# Patient Record
Sex: FEMALE | Race: WHITE | NOT HISPANIC OR LATINO | Employment: FULL TIME | URBAN - METROPOLITAN AREA
[De-identification: names, ages, dates, MRNs, and addresses within clinical notes are randomized per-mention and may not be internally consistent; named-entity substitution may affect disease eponyms.]

---

## 2018-08-14 ENCOUNTER — OFFICE VISIT CONVERTED (OUTPATIENT)
Dept: GASTROENTEROLOGY | Facility: CLINIC | Age: 23
End: 2018-08-14
Attending: NURSE PRACTITIONER

## 2019-01-18 ENCOUNTER — HOSPITAL ENCOUNTER (OUTPATIENT)
Dept: GENERAL RADIOLOGY | Facility: HOSPITAL | Age: 24
Discharge: HOME | End: 2019-01-18
Attending: NURSE PRACTITIONER

## 2019-03-31 ENCOUNTER — HOSPITAL ENCOUNTER (OUTPATIENT)
Dept: URGENT CARE | Facility: CLINIC | Age: 24
Discharge: HOME | End: 2019-03-31
Attending: NURSE PRACTITIONER

## 2019-04-01 ENCOUNTER — OFFICE VISIT CONVERTED (OUTPATIENT)
Dept: GASTROENTEROLOGY | Facility: CLINIC | Age: 24
End: 2019-04-01
Attending: NURSE PRACTITIONER

## 2019-04-21 ENCOUNTER — HOSPITAL ENCOUNTER (EMERGENCY)
Facility: HOSPITAL | Age: 24
Discharge: HOME | End: 2019-04-21
Attending: EMERGENCY MEDICINE | Admitting: FAMILY MEDICINE

## 2019-04-21 ENCOUNTER — APPOINTMENT (OUTPATIENT)
Dept: GENERAL RADIOLOGY | Facility: HOSPITAL | Age: 24
End: 2019-04-21

## 2019-04-21 VITALS
DIASTOLIC BLOOD PRESSURE: 81 MMHG | HEART RATE: 84 BPM | OXYGEN SATURATION: 98 % | RESPIRATION RATE: 20 BRPM | WEIGHT: 199 LBS | TEMPERATURE: 97.6 F | SYSTOLIC BLOOD PRESSURE: 159 MMHG | HEIGHT: 64 IN | BODY MASS INDEX: 33.97 KG/M2

## 2019-04-21 DIAGNOSIS — R07.9 CHEST PAIN, UNSPECIFIED TYPE: ICD-10-CM

## 2019-04-21 DIAGNOSIS — R31.9 URINARY TRACT INFECTION WITH HEMATURIA, SITE UNSPECIFIED: Primary | ICD-10-CM

## 2019-04-21 DIAGNOSIS — N39.0 URINARY TRACT INFECTION WITH HEMATURIA, SITE UNSPECIFIED: Primary | ICD-10-CM

## 2019-04-21 LAB
ALBUMIN SERPL-MCNC: 4.3 G/DL (ref 3.5–5.2)
ALBUMIN/GLOB SERPL: 1.5 G/DL
ALP SERPL-CCNC: 83 U/L (ref 39–117)
ALT SERPL W P-5'-P-CCNC: 22 U/L (ref 1–33)
AMPHET+METHAMPHET UR QL: NEGATIVE
ANION GAP SERPL CALCULATED.3IONS-SCNC: 14 MMOL/L
AST SERPL-CCNC: 16 U/L (ref 1–32)
B-HCG UR QL: NEGATIVE
BACTERIA UR QL AUTO: ABNORMAL /HPF
BARBITURATES UR QL SCN: NEGATIVE
BASOPHILS # BLD AUTO: 0.14 10*3/MM3 (ref 0–0.2)
BASOPHILS NFR BLD AUTO: 1.5 % (ref 0–1.5)
BENZODIAZ UR QL SCN: NEGATIVE
BILIRUB SERPL-MCNC: 0.3 MG/DL (ref 0.2–1.2)
BILIRUB UR QL STRIP: NEGATIVE
BUN BLD-MCNC: 7 MG/DL (ref 6–20)
BUN/CREAT SERPL: 9.3 (ref 7–25)
CALCIUM SPEC-SCNC: 9.7 MG/DL (ref 8.6–10.5)
CANNABINOIDS SERPL QL: NEGATIVE
CHLORIDE SERPL-SCNC: 105 MMOL/L (ref 98–107)
CLARITY UR: ABNORMAL
CO2 SERPL-SCNC: 26 MMOL/L (ref 22–29)
COCAINE UR QL: NEGATIVE
COLOR UR: YELLOW
CREAT BLD-MCNC: 0.75 MG/DL (ref 0.57–1)
DEPRECATED RDW RBC AUTO: 40.5 FL (ref 37–54)
EOSINOPHIL # BLD AUTO: 0.23 10*3/MM3 (ref 0–0.4)
EOSINOPHIL NFR BLD AUTO: 2.5 % (ref 0.3–6.2)
ERYTHROCYTE [DISTWIDTH] IN BLOOD BY AUTOMATED COUNT: 12.4 % (ref 12.3–15.4)
GFR SERPL CREATININE-BSD FRML MDRD: 97 ML/MIN/1.73
GLOBULIN UR ELPH-MCNC: 2.9 GM/DL
GLUCOSE BLD-MCNC: 104 MG/DL (ref 65–99)
GLUCOSE UR STRIP-MCNC: NEGATIVE MG/DL
HCT VFR BLD AUTO: 45.2 % (ref 34–46.6)
HGB BLD-MCNC: 15.2 G/DL (ref 12–15.9)
HGB UR QL STRIP.AUTO: ABNORMAL
HOLD SPECIMEN: NORMAL
HYALINE CASTS UR QL AUTO: ABNORMAL /LPF
IMM GRANULOCYTES # BLD AUTO: 0.02 10*3/MM3 (ref 0–0.05)
IMM GRANULOCYTES NFR BLD AUTO: 0.2 % (ref 0–0.5)
KETONES UR QL STRIP: NEGATIVE
LEUKOCYTE ESTERASE UR QL STRIP.AUTO: ABNORMAL
LYMPHOCYTES # BLD AUTO: 2.45 10*3/MM3 (ref 0.7–3.1)
LYMPHOCYTES NFR BLD AUTO: 26.1 % (ref 19.6–45.3)
MCH RBC QN AUTO: 29.7 PG (ref 26.6–33)
MCHC RBC AUTO-ENTMCNC: 33.6 G/DL (ref 31.5–35.7)
MCV RBC AUTO: 88.3 FL (ref 79–97)
METHADONE UR QL SCN: NEGATIVE
MONOCYTES # BLD AUTO: 0.5 10*3/MM3 (ref 0.1–0.9)
MONOCYTES NFR BLD AUTO: 5.3 % (ref 5–12)
NEUTROPHILS # BLD AUTO: 6.03 10*3/MM3 (ref 1.7–7)
NEUTROPHILS NFR BLD AUTO: 64.4 % (ref 42.7–76)
NITRITE UR QL STRIP: NEGATIVE
NRBC BLD AUTO-RTO: 0 /100 WBC (ref 0–0.2)
OPIATES UR QL: NEGATIVE
OXYCODONE UR QL SCN: NEGATIVE
PH UR STRIP.AUTO: 8 [PH] (ref 5–9)
PLATELET # BLD AUTO: 226 10*3/MM3 (ref 140–450)
PMV BLD AUTO: 10.4 FL (ref 6–12)
POTASSIUM BLD-SCNC: 3.6 MMOL/L (ref 3.5–5.2)
PROT SERPL-MCNC: 7.2 G/DL (ref 6–8.5)
PROT UR QL STRIP: NEGATIVE
RBC # BLD AUTO: 5.12 10*6/MM3 (ref 3.77–5.28)
RBC # UR: ABNORMAL /HPF
REF LAB TEST METHOD: ABNORMAL
SODIUM BLD-SCNC: 145 MMOL/L (ref 136–145)
SP GR UR STRIP: 1.01 (ref 1–1.03)
SQUAMOUS #/AREA URNS HPF: ABNORMAL /HPF
UROBILINOGEN UR QL STRIP: ABNORMAL
WBC NRBC COR # BLD: 9.37 10*3/MM3 (ref 3.4–10.8)
WBC UR QL AUTO: ABNORMAL /HPF
WHOLE BLOOD HOLD SPECIMEN: NORMAL
YEAST URNS QL MICRO: ABNORMAL /HPF

## 2019-04-21 PROCEDURE — 80307 DRUG TEST PRSMV CHEM ANLYZR: CPT | Performed by: PHYSICIAN ASSISTANT

## 2019-04-21 PROCEDURE — 87661 TRICHOMONAS VAGINALIS AMPLIF: CPT | Performed by: PHYSICIAN ASSISTANT

## 2019-04-21 PROCEDURE — 81025 URINE PREGNANCY TEST: CPT | Performed by: EMERGENCY MEDICINE

## 2019-04-21 PROCEDURE — 93010 ELECTROCARDIOGRAM REPORT: CPT | Performed by: INTERNAL MEDICINE

## 2019-04-21 PROCEDURE — 81001 URINALYSIS AUTO W/SCOPE: CPT | Performed by: EMERGENCY MEDICINE

## 2019-04-21 PROCEDURE — 87086 URINE CULTURE/COLONY COUNT: CPT | Performed by: PHYSICIAN ASSISTANT

## 2019-04-21 PROCEDURE — 99284 EMERGENCY DEPT VISIT MOD MDM: CPT

## 2019-04-21 PROCEDURE — 87591 N.GONORRHOEAE DNA AMP PROB: CPT | Performed by: PHYSICIAN ASSISTANT

## 2019-04-21 PROCEDURE — 80053 COMPREHEN METABOLIC PANEL: CPT | Performed by: PHYSICIAN ASSISTANT

## 2019-04-21 PROCEDURE — 93005 ELECTROCARDIOGRAM TRACING: CPT | Performed by: EMERGENCY MEDICINE

## 2019-04-21 PROCEDURE — 85025 COMPLETE CBC W/AUTO DIFF WBC: CPT | Performed by: PHYSICIAN ASSISTANT

## 2019-04-21 PROCEDURE — 87491 CHLMYD TRACH DNA AMP PROBE: CPT | Performed by: PHYSICIAN ASSISTANT

## 2019-04-21 RX ORDER — ESCITALOPRAM OXALATE 10 MG/1
10 TABLET ORAL DAILY
COMMUNITY
End: 2021-07-25

## 2019-04-21 RX ORDER — LEVOFLOXACIN 250 MG/1
250 TABLET ORAL DAILY
Qty: 5 TABLET | Refills: 0 | Status: SHIPPED | OUTPATIENT
Start: 2019-04-21 | End: 2019-04-26

## 2019-04-22 LAB
BACTERIA SPEC AEROBE CULT: NORMAL
C TRACH RRNA CVX QL NAA+PROBE: NEGATIVE
N GONORRHOEA RRNA SPEC QL NAA+PROBE: NEGATIVE
TRICHOMONAS VAGINALIS PCR: NEGATIVE

## 2019-05-09 ENCOUNTER — CONVERSION ENCOUNTER (OUTPATIENT)
Dept: CARDIOLOGY | Facility: CLINIC | Age: 24
End: 2019-05-09

## 2019-05-09 ENCOUNTER — OFFICE VISIT CONVERTED (OUTPATIENT)
Dept: CARDIOLOGY | Facility: CLINIC | Age: 24
End: 2019-05-09
Attending: INTERNAL MEDICINE

## 2019-05-13 ENCOUNTER — CONVERSION ENCOUNTER (OUTPATIENT)
Dept: SURGERY | Facility: CLINIC | Age: 24
End: 2019-05-13

## 2019-05-13 ENCOUNTER — OFFICE VISIT CONVERTED (OUTPATIENT)
Dept: SURGERY | Facility: CLINIC | Age: 24
End: 2019-05-13
Attending: PHYSICIAN ASSISTANT

## 2019-05-31 ENCOUNTER — HOSPITAL ENCOUNTER (OUTPATIENT)
Dept: GASTROENTEROLOGY | Facility: HOSPITAL | Age: 24
Setting detail: HOSPITAL OUTPATIENT SURGERY
Discharge: HOME | End: 2019-05-31
Attending: INTERNAL MEDICINE

## 2019-05-31 LAB — HCG UR QL: NEGATIVE

## 2019-06-12 ENCOUNTER — HOSPITAL ENCOUNTER (OUTPATIENT)
Dept: GENERAL RADIOLOGY | Facility: HOSPITAL | Age: 24
Discharge: HOME | End: 2019-06-12
Attending: PHYSICIAN ASSISTANT

## 2019-06-13 ENCOUNTER — CONVERSION ENCOUNTER (OUTPATIENT)
Dept: SURGERY | Facility: CLINIC | Age: 24
End: 2019-06-13

## 2019-06-13 ENCOUNTER — OFFICE VISIT CONVERTED (OUTPATIENT)
Dept: SURGERY | Facility: CLINIC | Age: 24
End: 2019-06-13
Attending: PHYSICIAN ASSISTANT

## 2019-06-30 ENCOUNTER — HOSPITAL ENCOUNTER (OUTPATIENT)
Dept: URGENT CARE | Facility: CLINIC | Age: 24
Discharge: HOME | End: 2019-06-30

## 2019-07-02 LAB — BACTERIA UR CULT: NORMAL

## 2019-07-25 ENCOUNTER — HOSPITAL ENCOUNTER (OUTPATIENT)
Dept: URGENT CARE | Facility: CLINIC | Age: 24
Discharge: HOME | End: 2019-07-25

## 2019-08-02 ENCOUNTER — CONVERSION ENCOUNTER (OUTPATIENT)
Dept: SURGERY | Facility: CLINIC | Age: 24
End: 2019-08-02

## 2019-08-02 ENCOUNTER — OFFICE VISIT CONVERTED (OUTPATIENT)
Dept: SURGERY | Facility: CLINIC | Age: 24
End: 2019-08-02
Attending: PHYSICIAN ASSISTANT

## 2019-08-13 ENCOUNTER — OFFICE VISIT CONVERTED (OUTPATIENT)
Dept: SURGERY | Facility: CLINIC | Age: 24
End: 2019-08-13
Attending: PHYSICIAN ASSISTANT

## 2019-09-16 ENCOUNTER — HOSPITAL ENCOUNTER (OUTPATIENT)
Dept: SURGERY | Facility: CLINIC | Age: 24
Discharge: HOME | End: 2019-09-16
Attending: PHYSICIAN ASSISTANT

## 2019-09-16 ENCOUNTER — OFFICE VISIT CONVERTED (OUTPATIENT)
Dept: SURGERY | Facility: CLINIC | Age: 24
End: 2019-09-16
Attending: PHYSICIAN ASSISTANT

## 2019-09-18 ENCOUNTER — HOSPITAL ENCOUNTER (OUTPATIENT)
Dept: URGENT CARE | Facility: CLINIC | Age: 24
Discharge: HOME | End: 2019-09-18

## 2019-09-18 LAB — BACTERIA UR CULT: NORMAL

## 2019-09-20 ENCOUNTER — HOSPITAL ENCOUNTER (OUTPATIENT)
Dept: URGENT CARE | Facility: CLINIC | Age: 24
Discharge: HOME | End: 2019-09-20

## 2019-09-20 LAB — BACTERIA SPEC AEROBE CULT: NORMAL

## 2019-09-24 ENCOUNTER — HOSPITAL ENCOUNTER (OUTPATIENT)
Dept: URGENT CARE | Facility: CLINIC | Age: 24
Discharge: HOME | End: 2019-09-24

## 2019-09-30 ENCOUNTER — HOSPITAL ENCOUNTER (OUTPATIENT)
Dept: LAB | Facility: HOSPITAL | Age: 24
Discharge: HOME | End: 2019-09-30
Attending: PHYSICIAN ASSISTANT

## 2019-09-30 ENCOUNTER — OFFICE VISIT CONVERTED (OUTPATIENT)
Dept: GASTROENTEROLOGY | Facility: CLINIC | Age: 24
End: 2019-09-30
Attending: NURSE PRACTITIONER

## 2019-09-30 LAB
ALBUMIN SERPL-MCNC: 4.3 G/DL (ref 3.5–5)
ALBUMIN/GLOB SERPL: 1.7 {RATIO} (ref 1.4–2.6)
ALP SERPL-CCNC: 72 U/L (ref 42–98)
ALT SERPL-CCNC: 15 U/L (ref 10–40)
ANION GAP SERPL CALC-SCNC: 20 MMOL/L (ref 8–19)
AST SERPL-CCNC: 14 U/L (ref 15–50)
BASOPHILS # BLD AUTO: 0.07 10*3/UL (ref 0–0.2)
BASOPHILS NFR BLD AUTO: 1 % (ref 0–3)
BILIRUB SERPL-MCNC: 0.25 MG/DL (ref 0.2–1.3)
BUN SERPL-MCNC: 8 MG/DL (ref 5–25)
BUN/CREAT SERPL: 10 {RATIO} (ref 6–20)
CALCIUM SERPL-MCNC: 9.5 MG/DL (ref 8.7–10.4)
CHLORIDE SERPL-SCNC: 105 MMOL/L (ref 99–111)
CONV ABS IMM GRAN: 0.02 10*3/UL (ref 0–0.2)
CONV CO2: 22 MMOL/L (ref 22–32)
CONV IMMATURE GRAN: 0.3 % (ref 0–1.8)
CONV TOTAL PROTEIN: 6.8 G/DL (ref 6.3–8.2)
CREAT UR-MCNC: 0.81 MG/DL (ref 0.5–0.9)
DEPRECATED RDW RBC AUTO: 38.2 FL (ref 36.4–46.3)
EOSINOPHIL # BLD AUTO: 0.08 10*3/UL (ref 0–0.7)
EOSINOPHIL # BLD AUTO: 1.2 % (ref 0–7)
ERYTHROCYTE [DISTWIDTH] IN BLOOD BY AUTOMATED COUNT: 11.8 % (ref 11.7–14.4)
GFR SERPLBLD BASED ON 1.73 SQ M-ARVRAT: >60 ML/MIN/{1.73_M2}
GLOBULIN UR ELPH-MCNC: 2.5 G/DL (ref 2–3.5)
GLUCOSE SERPL-MCNC: 96 MG/DL (ref 65–99)
HCT VFR BLD AUTO: 44 % (ref 37–47)
HGB BLD-MCNC: 14.6 G/DL (ref 12–16)
LYMPHOCYTES # BLD AUTO: 2.17 10*3/UL (ref 1–5)
LYMPHOCYTES NFR BLD AUTO: 31.8 % (ref 20–45)
MCH RBC QN AUTO: 29.8 PG (ref 27–31)
MCHC RBC AUTO-ENTMCNC: 33.2 G/DL (ref 33–37)
MCV RBC AUTO: 89.8 FL (ref 81–99)
MONOCYTES # BLD AUTO: 0.42 10*3/UL (ref 0.2–1.2)
MONOCYTES NFR BLD AUTO: 6.2 % (ref 3–10)
NEUTROPHILS # BLD AUTO: 4.06 10*3/UL (ref 2–8)
NEUTROPHILS NFR BLD AUTO: 59.5 % (ref 30–85)
NRBC CBCN: 0 % (ref 0–0.7)
OSMOLALITY SERPL CALC.SUM OF ELEC: 294 MOSM/KG (ref 273–304)
PLATELET # BLD AUTO: 240 10*3/UL (ref 130–400)
PMV BLD AUTO: 11.1 FL (ref 9.4–12.3)
POTASSIUM SERPL-SCNC: 4 MMOL/L (ref 3.5–5.3)
RBC # BLD AUTO: 4.9 10*6/UL (ref 4.2–5.4)
SODIUM SERPL-SCNC: 143 MMOL/L (ref 135–147)
WBC # BLD AUTO: 6.82 10*3/UL (ref 4.8–10.8)

## 2019-10-10 ENCOUNTER — OFFICE VISIT CONVERTED (OUTPATIENT)
Dept: SURGERY | Facility: CLINIC | Age: 24
End: 2019-10-10
Attending: PHYSICIAN ASSISTANT

## 2019-10-17 ENCOUNTER — OFFICE VISIT CONVERTED (OUTPATIENT)
Dept: SURGERY | Facility: CLINIC | Age: 24
End: 2019-10-17
Attending: PHYSICIAN ASSISTANT

## 2019-11-06 ENCOUNTER — HOSPITAL ENCOUNTER (OUTPATIENT)
Dept: URGENT CARE | Facility: CLINIC | Age: 24
Discharge: HOME | End: 2019-11-06

## 2019-11-10 ENCOUNTER — HOSPITAL ENCOUNTER (OUTPATIENT)
Dept: URGENT CARE | Facility: CLINIC | Age: 24
Discharge: HOME | End: 2019-11-10

## 2019-11-11 ENCOUNTER — HOSPITAL ENCOUNTER (OUTPATIENT)
Dept: SURGERY | Facility: CLINIC | Age: 24
Discharge: HOME | End: 2019-11-11
Attending: PHYSICIAN ASSISTANT

## 2019-11-11 ENCOUNTER — OFFICE VISIT CONVERTED (OUTPATIENT)
Dept: SURGERY | Facility: CLINIC | Age: 24
End: 2019-11-11
Attending: PHYSICIAN ASSISTANT

## 2019-11-12 LAB — BACTERIA SPEC AEROBE CULT: NORMAL

## 2019-11-13 LAB — BACTERIA UR CULT: NORMAL

## 2019-11-16 ENCOUNTER — HOSPITAL ENCOUNTER (OUTPATIENT)
Dept: URGENT CARE | Facility: CLINIC | Age: 24
Discharge: HOME | End: 2019-11-16
Attending: NURSE PRACTITIONER

## 2019-11-18 ENCOUNTER — CONVERSION ENCOUNTER (OUTPATIENT)
Dept: SURGERY | Facility: CLINIC | Age: 24
End: 2019-11-18

## 2019-11-18 ENCOUNTER — OFFICE VISIT CONVERTED (OUTPATIENT)
Dept: SURGERY | Facility: CLINIC | Age: 24
End: 2019-11-18
Attending: PHYSICIAN ASSISTANT

## 2019-11-24 ENCOUNTER — HOSPITAL ENCOUNTER (OUTPATIENT)
Dept: URGENT CARE | Facility: CLINIC | Age: 24
Discharge: HOME | End: 2019-11-24
Attending: FAMILY MEDICINE

## 2019-11-26 ENCOUNTER — HOSPITAL ENCOUNTER (OUTPATIENT)
Dept: GENERAL RADIOLOGY | Facility: HOSPITAL | Age: 24
Discharge: HOME | End: 2019-11-26
Attending: OBSTETRICS & GYNECOLOGY

## 2019-11-26 ENCOUNTER — OFFICE VISIT CONVERTED (OUTPATIENT)
Dept: SURGERY | Facility: CLINIC | Age: 24
End: 2019-11-26
Attending: PHYSICIAN ASSISTANT

## 2019-12-10 ENCOUNTER — OFFICE VISIT CONVERTED (OUTPATIENT)
Dept: GASTROENTEROLOGY | Facility: CLINIC | Age: 24
End: 2019-12-10
Attending: NURSE PRACTITIONER

## 2019-12-13 ENCOUNTER — HOSPITAL ENCOUNTER (OUTPATIENT)
Dept: LAB | Facility: HOSPITAL | Age: 24
Discharge: HOME | End: 2019-12-13
Attending: NURSE PRACTITIONER

## 2019-12-13 LAB
C DIFF TOX B STL QL CT TISS CULT: NEGATIVE
CONV 027 TOXIN: NEGATIVE

## 2019-12-15 LAB — BACTERIA SPEC AEROBE CULT: NORMAL

## 2019-12-23 LAB
CONV NEUTRAL FATS: NORMAL
FAT STL QL: NORMAL

## 2020-01-11 ENCOUNTER — HOSPITAL ENCOUNTER (OUTPATIENT)
Dept: URGENT CARE | Facility: CLINIC | Age: 25
Discharge: HOME | End: 2020-01-11

## 2020-01-13 LAB — BACTERIA SPEC AEROBE CULT: NORMAL

## 2020-01-14 ENCOUNTER — HOSPITAL ENCOUNTER (OUTPATIENT)
Dept: URGENT CARE | Facility: CLINIC | Age: 25
Discharge: HOME | End: 2020-01-14
Attending: EMERGENCY MEDICINE

## 2020-01-28 ENCOUNTER — HOSPITAL ENCOUNTER (OUTPATIENT)
Dept: LAB | Facility: HOSPITAL | Age: 25
Discharge: HOME | End: 2020-01-28
Attending: NURSE PRACTITIONER

## 2020-01-28 LAB
BASOPHILS # BLD AUTO: 0.06 10*3/UL (ref 0–0.2)
BASOPHILS NFR BLD AUTO: 0.8 % (ref 0–3)
CONV ABS IMM GRAN: 0.02 10*3/UL (ref 0–0.2)
CONV IMMATURE GRAN: 0.3 % (ref 0–1.8)
DEPRECATED RDW RBC AUTO: 41.2 FL (ref 36.4–46.3)
EOSINOPHIL # BLD AUTO: 0.05 10*3/UL (ref 0–0.7)
EOSINOPHIL # BLD AUTO: 0.7 % (ref 0–7)
ERYTHROCYTE [DISTWIDTH] IN BLOOD BY AUTOMATED COUNT: 12.1 % (ref 11.7–14.4)
HCT VFR BLD AUTO: 46.7 % (ref 37–47)
HGB BLD-MCNC: 14.7 G/DL (ref 12–16)
LYMPHOCYTES # BLD AUTO: 2.01 10*3/UL (ref 1–5)
LYMPHOCYTES NFR BLD AUTO: 26.7 % (ref 20–45)
MCH RBC QN AUTO: 29.1 PG (ref 27–31)
MCHC RBC AUTO-ENTMCNC: 31.5 G/DL (ref 33–37)
MCV RBC AUTO: 92.5 FL (ref 81–99)
MONOCYTES # BLD AUTO: 0.46 10*3/UL (ref 0.2–1.2)
MONOCYTES NFR BLD AUTO: 6.1 % (ref 3–10)
NEUTROPHILS # BLD AUTO: 4.93 10*3/UL (ref 2–8)
NEUTROPHILS NFR BLD AUTO: 65.4 % (ref 30–85)
NRBC CBCN: 0 % (ref 0–0.7)
PLATELET # BLD AUTO: 239 10*3/UL (ref 130–400)
PMV BLD AUTO: 11.3 FL (ref 9.4–12.3)
RBC # BLD AUTO: 5.05 10*6/UL (ref 4.2–5.4)
WBC # BLD AUTO: 7.53 10*3/UL (ref 4.8–10.8)

## 2020-02-07 ENCOUNTER — HOSPITAL ENCOUNTER (OUTPATIENT)
Dept: URGENT CARE | Facility: CLINIC | Age: 25
Discharge: HOME | End: 2020-02-07
Attending: PHYSICIAN ASSISTANT

## 2020-02-10 LAB — BACTERIA SPEC AEROBE CULT: NORMAL

## 2020-02-18 ENCOUNTER — OFFICE VISIT CONVERTED (OUTPATIENT)
Dept: SURGERY | Facility: CLINIC | Age: 25
End: 2020-02-18
Attending: PHYSICIAN ASSISTANT

## 2020-02-18 ENCOUNTER — HOSPITAL ENCOUNTER (OUTPATIENT)
Dept: SURGERY | Facility: CLINIC | Age: 25
Discharge: HOME | End: 2020-02-18
Attending: PHYSICIAN ASSISTANT

## 2020-02-20 LAB — BACTERIA UR CULT: NORMAL

## 2020-02-21 ENCOUNTER — HOSPITAL ENCOUNTER (OUTPATIENT)
Dept: URGENT CARE | Facility: CLINIC | Age: 25
Discharge: HOME | End: 2020-02-21
Attending: NURSE PRACTITIONER

## 2020-03-05 ENCOUNTER — HOSPITAL ENCOUNTER (OUTPATIENT)
Dept: URGENT CARE | Facility: CLINIC | Age: 25
Discharge: HOME | End: 2020-03-05
Attending: NURSE PRACTITIONER

## 2020-03-06 ENCOUNTER — OFFICE VISIT CONVERTED (OUTPATIENT)
Dept: OTOLARYNGOLOGY | Facility: CLINIC | Age: 25
End: 2020-03-06
Attending: OTOLARYNGOLOGY

## 2020-03-07 LAB — BACTERIA SPEC AEROBE CULT: NORMAL

## 2020-04-24 ENCOUNTER — HOSPITAL ENCOUNTER (OUTPATIENT)
Dept: GENERAL RADIOLOGY | Facility: HOSPITAL | Age: 25
Discharge: HOME | End: 2020-04-24
Attending: OBSTETRICS & GYNECOLOGY

## 2020-05-05 LAB — SARS-COV-2 RNA SPEC QL NAA+PROBE: NOT DETECTED

## 2020-05-07 ENCOUNTER — HOSPITAL ENCOUNTER (OUTPATIENT)
Dept: GASTROENTEROLOGY | Facility: HOSPITAL | Age: 25
Setting detail: HOSPITAL OUTPATIENT SURGERY
Discharge: HOME | End: 2020-05-07
Attending: INTERNAL MEDICINE

## 2020-05-07 LAB — HCG UR QL: NEGATIVE

## 2020-06-16 ENCOUNTER — OFFICE VISIT CONVERTED (OUTPATIENT)
Dept: GASTROENTEROLOGY | Facility: CLINIC | Age: 25
End: 2020-06-16
Attending: NURSE PRACTITIONER

## 2020-07-30 ENCOUNTER — HOSPITAL ENCOUNTER (OUTPATIENT)
Dept: SLEEP MEDICINE | Facility: HOSPITAL | Age: 25
Discharge: HOME | End: 2020-07-30
Attending: INTERNAL MEDICINE

## 2020-08-11 ENCOUNTER — HOSPITAL ENCOUNTER (OUTPATIENT)
Dept: SLEEP MEDICINE | Facility: HOSPITAL | Age: 25
Discharge: HOME | End: 2020-08-11
Attending: INTERNAL MEDICINE

## 2020-09-16 ENCOUNTER — TELEPHONE CONVERTED (OUTPATIENT)
Dept: GASTROENTEROLOGY | Facility: CLINIC | Age: 25
End: 2020-09-16
Attending: NURSE PRACTITIONER

## 2020-09-24 ENCOUNTER — HOSPITAL ENCOUNTER (OUTPATIENT)
Dept: LAB | Facility: HOSPITAL | Age: 25
Discharge: HOME | End: 2020-09-24
Attending: NURSE PRACTITIONER

## 2020-09-24 LAB
C DIFF TOX B STL QL CT TISS CULT: NEGATIVE
CONV 027 TOXIN: NEGATIVE

## 2020-09-26 LAB — BACTERIA SPEC AEROBE CULT: NORMAL

## 2020-09-30 LAB
CONV NEUTRAL FATS: NORMAL
FAT STL QL: NORMAL

## 2020-11-05 ENCOUNTER — HOSPITAL ENCOUNTER (OUTPATIENT)
Dept: SLEEP MEDICINE | Facility: HOSPITAL | Age: 25
Discharge: HOME | End: 2020-11-05
Attending: INTERNAL MEDICINE

## 2020-11-09 ENCOUNTER — TELEPHONE CONVERTED (OUTPATIENT)
Dept: GASTROENTEROLOGY | Facility: CLINIC | Age: 25
End: 2020-11-09
Attending: NURSE PRACTITIONER

## 2021-05-13 NOTE — PROGRESS NOTES
Progress Note      Patient Name: Kinsey Suresh   Patient ID: [id number]   Sex: Female   YOB: 1995    Primary Care Provider: Paulina Gale NP   Referring Provider: Deyanira BURTON    Visit Date: June 16, 2020    Provider: MICHEAL Shanks   Location: [facility] [place], [place]   Location Address: [address] [place] [address]  [id number]   Location Phone: [phone]          Chief Complaint  · Blood in stool  · constipated       History Of Present Illness  Kinsey Suresh is a 25 year old /White female who presents to the office today.      Patient initially presented July 2017 for fatty liver, elevated alk phos, diarrhea, lower abdominal pain.  Stool studies were negative in 2017, hepatic work-up negative in 2017, FibroSure showed F0, A0.    EGD/COLON negative 5/2019. CT abd/pelvis w contrast negative 6/2019.     Patient was last seen December 2019 reporting the week of Thanksgiving she had bloody stools with straining but then started having diarrhea a couple of weeks before the last appointment, some generalized abdominal pain.  CBC CMP unremarkable December 2019.    Stool studies negative 12/2019  Colonoscopy 5/7/2020: Adequate prep, small grade 1 internal hemorrhoids noted    Pt states she has seen a lot of blood w BM's , + straining. Pt tried fiber and stool softener, caused nausea, so she DC'd. BM's every 1-2 days, Glades #2-3.  No abd pain, except occasional lower abd, can be relieved after BM.       Past Medical History  Anxiety; Asthma; Depression; Diarrhea; Dizziness and giddiness; Fatty liver; Gastroesophageal reflux disease; Irritable bowel syndrome; Myofacial muscle pain; Recurrent UTI; Tinnitus, bilateral; TMJ (temporomandibular joint disorder)         Past Surgical History  Colonoscopy; endoscopy         Medication List  bisoprolol fumarate 5 mg oral tablet         Allergy List  Bactrim; Cipro; doxycycline hyclate; doxycycline monohydrate; Levaquin  "        Family Medical History  Family history of colon cancer; Family history of heart disease         Social History  Alcohol (Never); lives with parents; Single; Student; Tobacco (Never)         Review of Systems  · Constitutional  o Admits  o : good general health lately, no acute distress  · Gastrointestinal  o Denies  o : additional gastrointestinal symptoms except as noted in the HPI  · Psychiatric  o Admits  o : pleasant affect      Vitals  Date Time BP Position Site L\R Cuff Size HR RR TEMP (F) WT  HT  BMI kg/m2 BSA m2 O2 Sat        06/16/2020 02:43 /58 Sitting    67 - R  97.9 242lbs 2oz 5'  4\" 41.56 2.23           Physical Examination  · Constitutional  o Appearance  o : well developed, well-nourished, in no acute distress  · Head and Face  o Head  o :   § Inspection  § : atraumatic, normocephalic  · Eyes  o Sclerae  o : sclerae white, no sclerae icterus  · Neck  o Inspection/Palpation  o : supple  · Respiratory  o Respiratory Effort  o : breathing unlabored  o Inspection of Chest  o : normal appearance, no retractions  · Cardiovascular  o Peripheral Vascular System  o :   § Extremities  § : no cyanosis, clubbing or edema  · Skin and Subcutaneous Tissue  o General Inspection  o : no lesions present, no rashes present  · Neurologic  o Mental Status Examination  o :   § Orientation  § : grossly oriented to person, place and time  § Speech/Language  § : communication ability within normal limits, voice quality normal, articulation of speech normal, no evidence of aphasia  § Attention  § : attention normal, concentration abilities normal  o Sensation  o : grossly intact  o Gait and Station  o :   § Gait Screening  § : normal gait  · Psychiatric  o General  o : Alert and oriented x3  o Mood and Affect  o : Mood and affect are appropriate to circumstances          Assessment  · Constipation     564.00/K59.00  · Internal hemorrhoid     455.0/K64.8  · Rectal " bleeding     569.3/K62.5      Plan  · Medications  o Colace 100 mg oral capsule   SIG: take 1 capsule (100 mg) by oral route 2 times per day for 30 days   DISP: (60) capsules with 3 refills  Prescribed on 06/16/2020     o Proctozone-HC 2.5 % topical cream with perineal applicator   SIG: apply a thin layer to the affected area(s) by topical route 2 times per day for 14 days   DISP: (1) Cream with perineal applicator with 0 refills  Prescribed on 06/16/2020     o Medications have been Reconciled  o Transition of Care or Provider Policy  · Instructions  o Patient was educated/instructed on their diagnosis, treatment and medications prior to discharge from the clinic today.  o Patient instructed to seek medical attention urgently for new or worsening symptoms.  o Follow Up in 3 months.   o Encouraged to follow-up with Primary Care Provider for preventative care.  o Pt felt the nausea was from the Fiber and was willing to try the Colace daily-BID. She will call if she cannot tolerate.             Electronically Signed by: MICHEAL Shanks -Author on June 16, 2020 04:38:18 PM

## 2021-05-13 NOTE — PROGRESS NOTES
Progress Note      Patient Name: Kinsey Suresh   Patient ID: [id number]   Sex: Female   YOB: 1995    Primary Care Provider: Paulina Gale NP   Referring Provider: Deyanira BURTON    Visit Date: September 16, 2020    Provider: MICHEAL Shanks   Location: [facility] [address]   Location Address: [address] [place] [address]  [id number]   Location Phone: [phone]          Chief Complaint  · Diarrhea   · ABD pain       History Of Present Illness  TELEHEALTH TELEPHONE VISIT  Kinsey Suresh is a 25 year old /White female who is presenting for evaluation via telehealth telephone visit. Verbal consent obtained before beginning visit.   Provider spent 5 minutes with the patient during the telehealth visit.   The following staff were present during this visit: Lorena CHANDLER , Kris Ruelas MA   Past Medical History/ Overview of Patient Symptoms     Patient initially presented July 2017 for fatty liver, elevated alk phos (later resolved), diarrhea, lower abdominal pain.  Hepatic work-up was negative in 2017, fibro-sure showed F0, A0.    Negative EGD/colonoscopy May 2019, negative CT of the abdomen and pelvis  Patient reported an episode of bloody stools in November 2019 with diarrhea and abdominal pain, labs were unremarkable, stool studies were negative in December 2019, colonoscopy was repeated in May 2020 and was negative other than grade 1 internal hemorrhoid.    Patient was last seen June 2020 and reported a lot of blood with stool and + straining, felt fiber caused nausea so she discontinued.  Colace and Proctosol were prescribed.  Pt states she has not seen blood recently, she has been having more loose stools than constipation. She did take the Procotzone. Some abd cramping. 4-5 stools/day, states she has awoken during night a couple times for BM.       Past Medical History  Anxiety; Asthma; Depression; Diarrhea; Dizziness and giddiness; Fatty liver;  Gastroesophageal reflux disease; Irritable bowel syndrome; Myofacial muscle pain; Recurrent UTI; Tinnitus, bilateral; TMJ (temporomandibular joint disorder)         Past Surgical History  Colonoscopy; EGD; endoscopy         Medication List  bisoprolol fumarate 5 mg oral tablet; Blisovi 24 Fe 1 mg-20 mcg (24)/75 mg (4) oral tablet         Allergy List  Bactrim; Cipro; doxycycline hyclate; doxycycline monohydrate; Levaquin         Family Medical History  Family history of colon cancer; Family history of heart disease         Social History  Alcohol (Never); lives with parents; Single; Student; Tobacco (Never)             Assessment  · Diarrhea     787.91/R19.7  · Fatty liver     573.8  Hx of  · Irritable bowel syndrome     564.1/K58.9  mixed  · Internal hemorrhoids     455.0/K64.8      Plan  · Orders  o Stool culture (59242, 16303) - - 09/16/2020  o Lactoferrin (Fecal) Qualitative (82265) - - 09/16/2020  o C difficile Toxigenic Assay (PCR) [facility] (33576) - - 09/16/2020  o Stool Giardia and Cryptosporidium Enzyme Immunoassay [facility] (99378, 92635) - - 09/16/2020  o Fecal Fat, Qualitative (50206) - - 09/16/2020  o Fecal Occult Blood Diagnostic (Send to Lab) [facility] (40995) - - 09/16/2020  · Medications  o dicyclomine 10 mg oral capsule   SIG: take 1 capsule by oral route 3 times a day for 30 days take 30 min before meals PRN abd pain   DISP: (90) capsules with 1 refills  Prescribed on 09/16/2020     o Medications have been Reconciled  o Transition of Care or Provider Policy  · Instructions  o Plan Of Care:   o Patient instructed to seek medical attention urgently for new or worsening symptoms.  o Call the office with any concerns or questions.  o Although pt has fluctuation of constipation and diarrhea, stools ordered since pt reported some nocturnal stools. Will also trial her on Bentyl.  o F/U next available            Electronically Signed by: MICHEAL Shanks -Author on September 16, 2020 09:37:20 AM

## 2021-05-13 NOTE — PROGRESS NOTES
Progress Note      Patient Name: Kinsey Suresh   Patient ID: [id number]   Sex: Female   YOB: 1995    Primary Care Provider: Paulina Gale NP   Referring Provider: Deyanira BURTON    Visit Date: November 9, 2020    Provider: MICHEAL Shanks   Location: [facility] [address]   Location Address: [address] [place] [address]  [id number]   Location Phone: [phone]          Chief Complaint  · follow up       History Of Present Illness  TELEHEALTH TELEPHONE VISIT  Kinsey Suresh is a 25 year old /White female who is presenting for evaluation via telehealth telephone visit. Verbal consent obtained before beginning visit.   Provider spent 7 minutes with the patient during the telehealth visit.   The following staff were present during this visit: Kris Ruelas MA; Lorena CHANDLER   Past Medical History/ Overview of Patient Symptoms     Patient initially presented July 2017 for fatty liver, elevated alk phos (later resolved), diarrhea, lower abdominal pain.  Hepatic work-up was negative in 2017, fibro-sure showed F0, A0.    Negative EGD/colonoscopy May 2019, negative CT of the abdomen and pelvis    Patient was last seen September 2020 reporting no recent rectal bleeding (had seen in June) but was complaining of more loose stools.  She felt she improved after Proctozone, she had some complaints of abdominal cramping also so dicyclomine was prescribed and stool studies were ordered  9/24/2020: Stool occult blood negative, lactoferrin negative, C. difficile negative, Giardia and crypto negative, culture negative, fat negative  10/8/2020: CBC unremarkable, UA negative, CMP unremarkable    Pt states she has seen blood w wiping at times. States she is constipated at times. Has had some rectal pain if constipated. Dicyclomine has helped abd cramping, she was taking daily, reduced last week, no cramping now. Pt is trying to lose weight, has reduced portion size, no abd  pain.       Past Medical History  Anxiety; Asthma; Depression; Diarrhea; Dizziness and giddiness; Fatty liver; Gastroesophageal reflux disease; Irritable bowel syndrome; Myofacial muscle pain; Recurrent UTI; Tinnitus, bilateral; TMJ (temporomandibular joint disorder)         Past Surgical History  Colonoscopy; EGD; endoscopy         Medication List  Name Date Started Instructions   bisoprolol fumarate 5 mg oral tablet  --    hydroxyzine HCl 10 mg oral tablet  take 1 tablet by oral route As needed for 30 days         Allergy List  Bactrim; Cipro; doxycycline hyclate; doxycycline monohydrate; Levaquin       Allergies Reconciled  Family Medical History  Family history of colon cancer; Family history of heart disease         Social History  Alcohol (Never); lives with parents; Single; Student; Tobacco (Never)             Assessment  · Irritable bowel syndrome     564.1/K58.9  mixed  · Constipation     564.00/K59.00  currently  · Internal hemorrhoids     455.0/K64.8      Plan  · Medications  o Colace 100 mg oral capsule   SIG: take 1 capsule (100 mg) by oral route 2 times per day for 30 days   DISP: (60) Capsule with 3 refills  Prescribed on 11/09/2020     o Proctozone-HC 2.5 % topical cream with perineal applicator   SIG: apply a thin layer to the affected area(s) by topical route 2 times per day for 14 days   DISP: (1) Tube with 0 refills  Prescribed on 11/09/2020     o dicyclomine 10 mg oral capsule   SIG: take 1 capsule by oral route 3 times a day for 30 days take 30 min before meals PRN abd pain   DISP: (90) Capsule with 1 refills  Prescribed on 11/09/2020     o Medications have been Reconciled  o Transition of Care or Provider Policy  · Instructions  o Plan Of Care:   o Patient instructed to seek medical attention urgently for new or worsening symptoms.  o Call the office with any concerns or questions.  o F/U PRN, call if any continued issues.   o Recommended Dicyclomine only PRN, if loose stools or cramping;  currently take Colace for c/o constipation.             Electronically Signed by: MICHEAL Shanks -Author on November 9, 2020 04:35:06 PM

## 2021-05-15 VITALS
HEART RATE: 101 BPM | WEIGHT: 193 LBS | SYSTOLIC BLOOD PRESSURE: 111 MMHG | DIASTOLIC BLOOD PRESSURE: 74 MMHG | TEMPERATURE: 98.2 F | HEIGHT: 64 IN | BODY MASS INDEX: 32.95 KG/M2

## 2021-05-15 VITALS
WEIGHT: 193 LBS | BODY MASS INDEX: 32.95 KG/M2 | SYSTOLIC BLOOD PRESSURE: 108 MMHG | HEIGHT: 64 IN | HEART RATE: 70 BPM | DIASTOLIC BLOOD PRESSURE: 72 MMHG

## 2021-05-15 VITALS — RESPIRATION RATE: 12 BRPM | WEIGHT: 206 LBS | HEIGHT: 64 IN | BODY MASS INDEX: 35.17 KG/M2

## 2021-05-15 VITALS — BODY MASS INDEX: 37.41 KG/M2 | HEIGHT: 64 IN | RESPIRATION RATE: 16 BRPM | WEIGHT: 219.12 LBS

## 2021-05-15 VITALS
DIASTOLIC BLOOD PRESSURE: 58 MMHG | WEIGHT: 242.12 LBS | SYSTOLIC BLOOD PRESSURE: 120 MMHG | HEART RATE: 67 BPM | HEIGHT: 64 IN | BODY MASS INDEX: 41.33 KG/M2 | TEMPERATURE: 97.9 F

## 2021-05-15 VITALS — WEIGHT: 208.5 LBS | BODY MASS INDEX: 35.59 KG/M2 | RESPIRATION RATE: 12 BRPM | HEIGHT: 64 IN

## 2021-05-15 VITALS — WEIGHT: 214 LBS | HEIGHT: 64 IN | RESPIRATION RATE: 12 BRPM | BODY MASS INDEX: 36.54 KG/M2

## 2021-05-15 VITALS — BODY MASS INDEX: 37.07 KG/M2 | TEMPERATURE: 97.2 F | WEIGHT: 217.12 LBS | RESPIRATION RATE: 14 BRPM | HEIGHT: 64 IN

## 2021-05-15 VITALS — WEIGHT: 196.12 LBS | HEIGHT: 63 IN | BODY MASS INDEX: 34.75 KG/M2 | RESPIRATION RATE: 12 BRPM

## 2021-05-15 VITALS — HEIGHT: 64 IN | BODY MASS INDEX: 36.9 KG/M2 | WEIGHT: 216.12 LBS | RESPIRATION RATE: 16 BRPM

## 2021-05-15 VITALS — WEIGHT: 215.37 LBS | BODY MASS INDEX: 36.77 KG/M2 | RESPIRATION RATE: 15 BRPM | HEIGHT: 64 IN

## 2021-05-15 VITALS — HEIGHT: 64 IN | RESPIRATION RATE: 12 BRPM | BODY MASS INDEX: 37.47 KG/M2 | WEIGHT: 219.5 LBS

## 2021-05-15 VITALS
SYSTOLIC BLOOD PRESSURE: 112 MMHG | WEIGHT: 213.37 LBS | HEIGHT: 64 IN | HEART RATE: 85 BPM | BODY MASS INDEX: 36.43 KG/M2 | DIASTOLIC BLOOD PRESSURE: 71 MMHG

## 2021-05-15 VITALS
HEART RATE: 72 BPM | SYSTOLIC BLOOD PRESSURE: 103 MMHG | DIASTOLIC BLOOD PRESSURE: 70 MMHG | WEIGHT: 219.12 LBS | BODY MASS INDEX: 37.41 KG/M2 | HEIGHT: 64 IN

## 2021-05-15 VITALS — WEIGHT: 209.12 LBS | BODY MASS INDEX: 35.7 KG/M2 | HEIGHT: 64 IN | RESPIRATION RATE: 12 BRPM

## 2021-05-15 VITALS — BODY MASS INDEX: 35.14 KG/M2 | RESPIRATION RATE: 12 BRPM | HEIGHT: 63 IN | WEIGHT: 198.31 LBS

## 2021-05-15 VITALS — RESPIRATION RATE: 12 BRPM | BODY MASS INDEX: 36.88 KG/M2 | WEIGHT: 216 LBS | HEIGHT: 64 IN

## 2021-05-16 VITALS
BODY MASS INDEX: 35.32 KG/M2 | SYSTOLIC BLOOD PRESSURE: 98 MMHG | HEART RATE: 69 BPM | DIASTOLIC BLOOD PRESSURE: 70 MMHG | HEIGHT: 65 IN | WEIGHT: 212 LBS | TEMPERATURE: 98.7 F

## 2021-06-23 ENCOUNTER — HOSPITAL ENCOUNTER (EMERGENCY)
Facility: HOSPITAL | Age: 26
Discharge: HOME | End: 2021-06-23
Attending: EMERGENCY MEDICINE | Admitting: EMERGENCY MEDICINE

## 2021-06-23 VITALS
HEIGHT: 64 IN | SYSTOLIC BLOOD PRESSURE: 114 MMHG | HEART RATE: 82 BPM | WEIGHT: 236.99 LBS | TEMPERATURE: 98.1 F | DIASTOLIC BLOOD PRESSURE: 69 MMHG | RESPIRATION RATE: 20 BRPM | OXYGEN SATURATION: 95 % | BODY MASS INDEX: 40.46 KG/M2

## 2021-06-23 DIAGNOSIS — D72.829 LEUKOCYTOSIS, UNSPECIFIED TYPE: ICD-10-CM

## 2021-06-23 DIAGNOSIS — K52.9 GASTROENTERITIS: ICD-10-CM

## 2021-06-23 DIAGNOSIS — N39.0 ACUTE UTI: ICD-10-CM

## 2021-06-23 DIAGNOSIS — A05.9 FOOD POISONING: Primary | ICD-10-CM

## 2021-06-23 LAB
ALBUMIN SERPL-MCNC: 4.5 G/DL (ref 3.5–5.2)
ALBUMIN/GLOB SERPL: 1.5 G/DL
ALP SERPL-CCNC: 110 U/L (ref 39–117)
ALT SERPL W P-5'-P-CCNC: 21 U/L (ref 1–33)
ANION GAP SERPL CALCULATED.3IONS-SCNC: 12.9 MMOL/L (ref 5–15)
AST SERPL-CCNC: 18 U/L (ref 1–32)
BACTERIA UR QL AUTO: ABNORMAL /HPF
BASOPHILS # BLD AUTO: 0.08 10*3/MM3 (ref 0–0.2)
BASOPHILS NFR BLD AUTO: 0.4 % (ref 0–1.5)
BILIRUB SERPL-MCNC: 0.3 MG/DL (ref 0–1.2)
BILIRUB UR QL STRIP: NEGATIVE
BUN SERPL-MCNC: 12 MG/DL (ref 6–20)
BUN/CREAT SERPL: 14.5 (ref 7–25)
CALCIUM SPEC-SCNC: 9.5 MG/DL (ref 8.6–10.5)
CHLORIDE SERPL-SCNC: 101 MMOL/L (ref 98–107)
CLARITY UR: ABNORMAL
CO2 SERPL-SCNC: 25.1 MMOL/L (ref 22–29)
COD CRY URNS QL: ABNORMAL /HPF
COLOR UR: YELLOW
CREAT SERPL-MCNC: 0.83 MG/DL (ref 0.57–1)
DEPRECATED RDW RBC AUTO: 38.1 FL (ref 37–54)
EOSINOPHIL # BLD AUTO: 0.05 10*3/MM3 (ref 0–0.4)
EOSINOPHIL NFR BLD AUTO: 0.2 % (ref 0.3–6.2)
ERYTHROCYTE [DISTWIDTH] IN BLOOD BY AUTOMATED COUNT: 11.9 % (ref 12.3–15.4)
GFR SERPL CREATININE-BSD FRML MDRD: 84 ML/MIN/1.73
GLOBULIN UR ELPH-MCNC: 3 GM/DL
GLUCOSE SERPL-MCNC: 140 MG/DL (ref 65–99)
GLUCOSE UR STRIP-MCNC: NEGATIVE MG/DL
HCG INTACT+B SERPL-ACNC: <0.5 MIU/ML
HCT VFR BLD AUTO: 46 % (ref 34–46.6)
HGB BLD-MCNC: 15.5 G/DL (ref 12–15.9)
HGB UR QL STRIP.AUTO: NEGATIVE
HOLD SPECIMEN: NORMAL
HOLD SPECIMEN: NORMAL
HYALINE CASTS UR QL AUTO: ABNORMAL /LPF
IMM GRANULOCYTES # BLD AUTO: 0.09 10*3/MM3 (ref 0–0.05)
IMM GRANULOCYTES NFR BLD AUTO: 0.4 % (ref 0–0.5)
KETONES UR QL STRIP: NEGATIVE
LEUKOCYTE ESTERASE UR QL STRIP.AUTO: ABNORMAL
LIPASE SERPL-CCNC: 27 U/L (ref 13–60)
LYMPHOCYTES # BLD AUTO: 2.56 10*3/MM3 (ref 0.7–3.1)
LYMPHOCYTES NFR BLD AUTO: 12.3 % (ref 19.6–45.3)
MCH RBC QN AUTO: 29.6 PG (ref 26.6–33)
MCHC RBC AUTO-ENTMCNC: 33.7 G/DL (ref 31.5–35.7)
MCV RBC AUTO: 87.8 FL (ref 79–97)
MONOCYTES # BLD AUTO: 1.15 10*3/MM3 (ref 0.1–0.9)
MONOCYTES NFR BLD AUTO: 5.5 % (ref 5–12)
NEUTROPHILS NFR BLD AUTO: 16.89 10*3/MM3 (ref 1.7–7)
NEUTROPHILS NFR BLD AUTO: 81.2 % (ref 42.7–76)
NITRITE UR QL STRIP: NEGATIVE
NRBC BLD AUTO-RTO: 0 /100 WBC (ref 0–0.2)
PH UR STRIP.AUTO: 5.5 [PH] (ref 5–8)
PLATELET # BLD AUTO: 247 10*3/MM3 (ref 140–450)
PMV BLD AUTO: 10.4 FL (ref 6–12)
POTASSIUM SERPL-SCNC: 4.1 MMOL/L (ref 3.5–5.2)
PROT SERPL-MCNC: 7.5 G/DL (ref 6–8.5)
PROT UR QL STRIP: ABNORMAL
RBC # BLD AUTO: 5.24 10*6/MM3 (ref 3.77–5.28)
RBC # UR: ABNORMAL /HPF
REF LAB TEST METHOD: ABNORMAL
SODIUM SERPL-SCNC: 139 MMOL/L (ref 136–145)
SP GR UR STRIP: 1.03 (ref 1–1.03)
SQUAMOUS #/AREA URNS HPF: ABNORMAL /HPF
UROBILINOGEN UR QL STRIP: ABNORMAL
WBC # BLD AUTO: 20.82 10*3/MM3 (ref 3.4–10.8)
WBC UR QL AUTO: ABNORMAL /HPF
WHOLE BLOOD HOLD SPECIMEN: NORMAL

## 2021-06-23 PROCEDURE — 85025 COMPLETE CBC W/AUTO DIFF WBC: CPT | Performed by: EMERGENCY MEDICINE

## 2021-06-23 PROCEDURE — 25010000002 DICYCLOMINE PER 20 MG: Performed by: NURSE PRACTITIONER

## 2021-06-23 PROCEDURE — 63710000001 ONDANSETRON ODT 4 MG TABLET DISPERSIBLE: Performed by: NURSE PRACTITIONER

## 2021-06-23 PROCEDURE — 80053 COMPREHEN METABOLIC PANEL: CPT | Performed by: EMERGENCY MEDICINE

## 2021-06-23 PROCEDURE — 99283 EMERGENCY DEPT VISIT LOW MDM: CPT

## 2021-06-23 PROCEDURE — 36415 COLL VENOUS BLD VENIPUNCTURE: CPT | Performed by: EMERGENCY MEDICINE

## 2021-06-23 PROCEDURE — 81001 URINALYSIS AUTO W/SCOPE: CPT | Performed by: EMERGENCY MEDICINE

## 2021-06-23 PROCEDURE — 83690 ASSAY OF LIPASE: CPT | Performed by: EMERGENCY MEDICINE

## 2021-06-23 PROCEDURE — 96372 THER/PROPH/DIAG INJ SC/IM: CPT

## 2021-06-23 PROCEDURE — 84702 CHORIONIC GONADOTROPIN TEST: CPT | Performed by: EMERGENCY MEDICINE

## 2021-06-23 RX ORDER — ONDANSETRON 4 MG/1
4 TABLET, ORALLY DISINTEGRATING ORAL EVERY 8 HOURS PRN
Qty: 15 TABLET | Refills: 0 | OUTPATIENT
Start: 2021-06-23 | End: 2021-09-19

## 2021-06-23 RX ORDER — CALCIUM CARBONATE 750 MG/1
TABLET, CHEWABLE ORAL
Status: ON HOLD | COMMUNITY
End: 2021-11-13

## 2021-06-23 RX ORDER — HYDROXYZINE HYDROCHLORIDE 25 MG/1
TABLET, FILM COATED ORAL
COMMUNITY
End: 2021-07-25

## 2021-06-23 RX ORDER — ONDANSETRON 4 MG/1
4 TABLET, ORALLY DISINTEGRATING ORAL ONCE
Status: COMPLETED | OUTPATIENT
Start: 2021-06-23 | End: 2021-06-23

## 2021-06-23 RX ORDER — DICYCLOMINE HYDROCHLORIDE 10 MG/ML
20 INJECTION INTRAMUSCULAR ONCE
Status: COMPLETED | OUTPATIENT
Start: 2021-06-23 | End: 2021-06-23

## 2021-06-23 RX ORDER — DICYCLOMINE HCL 20 MG
20 TABLET ORAL EVERY 6 HOURS
Qty: 20 TABLET | Refills: 0 | OUTPATIENT
Start: 2021-06-23 | End: 2021-09-19

## 2021-06-23 RX ORDER — BISOPROLOL FUMARATE 5 MG/1
2.5 TABLET, FILM COATED ORAL DAILY
COMMUNITY

## 2021-06-23 RX ORDER — OMEPRAZOLE 20 MG/1
CAPSULE, DELAYED RELEASE ORAL
Status: ON HOLD | COMMUNITY
End: 2021-11-13

## 2021-06-23 RX ORDER — NITROFURANTOIN 25; 75 MG/1; MG/1
100 CAPSULE ORAL 2 TIMES DAILY
Qty: 14 CAPSULE | Refills: 0 | Status: SHIPPED | OUTPATIENT
Start: 2021-06-23 | End: 2021-07-25

## 2021-06-23 RX ORDER — SODIUM CHLORIDE 0.9 % (FLUSH) 0.9 %
10 SYRINGE (ML) INJECTION AS NEEDED
Status: DISCONTINUED | OUTPATIENT
Start: 2021-06-23 | End: 2021-06-23 | Stop reason: HOSPADM

## 2021-06-23 RX ADMIN — ONDANSETRON 4 MG: 4 TABLET, ORALLY DISINTEGRATING ORAL at 02:18

## 2021-06-23 RX ADMIN — DICYCLOMINE HYDROCHLORIDE 20 MG: 20 INJECTION, SOLUTION INTRAMUSCULAR at 02:18

## 2021-07-09 ENCOUNTER — TREATMENT (OUTPATIENT)
Dept: PHYSICAL THERAPY | Facility: CLINIC | Age: 26
End: 2021-07-09

## 2021-07-09 DIAGNOSIS — M54.50 LOW BACK PAIN, UNSPECIFIED BACK PAIN LATERALITY, UNSPECIFIED CHRONICITY, UNSPECIFIED WHETHER SCIATICA PRESENT: ICD-10-CM

## 2021-07-09 DIAGNOSIS — M54.6 THORACIC BACK PAIN, UNSPECIFIED BACK PAIN LATERALITY, UNSPECIFIED CHRONICITY: Primary | ICD-10-CM

## 2021-07-09 DIAGNOSIS — R29.898 DECREASED STRENGTH OF TRUNK AND BACK: ICD-10-CM

## 2021-07-09 PROCEDURE — 97161 PT EVAL LOW COMPLEX 20 MIN: CPT | Performed by: PHYSICAL THERAPIST

## 2021-07-09 NOTE — PROGRESS NOTES
Physical Therapy Initial Evaluation and Plan of Care    Patient: Kinsey Suresh   : 1995  Diagnosis/ICD-10 Code:  Thoracic back pain, unspecified back pain laterality, unspecified chronicity [M54.6]  Referring practitioner: CATHY Hogue  Date of Initial Visit: 2021  Today's Date: 2021  Patient seen for 1 sessions           Subjective Questionnaire: Oswestry: 18/45 = 40%      Subjective Evaluation    History of Present Illness  Mechanism of injury: Pt has a history of mid to lower back pain which has been present since . Her pain began without known cause and she had approximately 2 sessions of PT about 3 years ago. She states bending forward and static sitting and standing increase her pain as well. Her pain will ease if she lies down. Her lower back is more painful than the thoracic area. She does have pain descending down the R LE.     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 Pt lives with her boyfriend in an apartment with steps to enter.     Pain  Current pain ratin  At best pain rating: 3  At worst pain ratin  Quality: sharp    Treatments  Previous treatment: physical therapy  Patient Goals  Patient goals for therapy: decreased pain, increased motion, increased strength and independence with ADLs/IADLs             Objective          Palpation   Left   Hypertonic in the lumbar paraspinals.   Tenderness of the lumbar paraspinals.     Right   Hypertonic in the lumbar paraspinals. Tenderness of the lumbar paraspinals.     Strength/Myotome Testing     Left Hip   Planes of Motion   Flexion: 3  Extension: 3  Abduction: 3+    Right Hip   Planes of Motion   Flexion: 3  Extension: 3  Abduction: 3+    Tests       Thoracic   Positive slump.     Lumbar     Left   Positive passive SLR.     Right   Positive passive SLR.     Additional Tests Details  Positive for neural tension on L, negative on R    Functional Assessment     Comments  Pt has no pain with  extension movements but increased pain with flexion.        See Exercise, Manual, and Modality Logs for complete treatment.       Assessment & Plan     Assessment  Impairments: abnormal or restricted ROM, activity intolerance, impaired physical strength, lacks appropriate home exercise program and pain with function  Functional Limitations: lifting, sleeping, walking, uncomfortable because of pain, moving in bed, sitting, standing, stooping and unable to perform repetitive tasks  Goals  Plan Goals: 1. The patient has complaints of pain.   LTG 1: 12 weeks:  The patient will report lower back pain no greater than 2/10 in order to improve tolerance with activities of daily living and improve sleep quality.   STATUS:  New   STG 1a: 6 weeks:  The patient will report lower back pain no greater than 5/10.    STATUS:  New      2. The patient has radicular symptoms.   LTG 2: 12 weeks: The patient will report improved radicular symptoms by 80 % order to improve tolerance with activities of daily living.   STATUS: New   STG 2a: 6 weeks: The patient will report improved radicular symptoms by 40%.   STATUS: New      3. The patient demonstrates weakness of the B hip.   LTG 3: 12 weeks:  The patient will demonstrate 4+/5 strength for B hip flexion, abduction, and extension in order to improve hip stability.   STATUS:  New   STG 3a: 6 weeks:  The patient will demonstrate 4/5 strength for B hip flexion, abduction, and extension.   STATUS:  New      4. Mobility: Walking/Moving Around Functional Limitation     LTG 4: 12 weeks:  The patient will demonstrate 12% limitation by achieving a score of 6 on the Oswestry Disability Questionnaire.   STATUS:  New   STG 4a: 6 weeks:  The patient will demonstrate 20% limitation by achieving a score of 10 on the Oswestry Disability Quiestionnaire.     STATUS:  New       Plan  Therapy options: will be seen for skilled physical therapy services  Planned modality interventions: dry needling, TENS,  thermotherapy (hydrocollator packs) and cryotherapy  Planned therapy interventions: abdominal trunk stabilization, body mechanics training, manual therapy, neuromuscular re-education, postural training, soft tissue mobilization, strengthening, stretching, therapeutic activities, joint mobilization, gait training, home exercise program, flexibility and functional ROM exercises  Frequency: 3x week  Duration in weeks: 12  Treatment plan discussed with: patient        History # of Personal Factors and/or Comorbidities: MODERATE (1-2)  Examination of Body System(s): # of elements: LOW (1-2)  Clinical Presentation: STABLE   Clinical Decision Making: LOW       Timed:         Manual Therapy:         mins  27612;     Therapeutic Exercise:         mins  52969;     Neuromuscular Marylou:        mins  44530;    Therapeutic Activity:          mins  51062;     Gait Training:           mins  24366;     Ultrasound:          mins  73072;    Ionto                                   mins   25803  Self Care                            mins   72463  Canalith Repos         mins 51627      Un-Timed:  Electrical Stimulation:         mins  34644 ( );  Dry Needling          mins self-pay  Traction          mins 28793  Low Eval     45     Mins  43644  Mod Eval          Mins  03582  High Eval                            Mins  39381  Re-Eval                               mins  54420        Timed Treatment:   0   mins   Total Treatment:     45   mins    PT SIGNATURE: Olga Lundy, MIGUELITO   DATE TREATMENT INITIATED: 7/9/2021    Initial Certification  Certification Period: 10/7/2021  I certify that the therapy services are furnished while this patient is under my care.  The services outlined above are required by this patient, and will be reviewed every 90 days.     PHYSICIAN: Paulina Gale, APRN      DATE:     Please sign and return via fax to [phone] Thank you, [facility].

## 2021-07-27 ENCOUNTER — DOCUMENTATION (OUTPATIENT)
Dept: PHYSICAL THERAPY | Facility: CLINIC | Age: 26
End: 2021-07-27

## 2021-07-27 NOTE — PROGRESS NOTES
Discharge Summary  Discharge Summary from Physical Therapy Report      Number of Visits: 1    Discharge Status of Patient: Pt did not return for therapy following her initial evaluation. She no showed for 4 visits without contacting clinic to r/s.    Goals: not reassessed due to non compliance with appts.     Discharge Plan: Pt will DC from therapy at this time.     Date of Discharge 7/27/21        Olga Lundy, PT  Physical Therapist

## 2021-08-24 PROCEDURE — 87086 URINE CULTURE/COLONY COUNT: CPT | Performed by: NURSE PRACTITIONER

## 2021-09-18 ENCOUNTER — HOSPITAL ENCOUNTER (EMERGENCY)
Facility: HOSPITAL | Age: 26
Discharge: HOME | End: 2021-09-19
Attending: EMERGENCY MEDICINE | Admitting: EMERGENCY MEDICINE

## 2021-09-18 VITALS
HEART RATE: 65 BPM | WEIGHT: 255.73 LBS | SYSTOLIC BLOOD PRESSURE: 113 MMHG | BODY MASS INDEX: 43.66 KG/M2 | DIASTOLIC BLOOD PRESSURE: 65 MMHG | HEIGHT: 64 IN | RESPIRATION RATE: 16 BRPM | TEMPERATURE: 98.7 F | OXYGEN SATURATION: 100 %

## 2021-09-18 DIAGNOSIS — J40 BRONCHITIS: ICD-10-CM

## 2021-09-18 DIAGNOSIS — R20.2 PARESTHESIA: ICD-10-CM

## 2021-09-18 DIAGNOSIS — N30.01 ACUTE CYSTITIS WITH HEMATURIA: Primary | ICD-10-CM

## 2021-09-18 LAB
ALBUMIN SERPL-MCNC: 4.5 G/DL (ref 3.5–5.2)
ALBUMIN/GLOB SERPL: 1.9 G/DL
ALP SERPL-CCNC: 110 U/L (ref 39–117)
ALT SERPL W P-5'-P-CCNC: 22 U/L (ref 1–33)
ANION GAP SERPL CALCULATED.3IONS-SCNC: 14.4 MMOL/L (ref 5–15)
AST SERPL-CCNC: 20 U/L (ref 1–32)
BACTERIA UR QL AUTO: ABNORMAL /HPF
BASOPHILS # BLD AUTO: 0.07 10*3/MM3 (ref 0–0.2)
BASOPHILS NFR BLD AUTO: 0.8 % (ref 0–1.5)
BILIRUB SERPL-MCNC: 0.2 MG/DL (ref 0–1.2)
BILIRUB UR QL STRIP: NEGATIVE
BUN SERPL-MCNC: 9 MG/DL (ref 6–20)
BUN/CREAT SERPL: 11.1 (ref 7–25)
CALCIUM SPEC-SCNC: 9.5 MG/DL (ref 8.6–10.5)
CHLORIDE SERPL-SCNC: 102 MMOL/L (ref 98–107)
CLARITY UR: ABNORMAL
CO2 SERPL-SCNC: 22.6 MMOL/L (ref 22–29)
COLOR UR: YELLOW
CREAT SERPL-MCNC: 0.81 MG/DL (ref 0.57–1)
DEPRECATED RDW RBC AUTO: 40.5 FL (ref 37–54)
EOSINOPHIL # BLD AUTO: 0.06 10*3/MM3 (ref 0–0.4)
EOSINOPHIL NFR BLD AUTO: 0.7 % (ref 0.3–6.2)
ERYTHROCYTE [DISTWIDTH] IN BLOOD BY AUTOMATED COUNT: 12.1 % (ref 12.3–15.4)
GFR SERPL CREATININE-BSD FRML MDRD: 87 ML/MIN/1.73
GLOBULIN UR ELPH-MCNC: 2.4 GM/DL
GLUCOSE SERPL-MCNC: 157 MG/DL (ref 65–99)
GLUCOSE UR STRIP-MCNC: NEGATIVE MG/DL
HCG SERPL QL: NEGATIVE
HCT VFR BLD AUTO: 44.7 % (ref 34–46.6)
HGB BLD-MCNC: 14.7 G/DL (ref 12–15.9)
HGB UR QL STRIP.AUTO: NEGATIVE
HOLD SPECIMEN: NORMAL
HYALINE CASTS UR QL AUTO: ABNORMAL /LPF
IMM GRANULOCYTES # BLD AUTO: 0.05 10*3/MM3 (ref 0–0.05)
IMM GRANULOCYTES NFR BLD AUTO: 0.5 % (ref 0–0.5)
KETONES UR QL STRIP: NEGATIVE
LEUKOCYTE ESTERASE UR QL STRIP.AUTO: ABNORMAL
LIPASE SERPL-CCNC: 39 U/L (ref 13–60)
LYMPHOCYTES # BLD AUTO: 1.38 10*3/MM3 (ref 0.7–3.1)
LYMPHOCYTES NFR BLD AUTO: 15 % (ref 19.6–45.3)
MCH RBC QN AUTO: 30.2 PG (ref 26.6–33)
MCHC RBC AUTO-ENTMCNC: 32.9 G/DL (ref 31.5–35.7)
MCV RBC AUTO: 91.8 FL (ref 79–97)
MONOCYTES # BLD AUTO: 0.5 10*3/MM3 (ref 0.1–0.9)
MONOCYTES NFR BLD AUTO: 5.4 % (ref 5–12)
NEUTROPHILS NFR BLD AUTO: 7.13 10*3/MM3 (ref 1.7–7)
NEUTROPHILS NFR BLD AUTO: 77.6 % (ref 42.7–76)
NITRITE UR QL STRIP: NEGATIVE
NRBC BLD AUTO-RTO: 0 /100 WBC (ref 0–0.2)
PH UR STRIP.AUTO: 6.5 [PH] (ref 5–8)
PLATELET # BLD AUTO: 199 10*3/MM3 (ref 140–450)
PMV BLD AUTO: 10.2 FL (ref 6–12)
POTASSIUM SERPL-SCNC: 4.5 MMOL/L (ref 3.5–5.2)
PROT SERPL-MCNC: 6.9 G/DL (ref 6–8.5)
PROT UR QL STRIP: NEGATIVE
RBC # BLD AUTO: 4.87 10*6/MM3 (ref 3.77–5.28)
RBC # UR: ABNORMAL /HPF
REF LAB TEST METHOD: ABNORMAL
SODIUM SERPL-SCNC: 139 MMOL/L (ref 136–145)
SP GR UR STRIP: 1.02 (ref 1–1.03)
SQUAMOUS #/AREA URNS HPF: ABNORMAL /HPF
TROPONIN T SERPL-MCNC: <0.01 NG/ML (ref 0–0.03)
UROBILINOGEN UR QL STRIP: ABNORMAL
WBC # BLD AUTO: 9.19 10*3/MM3 (ref 3.4–10.8)
WBC UR QL AUTO: ABNORMAL /HPF
WHOLE BLOOD HOLD SPECIMEN: NORMAL
WHOLE BLOOD HOLD SPECIMEN: NORMAL

## 2021-09-18 PROCEDURE — 84484 ASSAY OF TROPONIN QUANT: CPT

## 2021-09-18 PROCEDURE — 36415 COLL VENOUS BLD VENIPUNCTURE: CPT

## 2021-09-18 PROCEDURE — 81001 URINALYSIS AUTO W/SCOPE: CPT

## 2021-09-18 PROCEDURE — 84703 CHORIONIC GONADOTROPIN ASSAY: CPT

## 2021-09-18 PROCEDURE — 85025 COMPLETE CBC W/AUTO DIFF WBC: CPT

## 2021-09-18 PROCEDURE — 80053 COMPREHEN METABOLIC PANEL: CPT

## 2021-09-18 PROCEDURE — 99283 EMERGENCY DEPT VISIT LOW MDM: CPT

## 2021-09-18 PROCEDURE — 83690 ASSAY OF LIPASE: CPT

## 2021-09-18 PROCEDURE — 93005 ELECTROCARDIOGRAM TRACING: CPT

## 2021-09-18 PROCEDURE — 93005 ELECTROCARDIOGRAM TRACING: CPT | Performed by: EMERGENCY MEDICINE

## 2021-09-18 PROCEDURE — 93010 ELECTROCARDIOGRAM REPORT: CPT | Performed by: INTERNAL MEDICINE

## 2021-09-18 RX ORDER — SODIUM CHLORIDE 0.9 % (FLUSH) 0.9 %
10 SYRINGE (ML) INJECTION AS NEEDED
Status: DISCONTINUED | OUTPATIENT
Start: 2021-09-18 | End: 2021-09-19 | Stop reason: HOSPADM

## 2021-09-19 ENCOUNTER — APPOINTMENT (OUTPATIENT)
Dept: GENERAL RADIOLOGY | Facility: HOSPITAL | Age: 26
End: 2021-09-19

## 2021-09-19 LAB
QT INTERVAL: 394 MS
SARS-COV-2 N GENE RESP QL NAA+PROBE: DETECTED

## 2021-09-19 PROCEDURE — 25010000002 METHYLPREDNISOLONE PER 125 MG: Performed by: NURSE PRACTITIONER

## 2021-09-19 PROCEDURE — 87635 SARS-COV-2 COVID-19 AMP PRB: CPT | Performed by: NURSE PRACTITIONER

## 2021-09-19 PROCEDURE — C9803 HOPD COVID-19 SPEC COLLECT: HCPCS

## 2021-09-19 PROCEDURE — 71045 X-RAY EXAM CHEST 1 VIEW: CPT

## 2021-09-19 PROCEDURE — 25010000002 CEFTRIAXONE PER 250 MG: Performed by: NURSE PRACTITIONER

## 2021-09-19 PROCEDURE — 96375 TX/PRO/DX INJ NEW DRUG ADDON: CPT

## 2021-09-19 PROCEDURE — 96365 THER/PROPH/DIAG IV INF INIT: CPT

## 2021-09-19 RX ORDER — METHYLPREDNISOLONE SODIUM SUCCINATE 125 MG/2ML
125 INJECTION, POWDER, LYOPHILIZED, FOR SOLUTION INTRAMUSCULAR; INTRAVENOUS ONCE
Status: COMPLETED | OUTPATIENT
Start: 2021-09-19 | End: 2021-09-19

## 2021-09-19 RX ORDER — PREDNISONE 20 MG/1
60 TABLET ORAL DAILY
Qty: 15 TABLET | Refills: 0 | Status: SHIPPED | OUTPATIENT
Start: 2021-09-19 | End: 2021-09-24

## 2021-09-19 RX ORDER — CEPHALEXIN 500 MG/1
500 CAPSULE ORAL 4 TIMES DAILY
Qty: 28 CAPSULE | Refills: 0 | OUTPATIENT
Start: 2021-09-19 | End: 2021-09-25

## 2021-09-19 RX ADMIN — METHYLPREDNISOLONE SODIUM SUCCINATE 125 MG: 125 INJECTION, POWDER, FOR SOLUTION INTRAMUSCULAR; INTRAVENOUS at 01:15

## 2021-09-19 RX ADMIN — SODIUM CHLORIDE 1 G: 900 INJECTION INTRAVENOUS at 01:17

## 2021-09-19 NOTE — ED PROVIDER NOTES
Subjective     History provided by:  Patient  Cough  Cough characteristics:  Non-productive  Severity:  Moderate  Onset quality:  Gradual  Duration:  7 days  Timing:  Intermittent  Progression:  Waxing and waning  Chronicity:  New  Smoker: no    Context: upper respiratory infection    Context: not sick contacts    Relieved by:  Nothing  Worsened by:  Deep breathing and activity  Associated symptoms: shortness of breath and wheezing    Associated symptoms: no chest pain ( low ribs), no chills, no ear pain, no fever, no headaches and no rhinorrhea  Sore throat:  burning.        Review of Systems   Constitutional: Negative for chills and fever.   HENT: Negative for congestion, ear pain and rhinorrhea. Sore throat:  burning.    Eyes: Negative.  Negative for pain.   Respiratory: Positive for cough ( dry), shortness of breath and wheezing. Negative for chest tightness.    Cardiovascular: Negative for chest pain ( low ribs).   Gastrointestinal: Positive for nausea. Negative for diarrhea and vomiting. Abdominal pain:  lower ribs pain. no ab pain. pain with breaths.   Endocrine: Negative.    Genitourinary: Positive for frequency. Negative for dysuria, flank pain and hematuria.   Musculoskeletal: Negative for back pain and joint swelling.   Skin: Negative.  Negative for pallor.   Allergic/Immunologic: Negative.    Neurological: Positive for numbness ( right side body entire side on and off x 1 day). Negative for seizures and headaches.   Hematological: Negative.    Psychiatric/Behavioral: Negative.    All other systems reviewed and are negative.      Past Medical History:   Diagnosis Date   • Anxiety    • Asthma    • GERD (gastroesophageal reflux disease)    • Migraine    • PVC (premature ventricular contraction)        Allergies   Allergen Reactions   • Ciprofloxacin Rash   • Doxycycline Hyclate Rash   • Doxycycline Monohydrate Rash   • Latex Rash   • Sulfa Antibiotics Rash   • Sulfamethoxazole-Trimethoprim Rash and Hives    • Trimethoprim Rash       No past surgical history on file.    No family history on file.    Social History     Socioeconomic History   • Marital status: Single     Spouse name: Not on file   • Number of children: Not on file   • Years of education: Not on file   • Highest education level: Not on file   Tobacco Use   • Smoking status: Never Smoker   • Smokeless tobacco: Never Used   Vaping Use   • Vaping Use: Never assessed   Substance and Sexual Activity   • Alcohol use: No   • Drug use: No   • Sexual activity: Defer           Objective   Physical Exam  Vitals and nursing note reviewed.   Constitutional:       General: She is not in acute distress.     Appearance: Normal appearance. She is not toxic-appearing.   HENT:      Head: Normocephalic and atraumatic.      Mouth/Throat:      Mouth: Mucous membranes are moist.   Eyes:      Extraocular Movements: Extraocular movements intact.      Pupils: Pupils are equal, round, and reactive to light.   Cardiovascular:      Rate and Rhythm: Normal rate and regular rhythm.      Pulses: Normal pulses.      Heart sounds: Normal heart sounds.   Pulmonary:      Effort: Pulmonary effort is normal. No respiratory distress.      Breath sounds: Normal breath sounds.   Chest:      Chest wall: Tenderness ( bilateral lower lateral and anterior ribs) present.   Abdominal:      General: Abdomen is flat. Bowel sounds are normal.      Palpations: Abdomen is soft.      Tenderness: There is no abdominal tenderness. There is no right CVA tenderness or left CVA tenderness.   Musculoskeletal:         General: Normal range of motion.      Cervical back: Normal range of motion and neck supple.   Skin:     General: Skin is warm and dry.      Capillary Refill: Capillary refill takes less than 2 seconds.   Neurological:      General: No focal deficit present.      Mental Status: She is alert and oriented to person, place, and time. Mental status is at baseline.      Cranial Nerves: No cranial nerve  deficit.      Motor: No weakness.         Procedures      Narrative & Impression    HEART RATE= 60  bpm  RR Interval= 1008  ms  ME Interval= 134  ms  P Horizontal Axis= 69  deg  P Front Axis= -14  deg  QRSD Interval= 77  ms  QT Interval= 394  ms  QRS Axis= 26  deg  T Wave Axis= 2  deg  - NORMAL ECG -  Sinus rhythm           ED Course  ED Course as of Sep 19 0044   Sun Sep 19, 2021   0038 Negative   XR Chest 1 View [DS]      ED Course User Index  [DS] Alba Aguilar, MICHEAL                                           MDM  Number of Diagnoses or Management Options  Acute cystitis with hematuria  Bronchitis  Paresthesia  Diagnosis management comments: I have spoken with the patient. I have explained the patient´s condition, diagnoses and treatment plan based on the information available to me at this time. I have answered the patient's questions and addressed any concerns. The patient has a good  understanding of the patient´s diagnosis, condition, and treatment plan as can be expected at this point. The vital signs have been stable. The patient´s condition is stable and appropriate for discharge from the emergency department.      The patient will pursue further outpatient evaluation with the primary care physician or other designated or consulting physician as outlined in the discharge instructions. They are agreeable to this plan of care and follow-up instructions have been explained in detail. The patient has received these instructions in written format and have expressed an understanding of the discharge instructions. The patient is aware that any significant change in condition or worsening of symptoms should prompt an immediate return to this or the closest emergency department or call to 911.         Amount and/or Complexity of Data Reviewed  Clinical lab tests: reviewed and ordered  Tests in the radiology section of CPT®: reviewed and ordered  Tests in the medicine section of CPT®: reviewed and ordered    Risk of  Complications, Morbidity, and/or Mortality  Presenting problems: moderate  Diagnostic procedures: low  Management options: low    Patient Progress  Patient progress: stable      Final diagnoses:   Acute cystitis with hematuria   Bronchitis   Paresthesia       ED Disposition  ED Disposition     ED Disposition Condition Comment    Discharge Stable           Paulina Gale, APRN  [address] [facility]  [address]  [facility] [address]  [phone]    In 2 days  As needed         Medication List      New Prescriptions    cephalexin 500 MG capsule  Commonly known as: KEFLEX  Take 1 capsule by mouth 4 (Four) Times a Day for 7 days.     diclofenac 50 MG EC tablet  Commonly known as: VOLTAREN  Take 1 tablet by mouth 3 (Three) Times a Day.     predniSONE 20 MG tablet  Commonly known as: DELTASONE  Take 3 tablets by mouth Daily for 5 days.        Stop    ciprofloxacin 500 MG tablet  Commonly known as: CIPRO     dicyclomine 20 MG tablet  Commonly known as: BENTYL     fluticasone 50 MCG/ACT nasal spray  Commonly known as: FLONASE     ondansetron ODT 4 MG disintegrating tablet  Commonly known as: ZOFRAN-ODT           Where to Get Your Medications      These medications were sent to [id number] - [place], [place] - [address] AT [facility] - [phone]  - [phone]   [address] [facility] [address]    Hours: 24-hours Phone: [phone]   · cephalexin 500 MG capsule  · diclofenac 50 MG EC tablet  · predniSONE 20 MG tablet          Alba Aguilar APRN  09/19/21 0044

## 2021-09-19 NOTE — DISCHARGE INSTRUCTIONS
Rest  Alternate Tylenol/motrin for pain or fever  Drink plenty fluids  Take medications as prescribed.  Use your home rescue inhaler as needed  Follow up with pcp as needed  Go to ED for new or worsening symptoms

## 2021-09-19 NOTE — ED NOTES
To ER 23 w/ c/o cough, shortness of air, sore throat and nausea.     Cici Garza RN  09/19/21 0028

## 2021-09-23 ENCOUNTER — APPOINTMENT (OUTPATIENT)
Dept: GENERAL RADIOLOGY | Facility: HOSPITAL | Age: 26
End: 2021-09-23

## 2021-09-23 ENCOUNTER — HOSPITAL ENCOUNTER (EMERGENCY)
Facility: HOSPITAL | Age: 26
Discharge: HOME | End: 2021-09-23
Attending: EMERGENCY MEDICINE | Admitting: EMERGENCY MEDICINE

## 2021-09-23 VITALS
RESPIRATION RATE: 18 BRPM | WEIGHT: 252.43 LBS | DIASTOLIC BLOOD PRESSURE: 83 MMHG | HEIGHT: 64 IN | TEMPERATURE: 99 F | HEART RATE: 94 BPM | SYSTOLIC BLOOD PRESSURE: 114 MMHG | OXYGEN SATURATION: 96 % | BODY MASS INDEX: 43.1 KG/M2

## 2021-09-23 DIAGNOSIS — U07.1 COVID-19 VIRUS DETECTED: Primary | ICD-10-CM

## 2021-09-23 LAB
ALBUMIN SERPL-MCNC: 4.3 G/DL (ref 3.5–5.2)
ALBUMIN/GLOB SERPL: 1.5 G/DL
ALP SERPL-CCNC: 86 U/L (ref 39–117)
ALT SERPL W P-5'-P-CCNC: 17 U/L (ref 1–33)
ANION GAP SERPL CALCULATED.3IONS-SCNC: 10.9 MMOL/L (ref 5–15)
AST SERPL-CCNC: 19 U/L (ref 1–32)
BASOPHILS # BLD AUTO: 0.02 10*3/MM3 (ref 0–0.2)
BASOPHILS NFR BLD AUTO: 0.5 % (ref 0–1.5)
BILIRUB SERPL-MCNC: 0.3 MG/DL (ref 0–1.2)
BUN SERPL-MCNC: 8 MG/DL (ref 6–20)
BUN/CREAT SERPL: 9.1 (ref 7–25)
CALCIUM SPEC-SCNC: 8.5 MG/DL (ref 8.6–10.5)
CHLORIDE SERPL-SCNC: 103 MMOL/L (ref 98–107)
CLUMPED PLATELETS: PRESENT
CO2 SERPL-SCNC: 26.1 MMOL/L (ref 22–29)
CREAT SERPL-MCNC: 0.88 MG/DL (ref 0.57–1)
DEPRECATED RDW RBC AUTO: 40.7 FL (ref 37–54)
EOSINOPHIL # BLD AUTO: 0.03 10*3/MM3 (ref 0–0.4)
EOSINOPHIL NFR BLD AUTO: 0.7 % (ref 0.3–6.2)
ERYTHROCYTE [DISTWIDTH] IN BLOOD BY AUTOMATED COUNT: 12.1 % (ref 12.3–15.4)
GFR SERPL CREATININE-BSD FRML MDRD: 79 ML/MIN/1.73
GLOBULIN UR ELPH-MCNC: 2.8 GM/DL
GLUCOSE SERPL-MCNC: 86 MG/DL (ref 65–99)
HCT VFR BLD AUTO: 47.2 % (ref 34–46.6)
HGB BLD-MCNC: 15.6 G/DL (ref 12–15.9)
IMM GRANULOCYTES # BLD AUTO: 0.01 10*3/MM3 (ref 0–0.05)
IMM GRANULOCYTES NFR BLD AUTO: 0.2 % (ref 0–0.5)
LARGE PLATELETS: NORMAL
LYMPHOCYTES # BLD AUTO: 1.27 10*3/MM3 (ref 0.7–3.1)
LYMPHOCYTES NFR BLD AUTO: 29.5 % (ref 19.6–45.3)
MCH RBC QN AUTO: 30.3 PG (ref 26.6–33)
MCHC RBC AUTO-ENTMCNC: 33.1 G/DL (ref 31.5–35.7)
MCV RBC AUTO: 91.7 FL (ref 79–97)
MONOCYTES # BLD AUTO: 0.48 10*3/MM3 (ref 0.1–0.9)
MONOCYTES NFR BLD AUTO: 11.1 % (ref 5–12)
NEUTROPHILS NFR BLD AUTO: 2.5 10*3/MM3 (ref 1.7–7)
NEUTROPHILS NFR BLD AUTO: 58 % (ref 42.7–76)
NRBC BLD AUTO-RTO: 0 /100 WBC (ref 0–0.2)
PLATELET # BLD AUTO: 133 10*3/MM3 (ref 140–450)
PMV BLD AUTO: 10.2 FL (ref 6–12)
POTASSIUM SERPL-SCNC: 3.8 MMOL/L (ref 3.5–5.2)
PROCALCITONIN SERPL-MCNC: 0.08 NG/ML (ref 0–0.25)
PROT SERPL-MCNC: 7.1 G/DL (ref 6–8.5)
RBC # BLD AUTO: 5.15 10*6/MM3 (ref 3.77–5.28)
RBC MORPH BLD: NORMAL
SMALL PLATELETS BLD QL SMEAR: NORMAL
SODIUM SERPL-SCNC: 140 MMOL/L (ref 136–145)
WBC # BLD AUTO: 4.31 10*3/MM3 (ref 3.4–10.8)
WBC MORPH BLD: NORMAL

## 2021-09-23 PROCEDURE — 94640 AIRWAY INHALATION TREATMENT: CPT

## 2021-09-23 PROCEDURE — 85025 COMPLETE CBC W/AUTO DIFF WBC: CPT | Performed by: PHYSICIAN ASSISTANT

## 2021-09-23 PROCEDURE — 96375 TX/PRO/DX INJ NEW DRUG ADDON: CPT

## 2021-09-23 PROCEDURE — 94799 UNLISTED PULMONARY SVC/PX: CPT

## 2021-09-23 PROCEDURE — 25010000002 ONDANSETRON PER 1 MG: Performed by: PHYSICIAN ASSISTANT

## 2021-09-23 PROCEDURE — 80053 COMPREHEN METABOLIC PANEL: CPT | Performed by: PHYSICIAN ASSISTANT

## 2021-09-23 PROCEDURE — 96374 THER/PROPH/DIAG INJ IV PUSH: CPT

## 2021-09-23 PROCEDURE — 71045 X-RAY EXAM CHEST 1 VIEW: CPT

## 2021-09-23 PROCEDURE — 99283 EMERGENCY DEPT VISIT LOW MDM: CPT

## 2021-09-23 PROCEDURE — 85007 BL SMEAR W/DIFF WBC COUNT: CPT | Performed by: PHYSICIAN ASSISTANT

## 2021-09-23 PROCEDURE — 84145 PROCALCITONIN (PCT): CPT | Performed by: PHYSICIAN ASSISTANT

## 2021-09-23 PROCEDURE — 25010000002 KETOROLAC TROMETHAMINE PER 15 MG: Performed by: PHYSICIAN ASSISTANT

## 2021-09-23 RX ORDER — AZITHROMYCIN 250 MG/1
TABLET, FILM COATED ORAL
Qty: 6 TABLET | Refills: 0 | Status: ON HOLD | OUTPATIENT
Start: 2021-09-23 | End: 2021-11-13

## 2021-09-23 RX ORDER — ONDANSETRON 2 MG/ML
4 INJECTION INTRAMUSCULAR; INTRAVENOUS ONCE
Status: COMPLETED | OUTPATIENT
Start: 2021-09-23 | End: 2021-09-23

## 2021-09-23 RX ORDER — SODIUM CHLORIDE 0.9 % (FLUSH) 0.9 %
10 SYRINGE (ML) INJECTION AS NEEDED
Status: DISCONTINUED | OUTPATIENT
Start: 2021-09-23 | End: 2021-09-23 | Stop reason: HOSPADM

## 2021-09-23 RX ORDER — IPRATROPIUM BROMIDE AND ALBUTEROL SULFATE 2.5; .5 MG/3ML; MG/3ML
3 SOLUTION RESPIRATORY (INHALATION) ONCE
Status: COMPLETED | OUTPATIENT
Start: 2021-09-23 | End: 2021-09-23

## 2021-09-23 RX ORDER — KETOROLAC TROMETHAMINE 10 MG/1
10 TABLET, FILM COATED ORAL EVERY 6 HOURS PRN
Qty: 12 TABLET | Refills: 0 | Status: ON HOLD | OUTPATIENT
Start: 2021-09-23 | End: 2021-11-13

## 2021-09-23 RX ORDER — HYDROXYZINE HYDROCHLORIDE 25 MG/1
25 TABLET, FILM COATED ORAL 3 TIMES DAILY PRN
Qty: 20 TABLET | Refills: 0 | Status: SHIPPED | OUTPATIENT
Start: 2021-09-23 | End: 2022-01-09

## 2021-09-23 RX ORDER — KETOROLAC TROMETHAMINE 30 MG/ML
30 INJECTION, SOLUTION INTRAMUSCULAR; INTRAVENOUS ONCE
Status: COMPLETED | OUTPATIENT
Start: 2021-09-23 | End: 2021-09-23

## 2021-09-23 RX ORDER — ONDANSETRON 4 MG/1
4 TABLET, ORALLY DISINTEGRATING ORAL 4 TIMES DAILY PRN
Qty: 12 TABLET | Refills: 0 | Status: ON HOLD | OUTPATIENT
Start: 2021-09-23 | End: 2021-11-14 | Stop reason: SDUPTHER

## 2021-09-23 RX ADMIN — IPRATROPIUM BROMIDE AND ALBUTEROL SULFATE 3 ML: .5; 2.5 SOLUTION RESPIRATORY (INHALATION) at 14:15

## 2021-09-23 RX ADMIN — KETOROLAC TROMETHAMINE 30 MG: 30 INJECTION, SOLUTION INTRAMUSCULAR; INTRAVENOUS at 15:39

## 2021-09-23 RX ADMIN — ONDANSETRON 4 MG: 2 INJECTION INTRAMUSCULAR; INTRAVENOUS at 15:39

## 2021-09-23 RX ADMIN — SODIUM CHLORIDE 1000 ML: 9 INJECTION, SOLUTION INTRAVENOUS at 15:39

## 2021-09-23 NOTE — ED PROVIDER NOTES
Subjective   26-year-old female presents to the emergency department with complaints of intermittent shortness of breath that started yesterday, productive cough, congestion, nausea without vomiting, and general body aches x5 days.  Patient reports testing positive for Covid on Sunday, day 5 since symptom onset.  Patient reports a history of asthma for which she is controlled using albuterol inhalers prescribed by her PCP.  Patient says she called her PCP for symptomatic relief upon initial Covid positivity, her PCP prescribed duo nebs, Tessalon Pearls, and Bromfed for her outpatient.  Patient also experiencing intermittent fevers, T-max 100.6, with resolution using ibuprofen. Patient does not have any other complaints at this time, resting comfortably upon initial exam.      History provided by:  Patient   used: No    Fatigue  Severity:  Moderate  Onset quality:  Gradual  Duration:  5 days  Timing:  Constant  Progression:  Worsening  Chronicity:  New  Associated symptoms: congestion, cough, fatigue, fever, nausea and shortness of breath    Associated symptoms: no abdominal pain, no chest pain, no diarrhea, no ear pain, no headaches, no loss of consciousness, no myalgias, no rash, no rhinorrhea, no sore throat, no vomiting and no wheezing    Congestion:     Location:  Nasal    Interferes with sleep: no      Interferes with eating/drinking: no    Cough:     Cough characteristics:  Productive    Sputum characteristics:  Clear    Severity:  Moderate    Onset quality:  Gradual    Duration:  5 days    Timing:  Intermittent    Progression:  Waxing and waning  Fatigue:     Severity:  Moderate    Duration:  5 days    Timing:  Constant    Progression:  Worsening  Fever:     Duration:  5 days    Timing:  Intermittent    Max temp PTA:  100.6    Temp source:  Oral    Progression:  Resolved  Nausea:     Severity:  Moderate    Onset quality:  Gradual    Duration:  5 days    Timing:  Intermittent     Progression:  Worsening  Shortness of breath:     Severity:  Moderate    Onset quality:  Gradual    Duration:  5 days    Timing:  Intermittent    Progression:  Worsening  Shortness of Breath  Associated symptoms: cough and fever    Associated symptoms: no abdominal pain, no chest pain, no ear pain, no headaches, no rash, no sore throat, no vomiting and no wheezing        Review of Systems   Constitutional: Positive for fatigue and fever. Negative for chills.   HENT: Positive for congestion. Negative for ear pain, rhinorrhea and sore throat.    Eyes: Negative for pain.   Respiratory: Positive for cough and shortness of breath. Negative for chest tightness and wheezing.    Cardiovascular: Negative for chest pain.   Gastrointestinal: Positive for nausea. Negative for abdominal pain, diarrhea and vomiting.   Genitourinary: Negative for flank pain and hematuria.   Musculoskeletal: Negative for joint swelling and myalgias.   Skin: Negative for pallor and rash.   Neurological: Negative for seizures, loss of consciousness and headaches.   All other systems reviewed and are negative.      Past Medical History:   Diagnosis Date   • Anxiety    • Asthma    • GERD (gastroesophageal reflux disease)    • Migraine    • PVC (premature ventricular contraction)        Allergies   Allergen Reactions   • Ciprofloxacin Rash   • Doxycycline Hyclate Rash   • Doxycycline Monohydrate Rash   • Latex Rash   • Sulfa Antibiotics Rash   • Sulfamethoxazole-Trimethoprim Rash and Hives   • Trimethoprim Rash       No past surgical history on file.    No family history on file.    Social History     Socioeconomic History   • Marital status: Single     Spouse name: Not on file   • Number of children: Not on file   • Years of education: Not on file   • Highest education level: Not on file   Tobacco Use   • Smoking status: Never Smoker   • Smokeless tobacco: Never Used   Vaping Use   • Vaping Use: Never assessed   Substance and Sexual Activity   •  Alcohol use: No   • Drug use: No   • Sexual activity: Defer           Objective   Physical Exam  Vitals and nursing note reviewed.   Constitutional:       General: She is not in acute distress.     Appearance: Normal appearance. She is not toxic-appearing.   HENT:      Head: Normocephalic and atraumatic.      Mouth/Throat:      Mouth: Mucous membranes are moist.   Eyes:      Extraocular Movements: Extraocular movements intact.      Pupils: Pupils are equal, round, and reactive to light.   Cardiovascular:      Rate and Rhythm: Normal rate and regular rhythm.      Pulses: Normal pulses.      Heart sounds: Normal heart sounds.   Pulmonary:      Effort: Pulmonary effort is normal. No respiratory distress.      Breath sounds: Examination of the right-upper field reveals wheezing. Examination of the left-upper field reveals wheezing. Examination of the right-lower field reveals rales. Examination of the left-lower field reveals rales. Wheezing and rales present.   Abdominal:      General: Abdomen is flat.      Palpations: Abdomen is soft.      Tenderness: There is no abdominal tenderness.   Musculoskeletal:         General: Normal range of motion.      Cervical back: Normal range of motion and neck supple.   Skin:     General: Skin is warm and dry.   Neurological:      Mental Status: She is alert and oriented to person, place, and time. Mental status is at baseline.         Procedures           ED Course  ED Course as of Sep 23 1634   Thu Sep 23, 2021   1626 Spoke with the nurse, patient requesting to leave due to resolution of symptoms and IV being uncomfortable to her. Patient labs returned normal. Imaging shows pneumonia for which she will be treated outpatient.     [KF]      ED Course User Index  [KF] Olga Simon PA-C                                           MDM  Number of Diagnoses or Management Options  Diagnosis management comments: The patient is well-appearing and in no respiratory distress.  The patient  was tested for COVID-19 in the emergency department and is currently awaiting results.  The patient has a normal mental status and is neurologically intact. The patient appears well and is able to tolerate food for fluid by mouth and there's no significant dehydration. There is no respiratory distress and no signs of systemic toxicity. The history, exam, diagnostic testing and current condition do not demonstrate an infectious process such as meningitis, retropharyngeal abscess, epiglottitis, sepsis or other serious bacterial infection requiring further testing, treatment, consultation, or admission at this time. The patient has no additional oxygen requirement. The patient has a chest x-ray that is suggestive of pneumonia. The patient has a PORT score that is suitable for outpatient treatment.  The patient has no respiratory distress, or hypoxia.  The vital signs have been stable. The patient's condition is stable and appropriate for discharge. The possible party was advised to return for worsening fever, respiratory distress, intractable vomiting, weakness, shortness of breath, chest pain, or altered mental status. The responsible party will pursue further outpatient evaluation with the primary care physician. The possible party has expressed a clear and thorough understanding and agreed to follow-up as instructed.       Amount and/or Complexity of Data Reviewed  Clinical lab tests: ordered and reviewed  Tests in the radiology section of CPT®: reviewed and ordered    Risk of Complications, Morbidity, and/or Mortality  Presenting problems: low  Diagnostic procedures: low  Management options: low  General comments: Patient labs and imaging were discussed with patient at bedside.  Patient was currently resting comfortably in bed, O2 97% prior to discharge.  Patient requested IV be removed due to it making her feel uncomfortable.  Patient will be sent home on antibiotics, breathing treatments, anti-inflammatories and  antinausea medications.  Patient is agreeable to this treatment plan and verbalized understanding at this time.  She was instructed to return to the emergency department for any new or worsening symptoms including shortness of breath, trouble breathing, chest pain, abdominal pain, increased respiratory effort, bowel or bladder complaints, or worsening course.    Patient Progress  Patient progress: stable      Final diagnoses:   COVID-19 virus detected       ED Disposition  ED Disposition     ED Disposition Condition Comment    Discharge Stable           Paulina Gale, APRN  [address] [facility]  [address]  [place] [id number] [phone]    Schedule an appointment as soon as possible for a visit   As needed, If symptoms worsen         Medication List      New Prescriptions    azithromycin 250 MG tablet  Commonly known as: Zithromax Z-Nikhil  Take 2 tablets by mouth on day 1, then 1 tablet daily on days 2-5     hydrOXYzine 25 MG tablet  Commonly known as: ATARAX  Take 1 tablet by mouth 3 (Three) Times a Day As Needed for Allergies or Anxiety.     ketorolac 10 MG tablet  Commonly known as: TORADOL  Take 1 tablet by mouth Every 6 (Six) Hours As Needed for Moderate Pain .     ondansetron ODT 4 MG disintegrating tablet  Commonly known as: ZOFRAN-ODT  Place 1 tablet under the tongue 4 (Four) Times a Day As Needed for Nausea or Vomiting.           Where to Get Your Medications      These medications were sent to [id number] - [place], [place] - [address] AT [facility] - [phone] [facility] [phone] FX  [address] [facility] [address]    Hours: 24-hours Phone: [phone]   · azithromycin 250 MG tablet  · hydrOXYzine 25 MG tablet  · ketorolac 10 MG tablet  · ondansetron ODT 4 MG disintegrating tablet          Olga Simon PA-C  09/23/21 8674

## 2021-09-24 ENCOUNTER — APPOINTMENT (OUTPATIENT)
Dept: GENERAL RADIOLOGY | Facility: HOSPITAL | Age: 26
End: 2021-09-24

## 2021-09-24 ENCOUNTER — HOSPITAL ENCOUNTER (EMERGENCY)
Facility: HOSPITAL | Age: 26
Discharge: HOME | End: 2021-09-25
Attending: EMERGENCY MEDICINE | Admitting: EMERGENCY MEDICINE

## 2021-09-24 DIAGNOSIS — U07.1 COVID-19: ICD-10-CM

## 2021-09-24 DIAGNOSIS — U07.1 PNEUMONIA DUE TO COVID-19 VIRUS: Primary | ICD-10-CM

## 2021-09-24 DIAGNOSIS — N39.0 URINARY TRACT INFECTION IN FEMALE: ICD-10-CM

## 2021-09-24 DIAGNOSIS — J12.82 PNEUMONIA DUE TO COVID-19 VIRUS: Primary | ICD-10-CM

## 2021-09-24 LAB
ALBUMIN SERPL-MCNC: 4 G/DL (ref 3.5–5.2)
ALBUMIN/GLOB SERPL: 1.5 G/DL
ALP SERPL-CCNC: 75 U/L (ref 39–117)
ALT SERPL W P-5'-P-CCNC: 19 U/L (ref 1–33)
ANION GAP SERPL CALCULATED.3IONS-SCNC: 13.8 MMOL/L (ref 5–15)
AST SERPL-CCNC: 22 U/L (ref 1–32)
BACTERIA UR QL AUTO: ABNORMAL /HPF
BASOPHILS # BLD AUTO: 0.02 10*3/MM3 (ref 0–0.2)
BASOPHILS NFR BLD AUTO: 0.3 % (ref 0–1.5)
BILIRUB SERPL-MCNC: 0.4 MG/DL (ref 0–1.2)
BILIRUB UR QL STRIP: NEGATIVE
BUN SERPL-MCNC: 9 MG/DL (ref 6–20)
BUN/CREAT SERPL: 8.8 (ref 7–25)
CALCIUM SPEC-SCNC: 8.5 MG/DL (ref 8.6–10.5)
CHLORIDE SERPL-SCNC: 103 MMOL/L (ref 98–107)
CLARITY UR: ABNORMAL
CO2 SERPL-SCNC: 23.2 MMOL/L (ref 22–29)
COLOR UR: YELLOW
CREAT SERPL-MCNC: 1.02 MG/DL (ref 0.57–1)
DEPRECATED RDW RBC AUTO: 40.6 FL (ref 37–54)
EOSINOPHIL # BLD AUTO: 0.02 10*3/MM3 (ref 0–0.4)
EOSINOPHIL NFR BLD AUTO: 0.3 % (ref 0.3–6.2)
ERYTHROCYTE [DISTWIDTH] IN BLOOD BY AUTOMATED COUNT: 12.2 % (ref 12.3–15.4)
GFR SERPL CREATININE-BSD FRML MDRD: 67 ML/MIN/1.73
GLOBULIN UR ELPH-MCNC: 2.6 GM/DL
GLUCOSE SERPL-MCNC: 106 MG/DL (ref 65–99)
GLUCOSE UR STRIP-MCNC: NEGATIVE MG/DL
HCG INTACT+B SERPL-ACNC: <0.5 MIU/ML
HCT VFR BLD AUTO: 44.4 % (ref 34–46.6)
HGB BLD-MCNC: 14.7 G/DL (ref 12–15.9)
HGB UR QL STRIP.AUTO: NEGATIVE
HOLD SPECIMEN: NORMAL
HOLD SPECIMEN: NORMAL
HYALINE CASTS UR QL AUTO: ABNORMAL /LPF
IMM GRANULOCYTES # BLD AUTO: 0.03 10*3/MM3 (ref 0–0.05)
IMM GRANULOCYTES NFR BLD AUTO: 0.4 % (ref 0–0.5)
KETONES UR QL STRIP: NEGATIVE
LEUKOCYTE ESTERASE UR QL STRIP.AUTO: ABNORMAL
LIPASE SERPL-CCNC: 36 U/L (ref 13–60)
LYMPHOCYTES # BLD AUTO: 0.59 10*3/MM3 (ref 0.7–3.1)
LYMPHOCYTES NFR BLD AUTO: 8.7 % (ref 19.6–45.3)
MCH RBC QN AUTO: 30.1 PG (ref 26.6–33)
MCHC RBC AUTO-ENTMCNC: 33.1 G/DL (ref 31.5–35.7)
MCV RBC AUTO: 91 FL (ref 79–97)
MONOCYTES # BLD AUTO: 0.09 10*3/MM3 (ref 0.1–0.9)
MONOCYTES NFR BLD AUTO: 1.3 % (ref 5–12)
NEUTROPHILS NFR BLD AUTO: 6.05 10*3/MM3 (ref 1.7–7)
NEUTROPHILS NFR BLD AUTO: 89 % (ref 42.7–76)
NITRITE UR QL STRIP: NEGATIVE
NRBC BLD AUTO-RTO: 0 /100 WBC (ref 0–0.2)
PH UR STRIP.AUTO: 5.5 [PH] (ref 5–8)
PLATELET # BLD AUTO: 107 10*3/MM3 (ref 140–450)
PMV BLD AUTO: 10.4 FL (ref 6–12)
POTASSIUM SERPL-SCNC: 3.9 MMOL/L (ref 3.5–5.2)
PROT SERPL-MCNC: 6.6 G/DL (ref 6–8.5)
PROT UR QL STRIP: ABNORMAL
RBC # BLD AUTO: 4.88 10*6/MM3 (ref 3.77–5.28)
RBC # UR: ABNORMAL /HPF
RBC MORPH BLD: NORMAL
REF LAB TEST METHOD: ABNORMAL
SMALL PLATELETS BLD QL SMEAR: NORMAL
SODIUM SERPL-SCNC: 140 MMOL/L (ref 136–145)
SP GR UR STRIP: 1.02 (ref 1–1.03)
SQUAMOUS #/AREA URNS HPF: ABNORMAL /HPF
UROBILINOGEN UR QL STRIP: ABNORMAL
WBC # BLD AUTO: 6.8 10*3/MM3 (ref 3.4–10.8)
WBC MORPH BLD: NORMAL
WBC UR QL AUTO: ABNORMAL /HPF
WHOLE BLOOD HOLD SPECIMEN: NORMAL
WHOLE BLOOD HOLD SPECIMEN: NORMAL

## 2021-09-24 PROCEDURE — 80053 COMPREHEN METABOLIC PANEL: CPT | Performed by: EMERGENCY MEDICINE

## 2021-09-24 PROCEDURE — 83690 ASSAY OF LIPASE: CPT | Performed by: EMERGENCY MEDICINE

## 2021-09-24 PROCEDURE — 99283 EMERGENCY DEPT VISIT LOW MDM: CPT

## 2021-09-24 PROCEDURE — 84702 CHORIONIC GONADOTROPIN TEST: CPT | Performed by: EMERGENCY MEDICINE

## 2021-09-24 PROCEDURE — 71045 X-RAY EXAM CHEST 1 VIEW: CPT

## 2021-09-24 PROCEDURE — 85025 COMPLETE CBC W/AUTO DIFF WBC: CPT | Performed by: EMERGENCY MEDICINE

## 2021-09-24 PROCEDURE — 81001 URINALYSIS AUTO W/SCOPE: CPT | Performed by: EMERGENCY MEDICINE

## 2021-09-24 PROCEDURE — 85007 BL SMEAR W/DIFF WBC COUNT: CPT | Performed by: EMERGENCY MEDICINE

## 2021-09-24 RX ORDER — ONDANSETRON 2 MG/ML
4 INJECTION INTRAMUSCULAR; INTRAVENOUS ONCE
Status: DISCONTINUED | OUTPATIENT
Start: 2021-09-24 | End: 2021-09-25 | Stop reason: HOSPADM

## 2021-09-24 RX ORDER — SODIUM CHLORIDE 0.9 % (FLUSH) 0.9 %
10 SYRINGE (ML) INJECTION AS NEEDED
Status: DISCONTINUED | OUTPATIENT
Start: 2021-09-24 | End: 2021-09-25 | Stop reason: HOSPADM

## 2021-09-24 RX ADMIN — SODIUM CHLORIDE 1000 ML: 9 INJECTION, SOLUTION INTRAVENOUS at 22:58

## 2021-09-25 VITALS
HEART RATE: 100 BPM | TEMPERATURE: 99.3 F | HEIGHT: 64 IN | DIASTOLIC BLOOD PRESSURE: 55 MMHG | SYSTOLIC BLOOD PRESSURE: 98 MMHG | BODY MASS INDEX: 42.57 KG/M2 | RESPIRATION RATE: 20 BRPM | OXYGEN SATURATION: 93 % | WEIGHT: 249.34 LBS

## 2021-09-25 RX ORDER — FLUCONAZOLE 150 MG/1
150 TABLET ORAL ONCE
Qty: 1 TABLET | Refills: 0 | Status: SHIPPED | OUTPATIENT
Start: 2021-09-25 | End: 2021-09-25

## 2021-09-25 RX ORDER — PHENAZOPYRIDINE HYDROCHLORIDE 200 MG/1
200 TABLET, FILM COATED ORAL 3 TIMES DAILY PRN
Qty: 10 TABLET | Refills: 0 | Status: ON HOLD | OUTPATIENT
Start: 2021-09-25 | End: 2021-11-13

## 2021-09-25 RX ORDER — NITROFURANTOIN 25; 75 MG/1; MG/1
100 CAPSULE ORAL 2 TIMES DAILY
Qty: 10 CAPSULE | Refills: 0 | Status: ON HOLD | OUTPATIENT
Start: 2021-09-25 | End: 2021-11-13

## 2021-09-25 NOTE — ED PROVIDER NOTES
"Subjective   Patient was seen in the emergency department on Saturday and diagnosed with COVID-19.  Her symptoms started Tuesday of last week and include fever, diarrhea, nausea, shortness of breath, and cough.  He returned to the emergency department today because she states she continues to feel bad and does not seem to be getting any better.  Does report that she has only taken 1 dose of her previously prescribed azithromycin and that she has been taking Keflex for a urinary tract infection but states that she has been told that she is \"immune to Keflex\".      History provided by:  Patient   used: No        Review of Systems   Constitutional: Positive for chills and fever.   HENT: Negative for congestion, ear pain and sore throat.    Eyes: Negative for pain.   Respiratory: Positive for cough and shortness of breath. Negative for chest tightness.    Cardiovascular: Negative for chest pain.   Gastrointestinal: Positive for diarrhea. Negative for abdominal pain, nausea and vomiting.   Genitourinary: Negative for flank pain and hematuria.   Musculoskeletal: Negative for joint swelling.   Skin: Negative for pallor.   Neurological: Negative for seizures and headaches.   All other systems reviewed and are negative.      Past Medical History:   Diagnosis Date   • Anxiety    • Asthma    • GERD (gastroesophageal reflux disease)    • Migraine    • PVC (premature ventricular contraction)        Allergies   Allergen Reactions   • Ciprofloxacin Rash   • Doxycycline Hyclate Rash   • Doxycycline Monohydrate Rash   • Latex Rash   • Sulfa Antibiotics Rash   • Sulfamethoxazole-Trimethoprim Rash and Hives   • Trimethoprim Rash       History reviewed. No pertinent surgical history.    History reviewed. No pertinent family history.    Social History     Socioeconomic History   • Marital status: Single     Spouse name: Not on file   • Number of children: Not on file   • Years of education: Not on file   • Highest " education level: Not on file   Tobacco Use   • Smoking status: Never Smoker   • Smokeless tobacco: Never Used   Vaping Use   • Vaping Use: Never assessed   Substance and Sexual Activity   • Alcohol use: No   • Drug use: No   • Sexual activity: Defer           Objective   Physical Exam  Vitals and nursing note reviewed.   Constitutional:       General: She is not in acute distress.     Appearance: Normal appearance. She is obese. She is not ill-appearing or toxic-appearing.   HENT:      Head: Normocephalic and atraumatic.      Mouth/Throat:      Mouth: Mucous membranes are moist.   Eyes:      Conjunctiva/sclera: Conjunctivae normal.      Pupils: Pupils are equal, round, and reactive to light.   Cardiovascular:      Rate and Rhythm: Regular rhythm. Tachycardia present.      Heart sounds: Normal heart sounds.   Pulmonary:      Effort: Pulmonary effort is normal. No respiratory distress.      Breath sounds: Normal breath sounds.   Abdominal:      General: Bowel sounds are normal.      Palpations: Abdomen is soft.      Tenderness: There is no abdominal tenderness.   Musculoskeletal:         General: Normal range of motion.      Cervical back: Normal range of motion and neck supple.   Skin:     General: Skin is warm and dry.      Capillary Refill: Capillary refill takes less than 2 seconds.   Neurological:      General: No focal deficit present.      Mental Status: She is alert and oriented to person, place, and time.   Psychiatric:         Mood and Affect: Mood normal.         Procedures           ED Course                                           MDM  Number of Diagnoses or Management Options  COVID-19: established and worsening  Pneumonia due to COVID-19 virus: new and requires workup     Amount and/or Complexity of Data Reviewed  Clinical lab tests: reviewed  Tests in the radiology section of CPT®: reviewed and ordered  Decide to obtain previous medical records or to obtain history from someone other than the  patient: yes  Review and summarize past medical records: yes  Independent visualization of images, tracings, or specimens: yes    Patient Progress  Patient progress: stable      Final diagnoses:   COVID-19   Pneumonia due to COVID-19 virus   Urinary tract infection in female       ED Disposition  ED Disposition     ED Disposition Condition Comment    Discharge Stable           Paulina Gale, APRN  [address] [facility]  [address]  [facility] [id number] [phone]      As needed         Medication List      New Prescriptions    fluconazole 150 MG tablet  Commonly known as: DIFLUCAN  Take 1 tablet by mouth 1 (One) Time for 1 dose. Take after antibiotics     nitrofurantoin (macrocrystal-monohydrate) 100 MG capsule  Commonly known as: MACROBID  Take 1 capsule by mouth 2 (Two) Times a Day.     phenazopyridine 200 MG tablet  Commonly known as: PYRIDIUM  Take 1 tablet by mouth 3 (Three) Times a Day As Needed for Bladder Spasms.        Stop    cephalexin 500 MG capsule  Commonly known as: KEFLEX        ASK your doctor about these medications    predniSONE 20 MG tablet  Commonly known as: DELTASONE  Take 3 tablets by mouth Daily for 5 days.  Ask about: Should I take this medication?           Where to Get Your Medications      These medications were sent to [id number] [place] - [address] AT [facility] - [phone]  - [phone]   [address] [facility] [place] [address]    Hours: 24-hours Phone: [phone]   · fluconazole 150 MG tablet  · nitrofurantoin (macrocrystal-monohydrate) 100 MG capsule  · phenazopyridine 200 MG tablet          Dayan Prasad, MICHEAL  09/25/21 9052

## 2021-09-25 NOTE — DISCHARGE INSTRUCTIONS
Continue to take your medications as prescribed previously.  I recommend that you get a pulse oximeter and monitor your oxygen level at home and if you become short of breath and your oxygen drops below 90% and you cannot recover with albuterol and rest, please return to the emergency department.

## 2021-11-12 ENCOUNTER — LAB (OUTPATIENT)
Dept: LAB | Facility: HOSPITAL | Age: 26
End: 2021-11-12

## 2021-11-12 ENCOUNTER — TRANSCRIBE ORDERS (OUTPATIENT)
Dept: LAB | Facility: HOSPITAL | Age: 26
End: 2021-11-12

## 2021-11-12 DIAGNOSIS — Z11.3 SCREEN FOR SEXUALLY TRANSMITTED DISEASES: Primary | ICD-10-CM

## 2021-11-12 DIAGNOSIS — Z11.3 SCREEN FOR SEXUALLY TRANSMITTED DISEASES: ICD-10-CM

## 2021-11-12 LAB — HCV AB SER DONR QL: NORMAL

## 2021-11-12 PROCEDURE — G0378 HOSPITAL OBSERVATION PER HR: HCPCS

## 2021-11-12 PROCEDURE — 99284 EMERGENCY DEPT VISIT MOD MDM: CPT

## 2021-11-12 PROCEDURE — 86803 HEPATITIS C AB TEST: CPT

## 2021-11-12 PROCEDURE — 36415 COLL VENOUS BLD VENIPUNCTURE: CPT

## 2021-11-12 RX ORDER — SODIUM CHLORIDE 0.9 % (FLUSH) 0.9 %
10 SYRINGE (ML) INJECTION AS NEEDED
Status: DISCONTINUED | OUTPATIENT
Start: 2021-11-12 | End: 2021-11-14 | Stop reason: HOSPADM

## 2021-11-13 ENCOUNTER — PREP FOR SURGERY (OUTPATIENT)
Dept: OTHER | Facility: HOSPITAL | Age: 26
End: 2021-11-13

## 2021-11-13 ENCOUNTER — ANESTHESIA EVENT (OUTPATIENT)
Dept: PERIOP | Facility: HOSPITAL | Age: 26
End: 2021-11-13

## 2021-11-13 ENCOUNTER — APPOINTMENT (OUTPATIENT)
Dept: GENERAL RADIOLOGY | Facility: HOSPITAL | Age: 26
End: 2021-11-13

## 2021-11-13 ENCOUNTER — APPOINTMENT (OUTPATIENT)
Dept: CT IMAGING | Facility: HOSPITAL | Age: 26
End: 2021-11-13

## 2021-11-13 ENCOUNTER — ANESTHESIA (OUTPATIENT)
Dept: PERIOP | Facility: HOSPITAL | Age: 26
End: 2021-11-13

## 2021-11-13 ENCOUNTER — HOSPITAL ENCOUNTER (OUTPATIENT)
Facility: HOSPITAL | Age: 26
Discharge: HOME | End: 2021-11-14
Attending: EMERGENCY MEDICINE | Admitting: UROLOGY

## 2021-11-13 DIAGNOSIS — N20.0 KIDNEY STONES: ICD-10-CM

## 2021-11-13 DIAGNOSIS — N20.1 URETERAL CALCULI: ICD-10-CM

## 2021-11-13 DIAGNOSIS — N12 PYELONEPHRITIS OF LEFT KIDNEY: ICD-10-CM

## 2021-11-13 DIAGNOSIS — N20.1 URETERAL CALCULI: Primary | ICD-10-CM

## 2021-11-13 PROBLEM — N23 RENAL COLIC ON LEFT SIDE: Status: RESOLVED | Noted: 2021-11-13 | Resolved: 2021-11-13

## 2021-11-13 PROBLEM — N23 RENAL COLIC ON LEFT SIDE: Status: ACTIVE | Noted: 2021-11-13

## 2021-11-13 PROBLEM — N13.2 HYDRONEPHROSIS WITH URETERAL CALCULUS: Status: ACTIVE | Noted: 2021-11-13

## 2021-11-13 PROBLEM — N13.2 HYDRONEPHROSIS WITH URETERAL CALCULUS: Status: RESOLVED | Noted: 2021-11-13 | Resolved: 2021-11-13

## 2021-11-13 LAB
ALBUMIN SERPL-MCNC: 4 G/DL (ref 3.5–5.2)
ALBUMIN SERPL-MCNC: 4.2 G/DL (ref 3.5–5.2)
ALBUMIN/GLOB SERPL: 1.4 G/DL
ALBUMIN/GLOB SERPL: 1.6 G/DL
ALP SERPL-CCNC: 84 U/L (ref 39–117)
ALP SERPL-CCNC: 96 U/L (ref 39–117)
ALT SERPL W P-5'-P-CCNC: 18 U/L (ref 1–33)
ALT SERPL W P-5'-P-CCNC: 19 U/L (ref 1–33)
ANION GAP SERPL CALCULATED.3IONS-SCNC: 11.2 MMOL/L (ref 5–15)
ANION GAP SERPL CALCULATED.3IONS-SCNC: 9.5 MMOL/L (ref 5–15)
AST SERPL-CCNC: 16 U/L (ref 1–32)
AST SERPL-CCNC: 18 U/L (ref 1–32)
BACTERIA SPEC AEROBE CULT: NORMAL
BACTERIA UR QL AUTO: ABNORMAL /HPF
BASOPHILS # BLD AUTO: 0.07 10*3/MM3 (ref 0–0.2)
BASOPHILS NFR BLD AUTO: 0.7 % (ref 0–1.5)
BILIRUB SERPL-MCNC: 0.2 MG/DL (ref 0–1.2)
BILIRUB SERPL-MCNC: 0.4 MG/DL (ref 0–1.2)
BILIRUB UR QL STRIP: NEGATIVE
BUN SERPL-MCNC: 11 MG/DL (ref 6–20)
BUN SERPL-MCNC: 9 MG/DL (ref 6–20)
BUN/CREAT SERPL: 10.1 (ref 7–25)
BUN/CREAT SERPL: 11.8 (ref 7–25)
CALCIUM SPEC-SCNC: 8.9 MG/DL (ref 8.6–10.5)
CALCIUM SPEC-SCNC: 9.6 MG/DL (ref 8.6–10.5)
CHLORIDE SERPL-SCNC: 103 MMOL/L (ref 98–107)
CHLORIDE SERPL-SCNC: 104 MMOL/L (ref 98–107)
CLARITY UR: ABNORMAL
CO2 SERPL-SCNC: 24.5 MMOL/L (ref 22–29)
CO2 SERPL-SCNC: 27.8 MMOL/L (ref 22–29)
COLOR UR: YELLOW
CREAT SERPL-MCNC: 0.89 MG/DL (ref 0.57–1)
CREAT SERPL-MCNC: 0.93 MG/DL (ref 0.57–1)
DEPRECATED RDW RBC AUTO: 40.9 FL (ref 37–54)
EOSINOPHIL # BLD AUTO: 0.17 10*3/MM3 (ref 0–0.4)
EOSINOPHIL NFR BLD AUTO: 1.8 % (ref 0.3–6.2)
ERYTHROCYTE [DISTWIDTH] IN BLOOD BY AUTOMATED COUNT: 12.4 % (ref 12.3–15.4)
GFR SERPL CREATININE-BSD FRML MDRD: 74 ML/MIN/1.73
GFR SERPL CREATININE-BSD FRML MDRD: 78 ML/MIN/1.73
GLOBULIN UR ELPH-MCNC: 2.7 GM/DL
GLOBULIN UR ELPH-MCNC: 2.8 GM/DL
GLUCOSE BLDC GLUCOMTR-MCNC: 79 MG/DL (ref 70–99)
GLUCOSE SERPL-MCNC: 118 MG/DL (ref 65–99)
GLUCOSE SERPL-MCNC: 122 MG/DL (ref 65–99)
GLUCOSE UR STRIP-MCNC: NEGATIVE MG/DL
HCG INTACT+B SERPL-ACNC: <0.5 MIU/ML
HCT VFR BLD AUTO: 42.6 % (ref 34–46.6)
HGB BLD-MCNC: 14.1 G/DL (ref 12–15.9)
HGB UR QL STRIP.AUTO: ABNORMAL
HOLD SPECIMEN: NORMAL
HOLD SPECIMEN: NORMAL
HYALINE CASTS UR QL AUTO: ABNORMAL /LPF
IMM GRANULOCYTES # BLD AUTO: 0.03 10*3/MM3 (ref 0–0.05)
IMM GRANULOCYTES NFR BLD AUTO: 0.3 % (ref 0–0.5)
KETONES UR QL STRIP: NEGATIVE
LEUKOCYTE ESTERASE UR QL STRIP.AUTO: ABNORMAL
LIPASE SERPL-CCNC: 34 U/L (ref 13–60)
LYMPHOCYTES # BLD AUTO: 3.52 10*3/MM3 (ref 0.7–3.1)
LYMPHOCYTES NFR BLD AUTO: 37.2 % (ref 19.6–45.3)
MCH RBC QN AUTO: 29.8 PG (ref 26.6–33)
MCHC RBC AUTO-ENTMCNC: 33.1 G/DL (ref 31.5–35.7)
MCV RBC AUTO: 90.1 FL (ref 79–97)
MONOCYTES # BLD AUTO: 0.63 10*3/MM3 (ref 0.1–0.9)
MONOCYTES NFR BLD AUTO: 6.7 % (ref 5–12)
NEUTROPHILS NFR BLD AUTO: 5.04 10*3/MM3 (ref 1.7–7)
NEUTROPHILS NFR BLD AUTO: 53.3 % (ref 42.7–76)
NITRITE UR QL STRIP: NEGATIVE
NRBC BLD AUTO-RTO: 0 /100 WBC (ref 0–0.2)
PH UR STRIP.AUTO: 6.5 [PH] (ref 5–8)
PLATELET # BLD AUTO: 253 10*3/MM3 (ref 140–450)
PMV BLD AUTO: 9.4 FL (ref 6–12)
POTASSIUM SERPL-SCNC: 3.8 MMOL/L (ref 3.5–5.2)
POTASSIUM SERPL-SCNC: 4.1 MMOL/L (ref 3.5–5.2)
PROT SERPL-MCNC: 6.8 G/DL (ref 6–8.5)
PROT SERPL-MCNC: 6.9 G/DL (ref 6–8.5)
PROT UR QL STRIP: NEGATIVE
RBC # BLD AUTO: 4.73 10*6/MM3 (ref 3.77–5.28)
RBC # UR: ABNORMAL /HPF
REF LAB TEST METHOD: ABNORMAL
SODIUM SERPL-SCNC: 138 MMOL/L (ref 136–145)
SODIUM SERPL-SCNC: 142 MMOL/L (ref 136–145)
SP GR UR STRIP: 1.01 (ref 1–1.03)
SQUAMOUS #/AREA URNS HPF: ABNORMAL /HPF
URATE SERPL-MCNC: 6.1 MG/DL (ref 2.4–5.7)
UROBILINOGEN UR QL STRIP: ABNORMAL
WBC # BLD AUTO: 9.46 10*3/MM3 (ref 3.4–10.8)
WBC UR QL AUTO: ABNORMAL /HPF
WHOLE BLOOD HOLD SPECIMEN: NORMAL
WHOLE BLOOD HOLD SPECIMEN: NORMAL

## 2021-11-13 PROCEDURE — 96365 THER/PROPH/DIAG IV INF INIT: CPT

## 2021-11-13 PROCEDURE — C1894 INTRO/SHEATH, NON-LASER: HCPCS | Performed by: UROLOGY

## 2021-11-13 PROCEDURE — 25010000002 HYDROMORPHONE 1 MG/ML SOLUTION: Performed by: NURSE ANESTHETIST, CERTIFIED REGISTERED

## 2021-11-13 PROCEDURE — G0378 HOSPITAL OBSERVATION PER HR: HCPCS

## 2021-11-13 PROCEDURE — 96361 HYDRATE IV INFUSION ADD-ON: CPT

## 2021-11-13 PROCEDURE — 25010000002 CEFTRIAXONE PER 250 MG: Performed by: UROLOGY

## 2021-11-13 PROCEDURE — 36415 COLL VENOUS BLD VENIPUNCTURE: CPT

## 2021-11-13 PROCEDURE — 25010000002 CEFTRIAXONE PER 250 MG: Performed by: NURSE PRACTITIONER

## 2021-11-13 PROCEDURE — 80053 COMPREHEN METABOLIC PANEL: CPT

## 2021-11-13 PROCEDURE — 96375 TX/PRO/DX INJ NEW DRUG ADDON: CPT

## 2021-11-13 PROCEDURE — 87086 URINE CULTURE/COLONY COUNT: CPT | Performed by: EMERGENCY MEDICINE

## 2021-11-13 PROCEDURE — 85025 COMPLETE CBC W/AUTO DIFF WBC: CPT

## 2021-11-13 PROCEDURE — 80053 COMPREHEN METABOLIC PANEL: CPT | Performed by: UROLOGY

## 2021-11-13 PROCEDURE — C1769 GUIDE WIRE: HCPCS | Performed by: UROLOGY

## 2021-11-13 PROCEDURE — 52356 CYSTO/URETERO W/LITHOTRIPSY: CPT | Performed by: UROLOGY

## 2021-11-13 PROCEDURE — 83690 ASSAY OF LIPASE: CPT

## 2021-11-13 PROCEDURE — 82962 GLUCOSE BLOOD TEST: CPT

## 2021-11-13 PROCEDURE — 25010000002 MORPHINE PER 10 MG: Performed by: UROLOGY

## 2021-11-13 PROCEDURE — 25010000002 ONDANSETRON PER 1 MG: Performed by: NURSE ANESTHETIST, CERTIFIED REGISTERED

## 2021-11-13 PROCEDURE — 25010000002 FENTANYL CITRATE (PF) 50 MCG/ML SOLUTION: Performed by: NURSE ANESTHETIST, CERTIFIED REGISTERED

## 2021-11-13 PROCEDURE — 25010000002 DEXAMETHASONE PER 1 MG: Performed by: NURSE ANESTHETIST, CERTIFIED REGISTERED

## 2021-11-13 PROCEDURE — 25010000002 ONDANSETRON PER 1 MG: Performed by: EMERGENCY MEDICINE

## 2021-11-13 PROCEDURE — 84550 ASSAY OF BLOOD/URIC ACID: CPT | Performed by: UROLOGY

## 2021-11-13 PROCEDURE — C1758 CATHETER, URETERAL: HCPCS | Performed by: UROLOGY

## 2021-11-13 PROCEDURE — 25010000002 KETOROLAC TROMETHAMINE PER 15 MG: Performed by: NURSE ANESTHETIST, CERTIFIED REGISTERED

## 2021-11-13 PROCEDURE — 74176 CT ABD & PELVIS W/O CONTRAST: CPT

## 2021-11-13 PROCEDURE — C2617 STENT, NON-COR, TEM W/O DEL: HCPCS | Performed by: UROLOGY

## 2021-11-13 PROCEDURE — 88300 SURGICAL PATH GROSS: CPT | Performed by: UROLOGY

## 2021-11-13 PROCEDURE — 74420 UROGRAPHY RTRGR +-KUB: CPT

## 2021-11-13 PROCEDURE — 81001 URINALYSIS AUTO W/SCOPE: CPT

## 2021-11-13 PROCEDURE — 25010000002 PROPOFOL 10 MG/ML EMULSION: Performed by: NURSE ANESTHETIST, CERTIFIED REGISTERED

## 2021-11-13 PROCEDURE — 25010000002 KETOROLAC TROMETHAMINE PER 15 MG: Performed by: EMERGENCY MEDICINE

## 2021-11-13 PROCEDURE — 84702 CHORIONIC GONADOTROPIN TEST: CPT

## 2021-11-13 PROCEDURE — 82365 CALCULUS SPECTROSCOPY: CPT | Performed by: UROLOGY

## 2021-11-13 DEVICE — STNT URETRL CLASSC DBL PIG 6F 28CM: Type: IMPLANTABLE DEVICE | Site: URETER | Status: FUNCTIONAL

## 2021-11-13 RX ORDER — ONDANSETRON 2 MG/ML
4 INJECTION INTRAMUSCULAR; INTRAVENOUS EVERY 6 HOURS PRN
Status: DISCONTINUED | OUTPATIENT
Start: 2021-11-13 | End: 2021-11-14 | Stop reason: HOSPADM

## 2021-11-13 RX ORDER — PROMETHAZINE HYDROCHLORIDE 25 MG/1
25 SUPPOSITORY RECTAL ONCE AS NEEDED
Status: DISCONTINUED | OUTPATIENT
Start: 2021-11-13 | End: 2021-11-13 | Stop reason: HOSPADM

## 2021-11-13 RX ORDER — SODIUM CHLORIDE 0.9 % (FLUSH) 0.9 %
10 SYRINGE (ML) INJECTION EVERY 12 HOURS SCHEDULED
Status: DISCONTINUED | OUTPATIENT
Start: 2021-11-13 | End: 2021-11-14 | Stop reason: HOSPADM

## 2021-11-13 RX ORDER — ACETAMINOPHEN 325 MG/1
650 TABLET ORAL EVERY 4 HOURS PRN
Status: DISCONTINUED | OUTPATIENT
Start: 2021-11-13 | End: 2021-11-14 | Stop reason: HOSPADM

## 2021-11-13 RX ORDER — NALOXONE HCL 0.4 MG/ML
0.4 VIAL (ML) INJECTION
Status: DISCONTINUED | OUTPATIENT
Start: 2021-11-13 | End: 2021-11-14 | Stop reason: HOSPADM

## 2021-11-13 RX ORDER — CEFTRIAXONE SODIUM 1 G/50ML
1 INJECTION, SOLUTION INTRAVENOUS ONCE
Status: COMPLETED | OUTPATIENT
Start: 2021-11-13 | End: 2021-11-13

## 2021-11-13 RX ORDER — MAGNESIUM HYDROXIDE 1200 MG/15ML
LIQUID ORAL AS NEEDED
Status: DISCONTINUED | OUTPATIENT
Start: 2021-11-13 | End: 2021-11-13 | Stop reason: HOSPADM

## 2021-11-13 RX ORDER — BISACODYL 10 MG
10 SUPPOSITORY, RECTAL RECTAL DAILY PRN
Status: DISCONTINUED | OUTPATIENT
Start: 2021-11-13 | End: 2021-11-14 | Stop reason: HOSPADM

## 2021-11-13 RX ORDER — AMOXICILLIN 250 MG
2 CAPSULE ORAL 2 TIMES DAILY
Status: DISCONTINUED | OUTPATIENT
Start: 2021-11-13 | End: 2021-11-14 | Stop reason: HOSPADM

## 2021-11-13 RX ORDER — ONDANSETRON 2 MG/ML
4 INJECTION INTRAMUSCULAR; INTRAVENOUS ONCE AS NEEDED
Status: DISCONTINUED | OUTPATIENT
Start: 2021-11-13 | End: 2021-11-13 | Stop reason: HOSPADM

## 2021-11-13 RX ORDER — OXYCODONE HYDROCHLORIDE 5 MG/1
5 TABLET ORAL
Status: DISCONTINUED | OUTPATIENT
Start: 2021-11-13 | End: 2021-11-13 | Stop reason: HOSPADM

## 2021-11-13 RX ORDER — SODIUM CHLORIDE 0.9 % (FLUSH) 0.9 %
10 SYRINGE (ML) INJECTION AS NEEDED
Status: DISCONTINUED | OUTPATIENT
Start: 2021-11-13 | End: 2021-11-13 | Stop reason: HOSPADM

## 2021-11-13 RX ORDER — ONDANSETRON 2 MG/ML
4 INJECTION INTRAMUSCULAR; INTRAVENOUS EVERY 6 HOURS PRN
Status: CANCELLED | OUTPATIENT
Start: 2021-11-13

## 2021-11-13 RX ORDER — FAMOTIDINE 10 MG/ML
20 INJECTION, SOLUTION INTRAVENOUS EVERY 12 HOURS SCHEDULED
Status: DISCONTINUED | OUTPATIENT
Start: 2021-11-13 | End: 2021-11-14 | Stop reason: HOSPADM

## 2021-11-13 RX ORDER — KETOROLAC TROMETHAMINE 30 MG/ML
30 INJECTION, SOLUTION INTRAMUSCULAR; INTRAVENOUS ONCE
Status: COMPLETED | OUTPATIENT
Start: 2021-11-13 | End: 2021-11-13

## 2021-11-13 RX ORDER — LIDOCAINE HYDROCHLORIDE 20 MG/ML
INJECTION, SOLUTION INFILTRATION; PERINEURAL AS NEEDED
Status: DISCONTINUED | OUTPATIENT
Start: 2021-11-13 | End: 2021-11-13 | Stop reason: SURG

## 2021-11-13 RX ORDER — PROMETHAZINE HYDROCHLORIDE 12.5 MG/1
25 TABLET ORAL ONCE AS NEEDED
Status: DISCONTINUED | OUTPATIENT
Start: 2021-11-13 | End: 2021-11-13 | Stop reason: HOSPADM

## 2021-11-13 RX ORDER — BISACODYL 5 MG/1
5 TABLET, DELAYED RELEASE ORAL DAILY PRN
Status: DISCONTINUED | OUTPATIENT
Start: 2021-11-13 | End: 2021-11-14 | Stop reason: HOSPADM

## 2021-11-13 RX ORDER — SODIUM CHLORIDE 0.9 % (FLUSH) 0.9 %
10 SYRINGE (ML) INJECTION AS NEEDED
Status: DISCONTINUED | OUTPATIENT
Start: 2021-11-13 | End: 2021-11-14 | Stop reason: HOSPADM

## 2021-11-13 RX ORDER — SODIUM CHLORIDE, SODIUM LACTATE, POTASSIUM CHLORIDE, CALCIUM CHLORIDE 600; 310; 30; 20 MG/100ML; MG/100ML; MG/100ML; MG/100ML
100 INJECTION, SOLUTION INTRAVENOUS CONTINUOUS
Status: DISCONTINUED | OUTPATIENT
Start: 2021-11-13 | End: 2021-11-14 | Stop reason: HOSPADM

## 2021-11-13 RX ORDER — MEPERIDINE HYDROCHLORIDE 25 MG/ML
12.5 INJECTION INTRAMUSCULAR; INTRAVENOUS; SUBCUTANEOUS
Status: DISCONTINUED | OUTPATIENT
Start: 2021-11-13 | End: 2021-11-13 | Stop reason: HOSPADM

## 2021-11-13 RX ORDER — ALUMINA, MAGNESIA, AND SIMETHICONE 2400; 2400; 240 MG/30ML; MG/30ML; MG/30ML
15 SUSPENSION ORAL EVERY 6 HOURS PRN
Status: DISCONTINUED | OUTPATIENT
Start: 2021-11-13 | End: 2021-11-14 | Stop reason: HOSPADM

## 2021-11-13 RX ORDER — DEXMEDETOMIDINE HYDROCHLORIDE 100 UG/ML
INJECTION, SOLUTION INTRAVENOUS AS NEEDED
Status: DISCONTINUED | OUTPATIENT
Start: 2021-11-13 | End: 2021-11-13

## 2021-11-13 RX ORDER — POLYETHYLENE GLYCOL 3350 17 G/17G
17 POWDER, FOR SOLUTION ORAL DAILY PRN
Status: DISCONTINUED | OUTPATIENT
Start: 2021-11-13 | End: 2021-11-14 | Stop reason: HOSPADM

## 2021-11-13 RX ORDER — HYDROXYZINE HYDROCHLORIDE 25 MG/1
25 TABLET, FILM COATED ORAL 3 TIMES DAILY PRN
Status: DISCONTINUED | OUTPATIENT
Start: 2021-11-13 | End: 2021-11-14 | Stop reason: HOSPADM

## 2021-11-13 RX ORDER — PROPOFOL 10 MG/ML
VIAL (ML) INTRAVENOUS AS NEEDED
Status: DISCONTINUED | OUTPATIENT
Start: 2021-11-13 | End: 2021-11-13 | Stop reason: SURG

## 2021-11-13 RX ORDER — HYDROCODONE BITARTRATE AND ACETAMINOPHEN 5; 325 MG/1; MG/1
1 TABLET ORAL EVERY 4 HOURS PRN
Status: DISCONTINUED | OUTPATIENT
Start: 2021-11-13 | End: 2021-11-14 | Stop reason: HOSPADM

## 2021-11-13 RX ORDER — KETOROLAC TROMETHAMINE 30 MG/ML
INJECTION, SOLUTION INTRAMUSCULAR; INTRAVENOUS AS NEEDED
Status: DISCONTINUED | OUTPATIENT
Start: 2021-11-13 | End: 2021-11-13 | Stop reason: SURG

## 2021-11-13 RX ORDER — ROCURONIUM BROMIDE 10 MG/ML
INJECTION, SOLUTION INTRAVENOUS AS NEEDED
Status: DISCONTINUED | OUTPATIENT
Start: 2021-11-13 | End: 2021-11-13 | Stop reason: SURG

## 2021-11-13 RX ORDER — ONDANSETRON 2 MG/ML
INJECTION INTRAMUSCULAR; INTRAVENOUS AS NEEDED
Status: DISCONTINUED | OUTPATIENT
Start: 2021-11-13 | End: 2021-11-13 | Stop reason: SURG

## 2021-11-13 RX ORDER — SODIUM CHLORIDE, SODIUM LACTATE, POTASSIUM CHLORIDE, CALCIUM CHLORIDE 600; 310; 30; 20 MG/100ML; MG/100ML; MG/100ML; MG/100ML
100 INJECTION, SOLUTION INTRAVENOUS CONTINUOUS
Status: CANCELLED | OUTPATIENT
Start: 2021-11-13

## 2021-11-13 RX ORDER — FENTANYL CITRATE 50 UG/ML
INJECTION, SOLUTION INTRAMUSCULAR; INTRAVENOUS AS NEEDED
Status: DISCONTINUED | OUTPATIENT
Start: 2021-11-13 | End: 2021-11-13 | Stop reason: SURG

## 2021-11-13 RX ORDER — SODIUM CHLORIDE 0.9 % (FLUSH) 0.9 %
10 SYRINGE (ML) INJECTION EVERY 12 HOURS SCHEDULED
Status: CANCELLED | OUTPATIENT
Start: 2021-11-13

## 2021-11-13 RX ORDER — BISOPROLOL FUMARATE 5 MG/1
2.5 TABLET, FILM COATED ORAL DAILY
Status: DISCONTINUED | OUTPATIENT
Start: 2021-11-13 | End: 2021-11-14 | Stop reason: HOSPADM

## 2021-11-13 RX ORDER — ONDANSETRON 2 MG/ML
4 INJECTION INTRAMUSCULAR; INTRAVENOUS EVERY 4 HOURS PRN
Status: DISCONTINUED | OUTPATIENT
Start: 2021-11-13 | End: 2021-11-14 | Stop reason: HOSPADM

## 2021-11-13 RX ORDER — SODIUM CHLORIDE, SODIUM LACTATE, POTASSIUM CHLORIDE, CALCIUM CHLORIDE 600; 310; 30; 20 MG/100ML; MG/100ML; MG/100ML; MG/100ML
100 INJECTION, SOLUTION INTRAVENOUS CONTINUOUS
Status: DISCONTINUED | OUTPATIENT
Start: 2021-11-13 | End: 2021-11-13

## 2021-11-13 RX ORDER — ONDANSETRON 2 MG/ML
4 INJECTION INTRAMUSCULAR; INTRAVENOUS ONCE
Status: COMPLETED | OUTPATIENT
Start: 2021-11-13 | End: 2021-11-13

## 2021-11-13 RX ORDER — SODIUM CHLORIDE 0.9 % (FLUSH) 0.9 %
10 SYRINGE (ML) INJECTION AS NEEDED
Status: CANCELLED | OUTPATIENT
Start: 2021-11-13

## 2021-11-13 RX ORDER — SODIUM CHLORIDE 0.9 % (FLUSH) 0.9 %
10 SYRINGE (ML) INJECTION EVERY 12 HOURS SCHEDULED
Status: DISCONTINUED | OUTPATIENT
Start: 2021-11-13 | End: 2021-11-13 | Stop reason: HOSPADM

## 2021-11-13 RX ORDER — HYDROCODONE BITARTRATE AND ACETAMINOPHEN 7.5; 325 MG/1; MG/1
1 TABLET ORAL ONCE
Status: COMPLETED | OUTPATIENT
Start: 2021-11-13 | End: 2021-11-13

## 2021-11-13 RX ORDER — CEFTRIAXONE SODIUM 1 G/50ML
1 INJECTION, SOLUTION INTRAVENOUS EVERY 24 HOURS
Status: DISCONTINUED | OUTPATIENT
Start: 2021-11-14 | End: 2021-11-14 | Stop reason: HOSPADM

## 2021-11-13 RX ORDER — CEFTRIAXONE SODIUM 1 G/50ML
1 INJECTION, SOLUTION INTRAVENOUS ONCE
Status: CANCELLED | OUTPATIENT
Start: 2021-11-13 | End: 2021-11-13

## 2021-11-13 RX ORDER — MORPHINE SULFATE 2 MG/ML
2 INJECTION, SOLUTION INTRAMUSCULAR; INTRAVENOUS
Status: DISCONTINUED | OUTPATIENT
Start: 2021-11-13 | End: 2021-11-14 | Stop reason: HOSPADM

## 2021-11-13 RX ORDER — DEXAMETHASONE SODIUM PHOSPHATE 4 MG/ML
INJECTION, SOLUTION INTRA-ARTICULAR; INTRALESIONAL; INTRAMUSCULAR; INTRAVENOUS; SOFT TISSUE AS NEEDED
Status: DISCONTINUED | OUTPATIENT
Start: 2021-11-13 | End: 2021-11-13 | Stop reason: SURG

## 2021-11-13 RX ADMIN — DOCUSATE SODIUM 50MG AND SENNOSIDES 8.6MG 2 TABLET: 8.6; 5 TABLET, FILM COATED ORAL at 20:39

## 2021-11-13 RX ADMIN — LIDOCAINE HYDROCHLORIDE 100 MG: 20 INJECTION, SOLUTION INFILTRATION; PERINEURAL at 12:00

## 2021-11-13 RX ADMIN — FAMOTIDINE 20 MG: 10 INJECTION INTRAVENOUS at 20:41

## 2021-11-13 RX ADMIN — SODIUM CHLORIDE 500 ML: 9 INJECTION, SOLUTION INTRAVENOUS at 01:17

## 2021-11-13 RX ADMIN — HYDROCODONE BITARTRATE AND ACETAMINOPHEN 1 TABLET: 7.5; 325 TABLET ORAL at 03:15

## 2021-11-13 RX ADMIN — FENTANYL CITRATE 100 MCG: 50 INJECTION, SOLUTION INTRAMUSCULAR; INTRAVENOUS at 11:58

## 2021-11-13 RX ADMIN — OXYCODONE HYDROCHLORIDE 5 MG: 5 TABLET ORAL at 13:26

## 2021-11-13 RX ADMIN — KETOROLAC TROMETHAMINE 30 MG: 30 INJECTION, SOLUTION INTRAMUSCULAR; INTRAVENOUS at 01:18

## 2021-11-13 RX ADMIN — ONDANSETRON 4 MG: 2 INJECTION INTRAMUSCULAR; INTRAVENOUS at 12:41

## 2021-11-13 RX ADMIN — ONDANSETRON 4 MG: 2 INJECTION INTRAMUSCULAR; INTRAVENOUS at 01:18

## 2021-11-13 RX ADMIN — DEXAMETHASONE SODIUM PHOSPHATE 4 MG: 4 INJECTION INTRA-ARTICULAR; INTRALESIONAL; INTRAMUSCULAR; INTRAVENOUS; SOFT TISSUE at 12:00

## 2021-11-13 RX ADMIN — KETOROLAC TROMETHAMINE 30 MG: 30 INJECTION, SOLUTION INTRAMUSCULAR; INTRAVENOUS at 12:08

## 2021-11-13 RX ADMIN — HYDROMORPHONE HYDROCHLORIDE 0.5 MG: 1 INJECTION, SOLUTION INTRAMUSCULAR; INTRAVENOUS; SUBCUTANEOUS at 13:23

## 2021-11-13 RX ADMIN — SODIUM CHLORIDE, PRESERVATIVE FREE 10 ML: 5 INJECTION INTRAVENOUS at 08:45

## 2021-11-13 RX ADMIN — SODIUM CHLORIDE, POTASSIUM CHLORIDE, SODIUM LACTATE AND CALCIUM CHLORIDE 100 ML/HR: 600; 310; 30; 20 INJECTION, SOLUTION INTRAVENOUS at 16:10

## 2021-11-13 RX ADMIN — PROPOFOL 50 MG: 10 INJECTION, EMULSION INTRAVENOUS at 12:02

## 2021-11-13 RX ADMIN — ROCURONIUM BROMIDE 40 MG: 10 INJECTION INTRAVENOUS at 12:03

## 2021-11-13 RX ADMIN — SODIUM CHLORIDE, POTASSIUM CHLORIDE, SODIUM LACTATE AND CALCIUM CHLORIDE 100 ML/HR: 600; 310; 30; 20 INJECTION, SOLUTION INTRAVENOUS at 03:55

## 2021-11-13 RX ADMIN — FAMOTIDINE 20 MG: 10 INJECTION INTRAVENOUS at 08:45

## 2021-11-13 RX ADMIN — HYDROCODONE BITARTRATE AND ACETAMINOPHEN 1 TABLET: 5; 325 TABLET ORAL at 22:06

## 2021-11-13 RX ADMIN — Medication: at 08:38

## 2021-11-13 RX ADMIN — ROCURONIUM BROMIDE 10 MG: 10 INJECTION INTRAVENOUS at 12:08

## 2021-11-13 RX ADMIN — PROPOFOL 40 MG: 10 INJECTION, EMULSION INTRAVENOUS at 12:49

## 2021-11-13 RX ADMIN — MORPHINE SULFATE 2 MG: 2 INJECTION, SOLUTION INTRAMUSCULAR; INTRAVENOUS at 16:10

## 2021-11-13 RX ADMIN — SUGAMMADEX 200 MG: 100 INJECTION, SOLUTION INTRAVENOUS at 12:49

## 2021-11-13 RX ADMIN — SODIUM CHLORIDE, PRESERVATIVE FREE 10 ML: 5 INJECTION INTRAVENOUS at 20:41

## 2021-11-13 RX ADMIN — PROPOFOL 200 MG: 10 INJECTION, EMULSION INTRAVENOUS at 12:00

## 2021-11-13 RX ADMIN — CEFTRIAXONE SODIUM 1 G: 1 INJECTION, SOLUTION INTRAVENOUS at 03:15

## 2021-11-13 RX ADMIN — CEFTRIAXONE SODIUM 1 G: 1 INJECTION, SOLUTION INTRAVENOUS at 11:29

## 2021-11-13 RX ADMIN — SODIUM CHLORIDE, POTASSIUM CHLORIDE, SODIUM LACTATE AND CALCIUM CHLORIDE: 600; 310; 30; 20 INJECTION, SOLUTION INTRAVENOUS at 12:42

## 2021-11-13 NOTE — PLAN OF CARE
Goal Outcome Evaluation:  Plan of Care Reviewed With: patient           Outcome Summary: New admit. Kidney stones--left flank pain, radiating to mid-lower stomach. Stent placement in AM. Shimon Daniels RN

## 2021-11-13 NOTE — ED PROVIDER NOTES
Subjective   The patient presents to the emergency department complaining of left lower abdominal pain that she states radiates across the bottom of her abdomen.  She states that this started abruptly at 11 PM.  She states for the last several days she has had some increase frequency and and some dysuria.  She states that she was recently on Keflex for a urinary tract infection but had been off of it for about a week.  She states that her symptoms had resolved but she shortly after she stopped taking it started experiencing dysuria and frequency again.  She states tonight she has had some nausea and vomited once.  She does have tenderness with palpation with no rebound or guarding.  She denies any recent fevers.  She reports no history of kidney stones.  She denies any significant back pain.          Review of Systems   Constitutional: Negative for chills and fever.   HENT: Negative for congestion, ear pain and sore throat.    Eyes: Negative for pain.   Respiratory: Negative for cough, chest tightness and shortness of breath.    Cardiovascular: Negative for chest pain.   Gastrointestinal: Positive for abdominal pain, nausea and vomiting. Negative for diarrhea.   Genitourinary: Positive for dysuria, frequency and urgency. Negative for flank pain and hematuria.   Musculoskeletal: Negative for joint swelling.   Skin: Negative for pallor.   Neurological: Negative for seizures and headaches.   All other systems reviewed and are negative.      Past Medical History:   Diagnosis Date   • Anxiety    • Asthma    • GERD (gastroesophageal reflux disease)    • Migraine    • PVC (premature ventricular contraction)        Allergies   Allergen Reactions   • Ciprofloxacin Rash   • Doxycycline Hyclate Rash   • Doxycycline Monohydrate Rash   • Latex Rash   • Sulfa Antibiotics Rash   • Sulfamethoxazole-Trimethoprim Rash and Hives   • Trimethoprim Rash       History reviewed. No pertinent surgical history.    History reviewed. No  pertinent family history.    Social History     Socioeconomic History   • Marital status: Single   Tobacco Use   • Smoking status: Never Smoker   • Smokeless tobacco: Never Used   • Tobacco comment: secondhand smoke exposure   Vaping Use   • Vaping Use: Never used   Substance and Sexual Activity   • Alcohol use: No   • Drug use: No   • Sexual activity: Defer           Objective   Physical Exam  Vitals and nursing note reviewed.   Constitutional:       General: She is not in acute distress.     Appearance: Normal appearance. She is not toxic-appearing.   HENT:      Head: Normocephalic and atraumatic.   Eyes:      General: No scleral icterus.  Cardiovascular:      Rate and Rhythm: Normal rate and regular rhythm.      Pulses: Normal pulses.   Pulmonary:      Effort: Pulmonary effort is normal. No respiratory distress.   Abdominal:      General: Abdomen is flat.      Palpations: Abdomen is soft.      Tenderness: There is abdominal tenderness in the suprapubic area and left lower quadrant. There is no guarding or rebound.   Musculoskeletal:         General: Normal range of motion.      Cervical back: Normal range of motion and neck supple.   Skin:     General: Skin is warm and dry.      Capillary Refill: Capillary refill takes less than 2 seconds.   Neurological:      General: No focal deficit present.      Mental Status: She is alert and oriented to person, place, and time. Mental status is at baseline.   Psychiatric:         Mood and Affect: Mood normal.         Procedures           ED Course  ED Course as of 11/13/21 0327   Sat Nov 13, 2021   0216 The patient reports that she is comfortable at this time.  We discussed her test results on her CT scan.  She advised that when I speak to Dr. Sanders if he feels like she needs to stay the she will agree to be admitted. [TC]   0235 Monocyte Rel %: 6.7 [TC]   0257 I spoke with Dr. Sanders and he would like for the hospitalist to admit the patient.  He states that she needs to be  started on some Rocephin kept n.p.o. and he will stent her in the morning.  The patient is made aware of the admission. [TC]   2894 I spoke with Dr. PAVEL Freeman and he will admit the patient.  He was advised to keep her n.p.o. and that Dr. Sanders would see her in the morning.  He was also advised that she was being given Rocephin in the emergency department.  The patient was made aware of the admission. [TC]      ED Course User Index  [TC] Shantelle Jones APRN                                           MDM  Number of Diagnoses or Management Options  Kidney stones: new and requires workup  Pyelonephritis of left kidney: new and requires workup     Amount and/or Complexity of Data Reviewed  Clinical lab tests: reviewed  Tests in the radiology section of CPT®: reviewed    Risk of Complications, Morbidity, and/or Mortality  Presenting problems: low  Diagnostic procedures: low  Management options: low    Patient Progress  Patient progress: stable      Final diagnoses:   Kidney stones   Pyelonephritis of left kidney       ED Disposition  ED Disposition     ED Disposition Condition Comment    Decision to Admit  Level of Care: Med/Surg [1]   Diagnosis: Kidney stones [199100]   Admitting Physician: JACOB FREEMAN [[id number]]   Attending Physician: JACOB FREEMAN [[id number]]            No follow-up provider specified.       Medication List      No changes were made to your prescriptions during this visit.          Shantelle Jones APRN  11/13/21 2437

## 2021-11-13 NOTE — CONSULTS
[facility]   Urology Consult Note    Patient Name: Kinsey Suresh  : 1995  MRN: [id number]  Primary Care Physician:  Paulina Gale APRN  Date of admission: 2021    Subjective   Subjective     Chief Complaint: Left ureteral calculi    History of UTI    HPI:    Kinsey Suresh is a 26 y.o. female started having severe pain in the left flank and left lower quadrant and on CT scan she has 2 stones in the left ureter larger one is 7 mm at the pelvic inlet and the smaller one is 4 mm at the left UV junction.  She had history of kidney stone before which she passed.  She has history of renal calculi in the family.  History of urine tract infection 2 weeks ago and was treated with antibiotics.    Review of Systems    Please see past medical and surgical history.  Rest of the systems are negative    Personal History     Past Medical History:   Diagnosis Date   • Anxiety    • Asthma    • GERD (gastroesophageal reflux disease)    • Migraine    • PVC (premature ventricular contraction)        Past Surgical History:   Procedure Laterality Date   • COLONOSCOPY     • ENDOSCOPY         Family History: family history is not on file. Otherwise pertinent FHx was reviewed and not pertinent to current issue.    Social History:  reports that she has never smoked. She has never used smokeless tobacco. She reports that she does not drink alcohol and does not use drugs.  Patient is not  and does not have any children.  She works at [employer] in [facility] [employer] Medications:  albuterol, azithromycin, bisoprolol, calcium carbonate EX, diclofenac, fluticasone, hydrOXYzine, ketorolac, nitrofurantoin (macrocrystal-monohydrate), omeprazole, ondansetron ODT, and phenazopyridine      Allergies:  Allergies   Allergen Reactions   • Ciprofloxacin Rash   • Doxycycline Hyclate Rash   • Doxycycline Monohydrate Rash   • Latex Rash   • Sulfa Antibiotics Rash   • Sulfamethoxazole-Trimethoprim Rash and Hives    • Trimethoprim Rash       Objective   Objective     Vitals:   Temp:  [97.9 °F (36.6 °C)-98.1 °F (36.7 °C)] 97.9 °F (36.6 °C)  Heart Rate:  [57-70] 57  Resp:  [18] 18  BP: ()/(55-74) 108/63  Physical Exam     Physical Exam  Constitutional:       General: She is in acute distress.      Appearance: She is obese. She is not ill-appearing, toxic-appearing or diaphoretic.   HENT:      Head: Normocephalic and atraumatic.   Eyes:      Pupils: Pupils are equal, round, and reactive to light.   Cardiovascular:      Rate and Rhythm: Normal rate and regular rhythm.      Pulses: Normal pulses.      Heart sounds: Normal heart sounds. No murmur heard.      Pulmonary:      Effort: Pulmonary effort is normal. No respiratory distress.      Breath sounds: Normal breath sounds. No rhonchi or rales.   Abdominal:      Palpations: There is no mass.      Tenderness: There is abdominal tenderness. There is no right CVA tenderness or left CVA tenderness.      Comments: Tenderness left lower quadrant   Musculoskeletal:         General: No swelling. Normal range of motion.      Cervical back: Normal range of motion and neck supple. No rigidity or tenderness.   Lymphadenopathy:      Cervical: No cervical adenopathy.   Skin:     General: Skin is warm.      Coloration: Skin is not jaundiced.   Neurological:      General: No focal deficit present.      Mental Status: She is alert and oriented to person, place, and time.      Motor: No weakness.      Gait: Gait normal.   Psychiatric:         Mood and Affect: Mood normal.         Behavior: Behavior normal.         Thought Content: Thought content normal.         Judgment: Judgment normal.         Result Review    Result Review:  I have personally reviewed the results from the time of this admission to 11/13/2021 08:07 EST and agree with these findings:  []  Laboratory  []  Microbiology  [x]  Radiology  []  EKG/Telemetry   []  Cardiology/Vascular   []  Pathology  []  Old records  []   Other:  Most notable findings include: Left ureteral calculi one of them is very large 7 mm and the other one is 4 mm.  Has history of urinary tract infection recently    Assessment/Plan   Assessment / Plan     Brief Patient Summary:  Kinsey Suresh is a 26 y.o. female who has obstructive uropathy from 2 stones in the left ureter with history of UTI    Active Hospital Problems:  Active Hospital Problems    Diagnosis    • Kidney stones    • Ureteral calculi    • Renal colic on left side        Plan: I am going to go ahead with cystoscopy left retrograde left ureteroscopy holmium lithotripsy with stent insertion.  Risk benefits and alternate treatment has been discussed especially infection bleeding and injury to urethra urinary bladder left ureter and kidney.  Because of her recent urinary tract infection, we need to remove the stones so she does not get septic.  Patient understands and will proceed today      DVT prophylaxis:  Mechanical DVT prophylaxis orders are present.    CODE STATUS:    Code Status (Patient has no pulse and is not breathing): CPR (Attempt to Resuscitate)  Medical Interventions (Patient has pulse or is breathing): Full Support        Electronically signed by Colt Sanders MD, 11/13/21, 7:59 AM EST.

## 2021-11-13 NOTE — PLAN OF CARE
Goal Outcome Evaluation:  Plan of Care Reviewed With: patient    Patient had a stent inserted today patient was complaining of pain post op even after medication was administered. Patient was able to tolerate voiding and ambulation post op.     Naomi Harkins RN          Progress: improving

## 2021-11-13 NOTE — OP NOTE
URETEROSCOPY LASER LITHOTRIPSY WITH STENT INSERTION  Procedure Report    Patient Name:  Kinsey Suresh  YOB: 1995    Date of Surgery:  11/13/2021     Indications: 2 stones in the left ureter with renal colic    Recent history of urinary tract infection    Pre-op Diagnosis:   Ureteral calculi [N20.1]       Post-Op Diagnosis Codes:     * Ureteral calculi [N20.1]           Procedure(s):  URETEROSCOPY LASER LITHOTRIPSY WITH STENT INSERTION STONE BASKET EXTRACTION    Staff:  Surgeon(s):  Colt Sanders MD         Anesthesia: General    Estimated Blood Loss: 0    Implants:    Implant Name Type Inv. Item Serial No.  Lot No. LRB No. Used Action   STNT URETRL CLASSC DBL PIG 6F 28CM - [id number] Stent STNT URETRL CLASSC DBL PIG 6F 28CM  [facility] FRITZ [id number] Left 1 Implanted       Specimen:          Specimens     ID Source Type Tests Collected By Collected At Frozen?    A Ureter, Left Calculus · TISSUE PATHOLOGY EXAM  · STONE ANALYSIS   Colt Sanders MD 11/13/21 1230 No    Description: left ureteral stone              Findings: 4 mm stone in the lower ureter and 7 mm stone at left mid ureter    Complications: No complications    Description of Procedure: The patient was done under general anesthetic and after successful induction patient was placed in a lithotomy position.  Thorough scrubbing her lower abdomen external genitalia was performed.  21 ACMI cystoscope was inserted in the urethra and urinary bladder was looked at.  Left ureteral orifice seems to be inflamed and edematous.  Right ureteral orifice is normal.  There was no bladder tumor present.  I inserted a 5 spiral ureteral catheter through the left UV junction and I could feel the great in the left ureter from the stone.  We advanced the catheter and around about 11 cm from UV junction there is obstruction from the second stone.  The catheter would not go beyond this and retrograde was done which showed a  filling defect in the left ureter.  I inserted a 0.38 Super Stiff guidewire through the left UV junction and this will not go beyond the second stone.  Then I tried a 0.38 Glidewire next to the guidewire and it took me a long time before the Glidewire went next to the stone into the left renal pelvis.  Once the Glidewire went up then I could insert the guidewire into the left renal pelvis.  We remove the Glidewire because is not easy to work with the Glidewire.  I dilated the lower ureter using 14 Kiswahili access sheath just in the lower ureter so I can insert 7.1 semirigid ureteroscope.  Now inserted 7.1 semirigid ureteroscope went through the urethra and the bladder and then in the left ureter.  The 4 mm stone went up and we secured it with 2.4 Fuller basket but it got crushed.  We took the stone out slowly in about 3 pieces.  Now went back with the ureteroscope and then I could see the big stone blocking the ureter completely.  I secured it with 2.4 Fuller basket but the stone was too large and would not go through the ureter.  I went ahead and used 272 holmium laser fiber the stone was broken through the basket and then it was small enough that we remove this big stone.  We went back and forth with the ureteroscope and rest of the fragments of the stone was removed.  I went up in the upper ureter I do not see any more fragments of stone so I went ahead with insertion of 28 cm 6 Kiswahili stent in the kidney and the urinary bladder and we have left 6 inches of thread which we talked in the vagina so we can pull it out in the office next week.  Cystoscopy was done to empty the urinary bladder.  She tolerated her procedure well sent to recovery room in good condition.           Colt Sanders MD     Date: 11/13/2021  Time: 12:57 EST

## 2021-11-13 NOTE — H&P
[facility]   HISTORY AND PHYSICAL    Patient Name: Kinsey Suresh  : 1995  MRN: [id number]  Primary Care Physician:  Paulina Gale APRN  Date of admission: 2021    Subjective   Subjective     Chief Complaint:   Abdominal pain    HPI:    Kinsey Suresh is a 26 y.o. female came to emergency room for left-sided abdominal pain starting yesterday.  Patient had kidney stones in the past which he has passed without difficulty, this time her pain was worse.  She did not had much blood in the urine.  There is no fever chills.  Patient admitted to medical service.  When I saw patient she is awake alert, no fever, some abdominal discomfort..  Waiting for surgical intervention, urologist on-call Dr. Sanders notified.    Review of Systems:    No fever chills, no chest pain no shortness of breath some abdominal discomfort.  No significant blood in the urine clots    Personal History     Past Medical History:   Diagnosis Date   • Anxiety    • Asthma    • GERD (gastroesophageal reflux disease)    • Migraine    • PVC (premature ventricular contraction)        Past Surgical History:   Procedure Laterality Date   • COLONOSCOPY     • ENDOSCOPY         Family History: family history is not on file. Otherwise pertinent FHx was reviewed and not pertinent to current issue.    Social History:  reports that she has never smoked. She has never used smokeless tobacco. She reports that she does not drink alcohol and does not use drugs.    Home Medications:  albuterol, bisoprolol, fluticasone, hydrOXYzine, and ondansetron ODT      Allergies:  Allergies   Allergen Reactions   • Ciprofloxacin Rash   • Doxycycline Hyclate Rash   • Doxycycline Monohydrate Rash   • Latex Rash   • Sulfa Antibiotics Rash   • Sulfamethoxazole-Trimethoprim Rash and Hives   • Trimethoprim Rash       Objective   Objective     Vitals:   Temp:  [97.9 °F (36.6 °C)-98.1 °F (36.7 °C)] 97.9 °F (36.6 °C)  Heart Rate:  [57-70] 57  Resp:  [18]  18  BP: ()/(55-74) 108/63    Physical Exam     Young female obese not in acute distress,  No icterus or pallor  Heart regular,  Lungs clear,   Abdomen soft, mild left flank and left lower quad tenderness.  No guarding no rebound.  Active bowel sounds heard.    Extremities without any edema        I have personally reviewed the results from the time of this admission to 11/13/2021 11:16 EST and agree with these findings:  [x]  Laboratory  [x]  Microbiology  [x]  Radiology  []  EKG/Telemetry   []  Cardiology/Vascular   []  Pathology  []  Old records  []  Other:    CBC:    WBC   Date Value Ref Range Status   11/13/2021 9.46 3.40 - 10.80 10*3/mm3 Final     RBC   Date Value Ref Range Status   11/13/2021 4.73 3.77 - 5.28 10*6/mm3 Final     Hemoglobin   Date Value Ref Range Status   11/13/2021 14.1 12.0 - 15.9 g/dL Final     Hematocrit   Date Value Ref Range Status   11/13/2021 42.6 34.0 - 46.6 % Final     MCV   Date Value Ref Range Status   11/13/2021 90.1 79.0 - 97.0 fL Final     MCH   Date Value Ref Range Status   11/13/2021 29.8 26.6 - 33.0 pg Final     MCHC   Date Value Ref Range Status   11/13/2021 33.1 31.5 - 35.7 g/dL Final     RDW   Date Value Ref Range Status   11/13/2021 12.4 12.3 - 15.4 % Final     RDW-SD   Date Value Ref Range Status   11/13/2021 40.9 37.0 - 54.0 fl Final     MPV   Date Value Ref Range Status   11/13/2021 9.4 6.0 - 12.0 fL Final     Platelets   Date Value Ref Range Status   11/13/2021 253 140 - 450 10*3/mm3 Final     Neutrophil %   Date Value Ref Range Status   11/13/2021 53.3 42.7 - 76.0 % Final     Lymphocyte %   Date Value Ref Range Status   11/13/2021 37.2 19.6 - 45.3 % Final     Monocyte %   Date Value Ref Range Status   11/13/2021 6.7 5.0 - 12.0 % Final     Eosinophil %   Date Value Ref Range Status   11/13/2021 1.8 0.3 - 6.2 % Final     Basophil %   Date Value Ref Range Status   11/13/2021 0.7 0.0 - 1.5 % Final     Immature Grans %   Date Value Ref Range Status   11/13/2021 0.3  0.0 - 0.5 % Final     Neutrophils, Absolute   Date Value Ref Range Status   11/13/2021 5.04 1.70 - 7.00 10*3/mm3 Final     Lymphocytes, Absolute   Date Value Ref Range Status   11/13/2021 3.52 (H) 0.70 - 3.10 10*3/mm3 Final     Monocytes, Absolute   Date Value Ref Range Status   11/13/2021 0.63 0.10 - 0.90 10*3/mm3 Final     Eosinophils, Absolute   Date Value Ref Range Status   11/13/2021 0.17 0.00 - 0.40 10*3/mm3 Final     Basophils, Absolute   Date Value Ref Range Status   11/13/2021 0.07 0.00 - 0.20 10*3/mm3 Final     Immature Grans, Absolute   Date Value Ref Range Status   11/13/2021 0.03 0.00 - 0.05 10*3/mm3 Final     nRBC   Date Value Ref Range Status   11/13/2021 0.0 0.0 - 0.2 /100 WBC Final        BMP:    Lab Results   Component Value Date    GLUCOSE 118 (H) 11/13/2021    BUN 11 11/13/2021    CREATININE 0.93 11/13/2021    EGFRIFNONA 74 11/13/2021    BCR 11.8 11/13/2021    K 3.8 11/13/2021    CO2 27.8 11/13/2021    CALCIUM 9.6 11/13/2021    ALBUMIN 4.20 11/13/2021    LABIL2 1.6 04/23/2021    AST 16 11/13/2021    ALT 18 11/13/2021        No radiology results for the last day           Assessment/Plan   Assessment / Plan       Current Diagnosis:  Active Hospital Problems    Diagnosis    • **Ureteral calculi    • Kidney stones    • Renal colic on left side    • Hydronephrosis with ureteral calculus      Plan:   Patient admitted to medical service, IV fluids, IV antibiotics, pain control.  Urology consult, discussed with Dr. Sanders for surgery today.  Recheck labs in a.m.      DVT prophylaxis:  Mechanical DVT prophylaxis orders are present.    GI Prophylaxis:    Pepcid    CODE STATUS:    Code Status (Patient has no pulse and is not breathing): CPR (Attempt to Resuscitate)  Medical Interventions (Patient has pulse or is breathing): Full Support    Admission Status:  I believe this patient meets observation status.             I have dictated this note utilizing Dragon Dictation.             Please note that  portions of this note were completed with a voice recognition program.             Part of this note may be an electronic transcription/translation of spoken language to printed text         using the Dragon Dictation System.       Electronically signed by Zander Freeman MD, 11/13/21, 9:12 AM EST.    Total time spent with in evaluation and management:

## 2021-11-13 NOTE — ANESTHESIA POSTPROCEDURE EVALUATION
Patient: Kinsey Suresh    Procedure Summary     Date: 11/13/21 Room / Location: [facility] OR 07 / [facility] MAIN OR    Anesthesia Start: 1157 Anesthesia Stop: 1300    Procedure: URETEROSCOPY LASER LITHOTRIPSY WITH STENT INSERTION STONE BASKET EXTRACTION (Left Ureter) Diagnosis:       Ureteral calculi      (Ureteral calculi [N20.1])    Surgeons: Colt Sanders MD Provider: Tre Albarran MD    Anesthesia Type: general ASA Status: 3 - Emergent          Anesthesia Type: general    Vitals  Vitals Value Taken Time   BP 92/63 11/13/21 1336   Temp 37.8 °C (100.1 °F) 11/13/21 1300   Pulse 88 11/13/21 1340   Resp 16 11/13/21 1330   SpO2 92 % 11/13/21 1340   Vitals shown include unvalidated device data.        Post Anesthesia Care and Evaluation    Patient location during evaluation: bedside  Patient participation: complete - patient participated  Level of consciousness: awake  Pain management: adequate  Airway patency: patent  Anesthetic complications: No anesthetic complications  PONV Status: none  Cardiovascular status: acceptable  Respiratory status: acceptable  Hydration status: acceptable    Comments: An Anesthesiologist personally participated in the most demanding procedures (including induction and emergence if applicable) in the anesthesia plan, monitored the course of anesthesia administration at frequent intervals and remained physically present and available for immediate diagnosis and treatment of emergencies.

## 2021-11-13 NOTE — ANESTHESIA PREPROCEDURE EVALUATION
Anesthesia Evaluation     Patient summary reviewed and Nursing notes reviewed                Airway   Mallampati: II  TM distance: >3 FB  Neck ROM: full  No difficulty expected  Dental          Pulmonary - normal exam    breath sounds clear to auscultation  (+) asthma,  Cardiovascular - negative cardio ROS and normal exam    Rhythm: regular        Neuro/Psych  (+) headaches, psychiatric history,     GI/Hepatic/Renal/Endo    (+) morbid obesity, GERD,  renal disease,     Musculoskeletal (-) negative ROS    Abdominal    Substance History - negative use     OB/GYN negative ob/gyn ROS         Other                      Anesthesia Plan    ASA 3 - emergent     general     intravenous induction     Anesthetic plan, all risks, benefits, and alternatives have been provided, discussed and informed consent has been obtained with: patient.

## 2021-11-14 ENCOUNTER — READMISSION MANAGEMENT (OUTPATIENT)
Dept: CALL CENTER | Facility: HOSPITAL | Age: 26
End: 2021-11-14

## 2021-11-14 VITALS
HEART RATE: 64 BPM | HEIGHT: 68 IN | WEIGHT: 259.26 LBS | SYSTOLIC BLOOD PRESSURE: 102 MMHG | DIASTOLIC BLOOD PRESSURE: 62 MMHG | BODY MASS INDEX: 39.29 KG/M2 | OXYGEN SATURATION: 99 % | TEMPERATURE: 98.2 F | RESPIRATION RATE: 18 BRPM

## 2021-11-14 LAB
ALBUMIN SERPL-MCNC: 3.8 G/DL (ref 3.5–5.2)
ALBUMIN/GLOB SERPL: 1.7 G/DL
ALP SERPL-CCNC: 71 U/L (ref 39–117)
ALT SERPL W P-5'-P-CCNC: 15 U/L (ref 1–33)
ANION GAP SERPL CALCULATED.3IONS-SCNC: 8.1 MMOL/L (ref 5–15)
AST SERPL-CCNC: 14 U/L (ref 1–32)
BASOPHILS # BLD AUTO: 0.05 10*3/MM3 (ref 0–0.2)
BASOPHILS NFR BLD AUTO: 0.5 % (ref 0–1.5)
BILIRUB SERPL-MCNC: 0.3 MG/DL (ref 0–1.2)
BUN SERPL-MCNC: 8 MG/DL (ref 6–20)
BUN/CREAT SERPL: 10.1 (ref 7–25)
CALCIUM SPEC-SCNC: 8.7 MG/DL (ref 8.6–10.5)
CHLORIDE SERPL-SCNC: 104 MMOL/L (ref 98–107)
CO2 SERPL-SCNC: 25.9 MMOL/L (ref 22–29)
CREAT SERPL-MCNC: 0.79 MG/DL (ref 0.57–1)
DEPRECATED RDW RBC AUTO: 41.2 FL (ref 37–54)
EOSINOPHIL # BLD AUTO: 0.02 10*3/MM3 (ref 0–0.4)
EOSINOPHIL NFR BLD AUTO: 0.2 % (ref 0.3–6.2)
ERYTHROCYTE [DISTWIDTH] IN BLOOD BY AUTOMATED COUNT: 12.5 % (ref 12.3–15.4)
GFR SERPL CREATININE-BSD FRML MDRD: 89 ML/MIN/1.73
GLOBULIN UR ELPH-MCNC: 2.3 GM/DL
GLUCOSE SERPL-MCNC: 122 MG/DL (ref 65–99)
HCT VFR BLD AUTO: 40.5 % (ref 34–46.6)
HGB BLD-MCNC: 13.4 G/DL (ref 12–15.9)
IMM GRANULOCYTES # BLD AUTO: 0.03 10*3/MM3 (ref 0–0.05)
IMM GRANULOCYTES NFR BLD AUTO: 0.3 % (ref 0–0.5)
LYMPHOCYTES # BLD AUTO: 1.78 10*3/MM3 (ref 0.7–3.1)
LYMPHOCYTES NFR BLD AUTO: 18.5 % (ref 19.6–45.3)
MCH RBC QN AUTO: 29.6 PG (ref 26.6–33)
MCHC RBC AUTO-ENTMCNC: 33.1 G/DL (ref 31.5–35.7)
MCV RBC AUTO: 89.6 FL (ref 79–97)
MONOCYTES # BLD AUTO: 0.54 10*3/MM3 (ref 0.1–0.9)
MONOCYTES NFR BLD AUTO: 5.6 % (ref 5–12)
NEUTROPHILS NFR BLD AUTO: 7.18 10*3/MM3 (ref 1.7–7)
NEUTROPHILS NFR BLD AUTO: 74.9 % (ref 42.7–76)
NRBC BLD AUTO-RTO: 0 /100 WBC (ref 0–0.2)
PLATELET # BLD AUTO: 227 10*3/MM3 (ref 140–450)
PMV BLD AUTO: 10 FL (ref 6–12)
POTASSIUM SERPL-SCNC: 4 MMOL/L (ref 3.5–5.2)
PROT SERPL-MCNC: 6.1 G/DL (ref 6–8.5)
RBC # BLD AUTO: 4.52 10*6/MM3 (ref 3.77–5.28)
SODIUM SERPL-SCNC: 138 MMOL/L (ref 136–145)
WBC # BLD AUTO: 9.6 10*3/MM3 (ref 3.4–10.8)

## 2021-11-14 PROCEDURE — 80053 COMPREHEN METABOLIC PANEL: CPT | Performed by: INTERNAL MEDICINE

## 2021-11-14 PROCEDURE — 25010000002 CEFTRIAXONE PER 250 MG: Performed by: UROLOGY

## 2021-11-14 PROCEDURE — 99224 PR SBSQ OBSERVATION CARE/DAY 15 MINUTES: CPT | Performed by: UROLOGY

## 2021-11-14 PROCEDURE — 85025 COMPLETE CBC W/AUTO DIFF WBC: CPT | Performed by: INTERNAL MEDICINE

## 2021-11-14 PROCEDURE — G0378 HOSPITAL OBSERVATION PER HR: HCPCS

## 2021-11-14 RX ORDER — ONDANSETRON 4 MG/1
4 TABLET, ORALLY DISINTEGRATING ORAL EVERY 8 HOURS PRN
Qty: 15 TABLET | Refills: 0 | OUTPATIENT
Start: 2021-11-14 | End: 2022-02-26

## 2021-11-14 RX ORDER — CEFUROXIME AXETIL 500 MG/1
500 TABLET ORAL 2 TIMES DAILY
Qty: 14 TABLET | Refills: 0 | Status: SHIPPED | OUTPATIENT
Start: 2021-11-14 | End: 2021-11-21

## 2021-11-14 RX ORDER — HYDROCODONE BITARTRATE AND ACETAMINOPHEN 5; 325 MG/1; MG/1
1 TABLET ORAL EVERY 6 HOURS PRN
Qty: 50 TABLET | Refills: 0 | Status: SHIPPED | OUTPATIENT
Start: 2021-11-14 | End: 2021-12-14

## 2021-11-14 RX ADMIN — SODIUM CHLORIDE, PRESERVATIVE FREE 10 ML: 5 INJECTION INTRAVENOUS at 08:05

## 2021-11-14 RX ADMIN — BISOPROLOL FUMARATE 2.5 MG: 5 TABLET, FILM COATED ORAL at 08:05

## 2021-11-14 RX ADMIN — HYDROCODONE BITARTRATE AND ACETAMINOPHEN 1 TABLET: 5; 325 TABLET ORAL at 02:05

## 2021-11-14 RX ADMIN — HYDROCODONE BITARTRATE AND ACETAMINOPHEN 1 TABLET: 5; 325 TABLET ORAL at 10:48

## 2021-11-14 RX ADMIN — DOCUSATE SODIUM 50MG AND SENNOSIDES 8.6MG 2 TABLET: 8.6; 5 TABLET, FILM COATED ORAL at 09:44

## 2021-11-14 RX ADMIN — FAMOTIDINE 20 MG: 10 INJECTION INTRAVENOUS at 08:04

## 2021-11-14 RX ADMIN — CEFTRIAXONE SODIUM 1 G: 1 INJECTION, SOLUTION INTRAVENOUS at 10:48

## 2021-11-14 NOTE — PLAN OF CARE
Goal Outcome Evaluation:  Plan of Care Reviewed With: patient           Outcome Summary: Pt will DC today. Taking PO pain medications. Shimon Daniels RN

## 2021-11-14 NOTE — OUTREACH NOTE
Prep Survey      Responses   [facility] [place] patient discharged from? [facility]   Is LACE score < 7 ? Yes   Emergency Room discharge w/ pulse ox? No   Eligibility Not Eligible   What are the reasons patient is not eligible? Other  [LACE <7, no a CM, and not acute covid (just hx of it in Sept)]   Does the patient have one of the following disease processes/diagnoses(primary or secondary)? Other   Prep survey completed? Yes          Maddison Bland RN

## 2021-11-14 NOTE — DISCHARGE SUMMARY
[facility]         DISCHARGE SUMMARY    Patient Name: Kinsey Suresh  : 1995  MRN: [id number]    Date of Admission: 2021  Date of Discharge:   Primary Care Physician: Paulina Gale APRN    Consults       Date and Time Order Name Status Description    2021  2:57 AM Inpatient Hospitalist Consult      2021  2:49 AM IP General Consult (Use specialty-specific consult if known)              Presenting Problem:   Kidney stones [N20.0]  Pyelonephritis of left kidney [N12]    Active and Resolved Hospital Problems:  Active Hospital Problems   No active problems to display.      Resolved Hospital Problems    Diagnosis POA    **Ureteral calculi [N20.1] Yes    Kidney stones [N20.0] Yes    Renal colic on left side [N23] Yes    Hydronephrosis with ureteral calculus [N13.2] Yes         Hospital Course     Hospital Course:  Kinsey Suresh is a 26 y.o. female admitted to hospital for abdominal pain, patient has 2 kidney stone on the left side with hydronephrosis.  Patient was admitted to medical service per request from urologist.  She was started on antibiotics n.p.o. status was maintained, urologist Dr. Sanders saw patient.  Patient was taken to the OR for cystoscopy with lithotripsy and insertion of stent on left side.  Patient tolerated procedure very well.  Dr. Sanders recommended observing 1 more day yesterday.  She was kept in hospital till today she is feeling much better.  No fever, no abdominal pain, no nausea vomiting.  She is feeling better she will be discharged home today with oral antibiotics and pain meds        DISCHARGE Follow Up Recommendations for labs and diagnostics:   Discharge to home.  Diet will be as tolerated.  Follow-up with Dr. Sanders in 3 days.  Follow-up with PCP as recommended      Day of Discharge     Vital Signs:  Temp:  [97.7 °F (36.5 °C)-100.1 °F (37.8 °C)] 98.1 °F (36.7 °C)  Heart Rate:  [56-99] 72  Resp:  [14-18] 18  BP:  ()/(44-75) 108/66  Flow (L/min):  [6] 6    Physical Exam:  Young obese female not in acute distress.  Heart regular  Lungs clear  Abdomen soft and obese, nontender      Pertinent  and/or Most Recent Results     LAB RESULTS:      Lab 11/14/21  0424 11/13/21  0000   WBC 9.60 9.46   HEMOGLOBIN 13.4 14.1   HEMATOCRIT 40.5 42.6   PLATELETS 227 253   NEUTROS ABS 7.18* 5.04   IMMATURE GRANS (ABS) 0.03 0.03   LYMPHS ABS 1.78 3.52*   MONOS ABS 0.54 0.63   EOS ABS 0.02 0.17   MCV 89.6 90.1         Lab 11/14/21  0424 11/13/21  1637 11/13/21  0000   SODIUM 138 138 142   POTASSIUM 4.0 4.1 3.8   CHLORIDE 104 104 103   CO2 25.9 24.5 27.8   ANION GAP 8.1 9.5 11.2   BUN 8 9 11   CREATININE 0.79 0.89 0.93   GLUCOSE 122* 122* 118*   CALCIUM 8.7 8.9 9.6         Lab 11/14/21  0424 11/13/21  1637 11/13/21  0000   TOTAL PROTEIN 6.1 6.8 6.9   ALBUMIN 3.80 4.00 4.20   GLOBULIN 2.3 2.8 2.7   ALT (SGPT) 15 19 18   AST (SGOT) 14 18 16   BILIRUBIN 0.3 0.4 0.2   ALK PHOS 71 84 96   LIPASE  --   --  34                     Brief Urine Lab Results  (Last result in the past 365 days)        Color   Clarity   Blood   Leuk Est   Nitrite   Protein   CREAT   Urine HCG        11/13/21 0000 Yellow   Cloudy   Moderate (2+)   Large (3+)   Negative   Negative                 Microbiology Results (last 10 days)       Procedure Component Value - Date/Time    Urine Culture - Urine, Urine, Clean Catch [[id number]] Collected: 11/13/21 0000    Lab Status: Final result Specimen: Urine, Clean Catch Updated: 11/13/21 1400     Urine Culture 25,000 CFU/mL Mixed Nunu Isolated    Narrative:      Specimen contains mixed organisms of questionable pathogenicity which indicates contamination with commensal nunu.  Further identification is unlikely to provide clinically useful information.  Suggest recollection.                                Labs Pending at Discharge:  Pending Labs       Order Current Status    STONE ANALYSIS - Calculus, Ureter, Left Collected  (11/13/21 1230)    Tissue Pathology Exam Collected (11/13/21 1230)              Discharge Details        Discharge Medications        New Medications        Instructions Start Date   cefuroxime 500 MG tablet  Commonly known as: CEFTIN   500 mg, Oral, 2 Times Daily      HYDROcodone-acetaminophen 5-325 MG per tablet  Commonly known as: Norco   1 tablet, Oral, Every 6 Hours PRN             Changes to Medications        Instructions Start Date   ondansetron ODT 4 MG disintegrating tablet  Commonly known as: ZOFRAN-ODT  What changed: when to take this   4 mg, Sublingual, Every 8 Hours PRN             Continue These Medications        Instructions Start Date   albuterol 108 (90 Base) MCG/ACT inhaler  Commonly known as: PROAIR RESPICLICK   Inhalation, Every 4 Hours PRN      bisoprolol 5 MG tablet  Commonly known as: ZEBeta   2.5 mg, Oral, Daily      hydrOXYzine 25 MG tablet  Commonly known as: ATARAX   25 mg, Oral, 3 Times Daily PRN               Allergies   Allergen Reactions    Ciprofloxacin Rash    Doxycycline Hyclate Rash    Doxycycline Monohydrate Rash    Latex Rash    Sulfa Antibiotics Rash    Sulfamethoxazole-Trimethoprim Rash and Hives    Trimethoprim Rash         Discharge Disposition:    Home or Self Care    Diet:    Regular    Discharge Activity:     As tolerated      No future appointments.  Follow-up with PCP in 2 weeks, follow-up with urologist Dr. Sanders in 3 days       Time spent on Discharge including face to face service: 14 minutes.            I have dictated this note utilizing Dragon Dictation.             Please note that portions of this note were completed with a voice recognition program.             Part of this note may be an electronic transcription/translation of spoken language to printed text         using the Dragon Dictation System.       Electronically signed by Zander Freeman MD, 11/14/21, 10:55 AM EST.

## 2021-11-14 NOTE — PROGRESS NOTES
[facility]     Progress Note    Patient Name: Kinsey Suresh  : 1995  MRN: [id number]  Primary Care Physician:  Paulina Gale APRN  Date of admission: 2021    Subjective   Subjective     Chief Complaint: Left ureteral calculi.    Post left ureteroscopy stone extraction and stent    HPI:  Patient Reports patient is doing much better except some pain when she urinates.  no nausea vomiting    Review of Systems    No change from yesterday    Objective   Objective     Vitals:   Temp:  [97.7 °F (36.5 °C)-100.1 °F (37.8 °C)] 98 °F (36.7 °C)  Heart Rate:  [56-99] 66  Resp:  [14-18] 18  BP: ()/(46-75) 101/46  Flow (L/min):  [6] 6    Physical Exam     26-year-old white female and is in no distress at this time.  No nausea vomiting except stent related pain.  On examination slight tenderness in the left lower quadrant but no rigidity or rebound tenderness  Result Review    Result Review:  I have personally reviewed the results from the time of this admission to 2021 06:24 EST and agree with these findings:  []  Laboratory  []  Microbiology  []  Radiology  []  EKG/Telemetry   []  Cardiology/Vascular   []  Pathology  []  Old records  []  Other:  Most notable findings include: Patient stones are out and she is comfortable    Assessment/Plan   Assessment / Plan     Brief Patient Summary:  Kinsey Suresh is a 26 y.o. female who had left ureteral calculi there was removed yesterday    Active Hospital Problems:  There are no active hospital problems to display for this patient.    Plan: Patient can be discharged at the discretion of the hospitalist and it all depends upon the urine culture       DVT prophylaxis:  Mechanical DVT prophylaxis orders are present.    CODE STATUS:   Code Status (Patient has no pulse and is not breathing): CPR (Attempt to Resuscitate)  Medical Interventions (Patient has pulse or is breathing): Full Support    Disposition:  I expect patient to be discharged at  the discretion of the hospitalist.    Electronically signed by Colt Sanders MD, 11/14/21, 6:24 AM EST.

## 2021-11-15 LAB
LAB AP CASE REPORT: NORMAL
LAB AP CLINICAL INFORMATION: NORMAL
PATH REPORT.FINAL DX SPEC: NORMAL
PATH REPORT.GROSS SPEC: NORMAL

## 2021-11-17 ENCOUNTER — OFFICE VISIT (OUTPATIENT)
Dept: UROLOGY | Facility: CLINIC | Age: 26
End: 2021-11-17

## 2021-11-17 VITALS
TEMPERATURE: 97.6 F | WEIGHT: 259 LBS | BODY MASS INDEX: 44.22 KG/M2 | SYSTOLIC BLOOD PRESSURE: 118 MMHG | HEIGHT: 64 IN | DIASTOLIC BLOOD PRESSURE: 70 MMHG | HEART RATE: 77 BPM

## 2021-11-17 DIAGNOSIS — Z96.0 RETAINED URETERAL STENT: Primary | ICD-10-CM

## 2021-11-17 DIAGNOSIS — Z09 POSTOP CHECK: ICD-10-CM

## 2021-11-17 LAB
BACTERIA UR QL AUTO: ABNORMAL /HPF
EPI CELLS #/AREA URNS HPF: 0 /[HPF]
RBC # UR STRIP: ABNORMAL /HPF
RENAL EPITHELIAL, POC: 0
UNIDENT CRYS URNS QL MICRO: ABNORMAL /HPF
WBC # UR STRIP: ABNORMAL /HPF

## 2021-11-17 PROCEDURE — 99212 OFFICE O/P EST SF 10 MIN: CPT | Performed by: UROLOGY

## 2021-11-17 NOTE — PROGRESS NOTES
"Chief Complaint  Post-op Follow-up    Retained ureteral stent    Subjective          Kinsey Suresh presents to [facility] UR[facility]  History of Present Illness    Patient had right ureteroscopy stone extraction.  Having problem with the stent with incomplete bladder emptying.    Objective   Vital Signs:   /70   Pulse 77   Temp 97.6 °F (36.4 °C)   Ht 162.6 cm (64\")   Wt 117 kg (259 lb)   BMI 44.46 kg/m²     Allergies   Allergen Reactions   • Ciprofloxacin Rash   • Doxycycline Hyclate Rash   • Doxycycline Monohydrate Rash   • Latex Rash   • Sulfa Antibiotics Rash   • Sulfamethoxazole-Trimethoprim Rash and Hives   • Trimethoprim Rash      Review of Systems no change from before.    Physical Exam    26-year-old white female is well    No abdominal tenderness  Result Review :                 Assessment and Plan    Diagnoses and all orders for this visit:    1. Retained ureteral stent (Primary)    2. Postop check    After discussing with the patient and her boyfriend I went ahead and remove the stent.  She had little discomfort after the stent removed.  We will recheck her in 1 month's time        Follow Up {Instructions Charge Capture  Follow-up Communications :23}  No follow-ups on file.  Patient was given instructions and counseling regarding her condition or for health maintenance advice. Please see specific information pulled into the AVS if appropriate.     Colt Sanders MD   "

## 2021-11-18 LAB
CALCIUM OXALATE DIHYDRATE MFR STONE IR: 40 %
COLOR STONE: NORMAL
COM MFR STONE: 55 %
COMPN STONE: NORMAL
HYDROXYAPATITE 24H ENGDIFF UR: 5 %
LABORATORY COMMENT REPORT: NORMAL
Lab: NORMAL
Lab: NORMAL
PHOTO: NORMAL
SIZE STONE: NORMAL MM
SPEC SOURCE SUBJ: NORMAL
WT STONE: 65 MG

## 2021-12-14 ENCOUNTER — OFFICE VISIT (OUTPATIENT)
Dept: UROLOGY | Facility: CLINIC | Age: 26
End: 2021-12-14

## 2021-12-14 VITALS
DIASTOLIC BLOOD PRESSURE: 67 MMHG | BODY MASS INDEX: 44.22 KG/M2 | HEIGHT: 64 IN | TEMPERATURE: 97.1 F | SYSTOLIC BLOOD PRESSURE: 119 MMHG | HEART RATE: 62 BPM | WEIGHT: 259 LBS

## 2021-12-14 DIAGNOSIS — N20.1 URETERAL STONE: ICD-10-CM

## 2021-12-14 DIAGNOSIS — R10.31 RIGHT LOWER QUADRANT PAIN: ICD-10-CM

## 2021-12-14 DIAGNOSIS — R31.0 GROSS HEMATURIA: Primary | ICD-10-CM

## 2021-12-14 DIAGNOSIS — R31.29 MICROSCOPIC HEMATURIA: ICD-10-CM

## 2021-12-14 LAB
BACTERIA UR QL AUTO: ABNORMAL /HPF
BILIRUB BLD-MCNC: NEGATIVE MG/DL
CLARITY, POC: ABNORMAL
COLOR UR: YELLOW
EPI CELLS #/AREA URNS HPF: 0 /[HPF]
EXPIRATION DATE: ABNORMAL
GLUCOSE UR STRIP-MCNC: NEGATIVE MG/DL
KETONES UR QL: NEGATIVE
LEUKOCYTE EST, POC: ABNORMAL
Lab: ABNORMAL
NITRITE UR-MCNC: NEGATIVE MG/ML
PH UR: 8.5 [PH] (ref 5–8)
PROT UR STRIP-MCNC: NEGATIVE MG/DL
RBC # UR STRIP: ABNORMAL /HPF
RBC # UR STRIP: ABNORMAL /UL
RENAL EPITHELIAL, POC: 0
SP GR UR: 1.02 (ref 1–1.03)
UNIDENT CRYS URNS QL MICRO: ABNORMAL /HPF
UROBILINOGEN UR QL: NORMAL
WBC # UR STRIP: ABNORMAL /HPF

## 2021-12-14 PROCEDURE — 99213 OFFICE O/P EST LOW 20 MIN: CPT | Performed by: UROLOGY

## 2021-12-14 PROCEDURE — 87086 URINE CULTURE/COLONY COUNT: CPT | Performed by: UROLOGY

## 2021-12-14 PROCEDURE — 87186 SC STD MICRODIL/AGAR DIL: CPT | Performed by: UROLOGY

## 2021-12-14 NOTE — PROGRESS NOTES
"Chief Complaint  Post-op left ureteroscopy and stone extraction    Gross hematuria and pain in the right lower quadrant    Subjective          Kinsey Suresh presents to [facility]  History of Present Illness    Patient had left ureteroscopy and holmium lithotripsy of left ureteral calculi.  Has been having pain in the right lower quadrant with gross hematuria.  No fever or chills.  History of renal calculi in the family and this is her third stone at this young age    Objective   Vital Signs:   /67   Pulse 62   Temp 97.1 °F (36.2 °C)   Ht 162.6 cm (64\")   Wt 117 kg (259 lb)   BMI 44.46 kg/m²     Allergies   Allergen Reactions   • Ciprofloxacin Rash   • Doxycycline Hyclate Rash   • Doxycycline Monohydrate Rash   • Latex Rash   • Sulfa Antibiotics Rash   • Sulfamethoxazole-Trimethoprim Rash and Hives   • Trimethoprim Rash      Review of Systems    Pain right lower quadrant gross hematuria rest of the system is negative    Physical Exam  Constitutional:       General: She is not in acute distress.     Appearance: She is obese. She is not ill-appearing or toxic-appearing.   HENT:      Head: Normocephalic and atraumatic.   Eyes:      Pupils: Pupils are equal, round, and reactive to light.   Cardiovascular:      Rate and Rhythm: Normal rate and regular rhythm.      Pulses: Normal pulses.      Heart sounds: Normal heart sounds. No murmur heard.      Pulmonary:      Effort: Pulmonary effort is normal.      Breath sounds: Normal breath sounds. No rhonchi or rales.   Abdominal:      General: Bowel sounds are normal.      Tenderness: There is abdominal tenderness. There is no right CVA tenderness or left CVA tenderness.      Comments: Right lower quadrant   Musculoskeletal:         General: No swelling. Normal range of motion.      Cervical back: Normal range of motion and neck supple. No rigidity or tenderness.   Lymphadenopathy:      Cervical: No cervical adenopathy.   Skin:     " General: Skin is warm.      Coloration: Skin is not jaundiced.   Neurological:      Mental Status: She is alert.      Motor: No weakness.      Gait: Gait normal.   Psychiatric:         Mood and Affect: Mood normal.         Behavior: Behavior normal.         Thought Content: Thought content normal.         Judgment: Judgment normal.        Result Review :                 Assessment and Plan    Diagnoses and all orders for this visit:    1. Right lower quadrant pain (Primary)    2. Ureteral stone  -     POC Urinalysis Dipstick, Automated  -     CT Abdomen Pelvis Stone Protocol; Future    3. Microscopic hematuria  -     Urine Culture - Urine, Urine, Clean Catch    4. Gross hematuria    Will do CT of abdomen pelvis just to make sure she does not have a stone on the right side.  Urine culture is done and will be seeing her after CT scan    Follow Up   No follow-ups on file.  Patient was given instructions and counseling regarding her condition or for health maintenance advice. Please see specific information pulled into the AVS if appropriate.     Colt Sanders MD

## 2021-12-19 LAB
BACTERIA UR CULT: ABNORMAL
OTHER ANTIBIOTIC SUSC ISLT: ABNORMAL

## 2021-12-29 ENCOUNTER — HOSPITAL ENCOUNTER (EMERGENCY)
Facility: HOSPITAL | Age: 26
Discharge: LEFT BEFORE DISCHARGE | End: 2021-12-29

## 2021-12-29 VITALS
HEIGHT: 64 IN | SYSTOLIC BLOOD PRESSURE: 96 MMHG | DIASTOLIC BLOOD PRESSURE: 77 MMHG | HEART RATE: 71 BPM | TEMPERATURE: 98 F | RESPIRATION RATE: 16 BRPM | OXYGEN SATURATION: 100 % | WEIGHT: 259.48 LBS | BODY MASS INDEX: 44.3 KG/M2

## 2021-12-29 LAB
ALBUMIN SERPL-MCNC: 4.5 G/DL (ref 3.5–5.2)
ALBUMIN/GLOB SERPL: 1.7 G/DL
ALP SERPL-CCNC: 102 U/L (ref 39–117)
ALT SERPL W P-5'-P-CCNC: 25 U/L (ref 1–33)
ANION GAP SERPL CALCULATED.3IONS-SCNC: 11.1 MMOL/L (ref 5–15)
AST SERPL-CCNC: 23 U/L (ref 1–32)
BACTERIA UR QL AUTO: ABNORMAL /HPF
BASOPHILS # BLD AUTO: 0.1 10*3/MM3 (ref 0–0.2)
BASOPHILS NFR BLD AUTO: 1.2 % (ref 0–1.5)
BILIRUB SERPL-MCNC: 0.4 MG/DL (ref 0–1.2)
BILIRUB UR QL STRIP: NEGATIVE
BUN SERPL-MCNC: 8 MG/DL (ref 6–20)
BUN/CREAT SERPL: 10 (ref 7–25)
CALCIUM SPEC-SCNC: 9.6 MG/DL (ref 8.6–10.5)
CHLORIDE SERPL-SCNC: 104 MMOL/L (ref 98–107)
CLARITY UR: CLEAR
CO2 SERPL-SCNC: 24.9 MMOL/L (ref 22–29)
COLOR UR: YELLOW
CREAT SERPL-MCNC: 0.8 MG/DL (ref 0.57–1)
DEPRECATED RDW RBC AUTO: 39.1 FL (ref 37–54)
EOSINOPHIL # BLD AUTO: 0.12 10*3/MM3 (ref 0–0.4)
EOSINOPHIL NFR BLD AUTO: 1.4 % (ref 0.3–6.2)
ERYTHROCYTE [DISTWIDTH] IN BLOOD BY AUTOMATED COUNT: 11.9 % (ref 12.3–15.4)
GFR SERPL CREATININE-BSD FRML MDRD: 88 ML/MIN/1.73
GLOBULIN UR ELPH-MCNC: 2.7 GM/DL
GLUCOSE SERPL-MCNC: 113 MG/DL (ref 65–99)
GLUCOSE UR STRIP-MCNC: NEGATIVE MG/DL
HCG INTACT+B SERPL-ACNC: <0.5 MIU/ML
HCT VFR BLD AUTO: 43.9 % (ref 34–46.6)
HGB BLD-MCNC: 15 G/DL (ref 12–15.9)
HGB UR QL STRIP.AUTO: NEGATIVE
HOLD SPECIMEN: NORMAL
HOLD SPECIMEN: NORMAL
HYALINE CASTS UR QL AUTO: ABNORMAL /LPF
IMM GRANULOCYTES # BLD AUTO: 0.02 10*3/MM3 (ref 0–0.05)
IMM GRANULOCYTES NFR BLD AUTO: 0.2 % (ref 0–0.5)
KETONES UR QL STRIP: NEGATIVE
LEUKOCYTE ESTERASE UR QL STRIP.AUTO: ABNORMAL
LIPASE SERPL-CCNC: 32 U/L (ref 13–60)
LYMPHOCYTES # BLD AUTO: 3.2 10*3/MM3 (ref 0.7–3.1)
LYMPHOCYTES NFR BLD AUTO: 38.4 % (ref 19.6–45.3)
MCH RBC QN AUTO: 30.7 PG (ref 26.6–33)
MCHC RBC AUTO-ENTMCNC: 34.2 G/DL (ref 31.5–35.7)
MCV RBC AUTO: 90 FL (ref 79–97)
MONOCYTES # BLD AUTO: 0.38 10*3/MM3 (ref 0.1–0.9)
MONOCYTES NFR BLD AUTO: 4.6 % (ref 5–12)
NEUTROPHILS NFR BLD AUTO: 4.51 10*3/MM3 (ref 1.7–7)
NEUTROPHILS NFR BLD AUTO: 54.2 % (ref 42.7–76)
NITRITE UR QL STRIP: NEGATIVE
NRBC BLD AUTO-RTO: 0 /100 WBC (ref 0–0.2)
PH UR STRIP.AUTO: 5.5 [PH] (ref 5–8)
PLATELET # BLD AUTO: 246 10*3/MM3 (ref 140–450)
PMV BLD AUTO: 10.3 FL (ref 6–12)
POTASSIUM SERPL-SCNC: 3.7 MMOL/L (ref 3.5–5.2)
PROT SERPL-MCNC: 7.2 G/DL (ref 6–8.5)
PROT UR QL STRIP: NEGATIVE
RBC # BLD AUTO: 4.88 10*6/MM3 (ref 3.77–5.28)
RBC # UR STRIP: ABNORMAL /HPF
REF LAB TEST METHOD: ABNORMAL
SODIUM SERPL-SCNC: 140 MMOL/L (ref 136–145)
SP GR UR STRIP: 1.01 (ref 1–1.03)
SQUAMOUS #/AREA URNS HPF: ABNORMAL /HPF
UROBILINOGEN UR QL STRIP: ABNORMAL
WBC # UR STRIP: ABNORMAL /HPF
WBC NRBC COR # BLD: 8.33 10*3/MM3 (ref 3.4–10.8)
WHOLE BLOOD HOLD SPECIMEN: NORMAL
WHOLE BLOOD HOLD SPECIMEN: NORMAL

## 2021-12-29 PROCEDURE — 84702 CHORIONIC GONADOTROPIN TEST: CPT

## 2021-12-29 PROCEDURE — 80053 COMPREHEN METABOLIC PANEL: CPT

## 2021-12-29 PROCEDURE — 81001 URINALYSIS AUTO W/SCOPE: CPT

## 2021-12-29 PROCEDURE — 99211 OFF/OP EST MAY X REQ PHY/QHP: CPT

## 2021-12-29 PROCEDURE — 85025 COMPLETE CBC W/AUTO DIFF WBC: CPT

## 2021-12-29 PROCEDURE — 83690 ASSAY OF LIPASE: CPT

## 2021-12-29 RX ORDER — SODIUM CHLORIDE 0.9 % (FLUSH) 0.9 %
10 SYRINGE (ML) INJECTION AS NEEDED
Status: DISCONTINUED | OUTPATIENT
Start: 2021-12-29 | End: 2021-12-30 | Stop reason: HOSPADM

## 2022-01-09 ENCOUNTER — HOSPITAL ENCOUNTER (EMERGENCY)
Facility: HOSPITAL | Age: 27
Discharge: HOME | End: 2022-01-09
Attending: EMERGENCY MEDICINE | Admitting: EMERGENCY MEDICINE

## 2022-01-09 VITALS
OXYGEN SATURATION: 96 % | HEART RATE: 96 BPM | BODY MASS INDEX: 44.53 KG/M2 | WEIGHT: 260.8 LBS | TEMPERATURE: 98.4 F | HEIGHT: 64 IN | SYSTOLIC BLOOD PRESSURE: 127 MMHG | DIASTOLIC BLOOD PRESSURE: 87 MMHG | RESPIRATION RATE: 18 BRPM

## 2022-01-09 DIAGNOSIS — L02.91 ABSCESS: Primary | ICD-10-CM

## 2022-01-09 PROCEDURE — 99282 EMERGENCY DEPT VISIT SF MDM: CPT

## 2022-01-09 RX ORDER — CLINDAMYCIN HYDROCHLORIDE 300 MG/1
300 CAPSULE ORAL 3 TIMES DAILY
Qty: 30 CAPSULE | Refills: 0 | Status: SHIPPED | OUTPATIENT
Start: 2022-01-09 | End: 2022-01-19

## 2022-01-09 NOTE — ED PROVIDER NOTES
Subjective   Patient presents today with a abscess on the left inner thigh x3 days.  Has a history of staph in ingrown hairs on her vagina and inner thighs.  Rates it as 7 out of 10 has not tried anything for the pain or any warm compresses.       History provided by:  Patient   used: No    Abscess  Location:  Leg  Leg abscess location:  L upper leg  Abscess quality: induration, painful and redness    Abscess quality: not draining, no fluctuance and not weeping    Duration:  3 days  Pain details:     Severity:  Moderate    Duration:  3 days  Relieved by:  Nothing  Worsened by:  Nothing  Ineffective treatments:  None tried      Review of Systems   Constitutional: Negative.    HENT: Negative.    Eyes: Negative.    Respiratory: Negative.    Cardiovascular: Negative.    Gastrointestinal: Negative.    Endocrine: Negative.    Genitourinary: Negative.    Musculoskeletal: Negative.    Skin: Negative.    Allergic/Immunologic: Negative.    Neurological: Negative.    Hematological: Negative.    Psychiatric/Behavioral: Negative.        Past Medical History:   Diagnosis Date   • Anxiety    • Asthma    • GERD (gastroesophageal reflux disease)    • Migraine    • PVC (premature ventricular contraction)        Allergies   Allergen Reactions   • Ciprofloxacin Rash   • Doxycycline Hyclate Rash   • Doxycycline Monohydrate Rash   • Latex Rash   • Sulfa Antibiotics Rash   • Sulfamethoxazole-Trimethoprim Rash and Hives   • Trimethoprim Rash       Past Surgical History:   Procedure Laterality Date   • COLONOSCOPY     • ENDOSCOPY     • URETEROSCOPY LASER LITHOTRIPSY WITH STENT INSERTION Left 11/13/2021    Procedure: URETEROSCOPY LASER LITHOTRIPSY WITH STENT INSERTION STONE BASKET EXTRACTION;  Surgeon: Colt Sanders MD;  Location: [facility];  Service: Urology;  Laterality: Left;       History reviewed. No pertinent family history.    Social History     Socioeconomic History   • Marital status: Single    Tobacco Use   • Smoking status: Never Smoker   • Smokeless tobacco: Never Used   • Tobacco comment: secondhand smoke exposure   Vaping Use   • Vaping Use: Never used   Substance and Sexual Activity   • Alcohol use: No   • Drug use: No   • Sexual activity: Defer           Objective   Physical Exam  Vitals and nursing note reviewed.   Constitutional:       Appearance: Normal appearance.   HENT:      Head: Normocephalic and atraumatic.      Nose: Nose normal.      Mouth/Throat:      Mouth: Mucous membranes are moist.   Cardiovascular:      Rate and Rhythm: Normal rate and regular rhythm.   Pulmonary:      Effort: Pulmonary effort is normal.      Breath sounds: Normal breath sounds.   Musculoskeletal:         General: Normal range of motion.      Cervical back: Normal range of motion and neck supple.      Comments: TTP, Erythematous and swelling noted on the inner L thigh. Indurated, non mobile 7 cm mass. No drainage noted.    Skin:     General: Skin is warm and dry.   Neurological:      General: No focal deficit present.      Mental Status: She is alert and oriented to person, place, and time.   Psychiatric:         Mood and Affect: Mood normal.         Behavior: Behavior normal.         Procedures           ED Course                                                 MDM  Number of Diagnoses or Management Options  Abscess  Diagnosis management comments: I have spoken with patient. I have explained the patient´s condition, diagnoses and treatment plan based on the information available to me at this time. I have answered the patient's questions and addressed any concerns. The patient has a good  understanding of the patient´s diagnosis, condition, and treatment plan as can be expected at this point. The vital signs have been stable. The patient´s condition is stable and appropriate for discharge from the emergency department.      The patient will pursue further outpatient evaluation with the primary care physician or  other designated or consulting physician as outlined in the discharge instructions. They are agreeable to this plan of care and follow-up instructions have been explained in detail. The patient has received these instructions in written format and have expressed an understanding of the discharge instructions. The patient is aware that any significant change in condition or worsening of symptoms should prompt an immediate return to this or the closest emergency department or call to 911.      Risk of Complications, Morbidity, and/or Mortality  Presenting problems: low  Diagnostic procedures: low  Management options: low    Patient Progress  Patient progress: stable      Final diagnoses:   Abscess       ED Disposition  ED Disposition     ED Disposition Condition Comment    Discharge Stable           Shahla Paulina Escamilla, APRN  [address]  [place] [address]  [phone]    Schedule an appointment as soon as possible for a visit   If symptoms worsen         Medication List      New Prescriptions    clindamycin 300 MG capsule  Commonly known as: CLEOCIN  Take 1 capsule by mouth 3 (Three) Times a Day for 10 days.           Where to Get Your Medications      These medications were sent to [facility]/pharmacy #[id number] - [place], [place] - [address] - [phone] [facility] [phone]   [address] [place] [address]    Hours: 24-hours Phone: [phone]   · clindamycin 300 MG capsule          Orlando Pizarro PA-C  01/09/22 1051       Orlando Pizarro PA-C  01/09/22 1103

## 2022-01-10 ENCOUNTER — TELEPHONE (OUTPATIENT)
Dept: UROLOGY | Facility: CLINIC | Age: 27
End: 2022-01-10

## 2022-01-10 NOTE — TELEPHONE ENCOUNTER
PT CALLED TO INQUIRE ABOUT MISSED CALL, HUB READ NOTE TO PT, PT HAS NOT SCHEDULED CT SCAN YET, HUB TRANSFERRED PT TO CENTRAL SCHEDULING TO GET THAT DONE.    PT STATES SHE'S FEELING FINE ALTHOUGH HER KIDNEYS HURT. SHE ALSO NOTED A BUMP ON HER LEG ON THE INNER THIGH AND BUMPS IN THE CROTCH AREA THAT BREAK OPEN AND BLEED. HUB STATED THEY WOULD ADVISE OFFICE OF HER RESPONSE.

## 2022-01-10 NOTE — TELEPHONE ENCOUNTER
HUB TO READ: DESIRAE STATED THAT THE BUMPS ARE NOT FROM WHAT HE HAS DONE AND TO SEE HER PRIMARY CARE PROVIDER TO SEE WHATS GOING ON.

## 2022-01-10 NOTE — TELEPHONE ENCOUNTER
HUB TO READ: CALLING TO SEE HOW PATENTS IS FEELING AND WANTING TO KNOW IF SHE HAD HER CT SCAN DONE.

## 2022-02-06 ENCOUNTER — APPOINTMENT (OUTPATIENT)
Dept: CT IMAGING | Facility: HOSPITAL | Age: 27
End: 2022-02-06

## 2022-02-06 ENCOUNTER — HOSPITAL ENCOUNTER (EMERGENCY)
Facility: HOSPITAL | Age: 27
Discharge: HOME | End: 2022-02-06
Attending: EMERGENCY MEDICINE | Admitting: EMERGENCY MEDICINE

## 2022-02-06 VITALS
SYSTOLIC BLOOD PRESSURE: 106 MMHG | DIASTOLIC BLOOD PRESSURE: 59 MMHG | HEIGHT: 64 IN | OXYGEN SATURATION: 98 % | TEMPERATURE: 98.1 F | HEART RATE: 63 BPM | BODY MASS INDEX: 44.34 KG/M2 | WEIGHT: 259.7 LBS | RESPIRATION RATE: 18 BRPM

## 2022-02-06 DIAGNOSIS — N39.0 ACUTE URINARY TRACT INFECTION: Primary | ICD-10-CM

## 2022-02-06 LAB
ALBUMIN SERPL-MCNC: 4 G/DL (ref 3.5–5.2)
ALBUMIN/GLOB SERPL: 1.6 G/DL
ALP SERPL-CCNC: 79 U/L (ref 39–117)
ALT SERPL W P-5'-P-CCNC: 29 U/L (ref 1–33)
ANION GAP SERPL CALCULATED.3IONS-SCNC: 9.4 MMOL/L (ref 5–15)
AST SERPL-CCNC: 22 U/L (ref 1–32)
BACTERIA UR QL AUTO: ABNORMAL /HPF
BASOPHILS # BLD AUTO: 0.07 10*3/MM3 (ref 0–0.2)
BASOPHILS NFR BLD AUTO: 0.8 % (ref 0–1.5)
BILIRUB SERPL-MCNC: 0.2 MG/DL (ref 0–1.2)
BILIRUB UR QL STRIP: NEGATIVE
BUN SERPL-MCNC: 8 MG/DL (ref 6–20)
BUN/CREAT SERPL: 9 (ref 7–25)
CALCIUM SPEC-SCNC: 9 MG/DL (ref 8.6–10.5)
CHLORIDE SERPL-SCNC: 105 MMOL/L (ref 98–107)
CLARITY UR: ABNORMAL
CO2 SERPL-SCNC: 25.6 MMOL/L (ref 22–29)
COLOR UR: ABNORMAL
CREAT SERPL-MCNC: 0.89 MG/DL (ref 0.57–1)
DEPRECATED RDW RBC AUTO: 39.3 FL (ref 37–54)
EOSINOPHIL # BLD AUTO: 0.17 10*3/MM3 (ref 0–0.4)
EOSINOPHIL NFR BLD AUTO: 2 % (ref 0.3–6.2)
ERYTHROCYTE [DISTWIDTH] IN BLOOD BY AUTOMATED COUNT: 12.2 % (ref 12.3–15.4)
GFR SERPL CREATININE-BSD FRML MDRD: 78 ML/MIN/1.73
GLOBULIN UR ELPH-MCNC: 2.5 GM/DL
GLUCOSE SERPL-MCNC: 96 MG/DL (ref 65–99)
GLUCOSE UR STRIP-MCNC: NEGATIVE MG/DL
HCG INTACT+B SERPL-ACNC: <0.5 MIU/ML
HCT VFR BLD AUTO: 44.3 % (ref 34–46.6)
HGB BLD-MCNC: 15.2 G/DL (ref 12–15.9)
HGB UR QL STRIP.AUTO: NEGATIVE
HOLD SPECIMEN: NORMAL
HOLD SPECIMEN: NORMAL
HYALINE CASTS UR QL AUTO: ABNORMAL /LPF
IMM GRANULOCYTES # BLD AUTO: 0.02 10*3/MM3 (ref 0–0.05)
IMM GRANULOCYTES NFR BLD AUTO: 0.2 % (ref 0–0.5)
KETONES UR QL STRIP: NEGATIVE
LEUKOCYTE ESTERASE UR QL STRIP.AUTO: ABNORMAL
LIPASE SERPL-CCNC: 29 U/L (ref 13–60)
LYMPHOCYTES # BLD AUTO: 3.27 10*3/MM3 (ref 0.7–3.1)
LYMPHOCYTES NFR BLD AUTO: 39 % (ref 19.6–45.3)
MCH RBC QN AUTO: 30.8 PG (ref 26.6–33)
MCHC RBC AUTO-ENTMCNC: 34.3 G/DL (ref 31.5–35.7)
MCV RBC AUTO: 89.9 FL (ref 79–97)
MONOCYTES # BLD AUTO: 0.55 10*3/MM3 (ref 0.1–0.9)
MONOCYTES NFR BLD AUTO: 6.6 % (ref 5–12)
NEUTROPHILS NFR BLD AUTO: 4.31 10*3/MM3 (ref 1.7–7)
NEUTROPHILS NFR BLD AUTO: 51.4 % (ref 42.7–76)
NITRITE UR QL STRIP: NEGATIVE
NRBC BLD AUTO-RTO: 0 /100 WBC (ref 0–0.2)
PH UR STRIP.AUTO: 6 [PH] (ref 5–8)
PLATELET # BLD AUTO: 228 10*3/MM3 (ref 140–450)
PMV BLD AUTO: 10.7 FL (ref 6–12)
POTASSIUM SERPL-SCNC: 3.8 MMOL/L (ref 3.5–5.2)
PROT SERPL-MCNC: 6.5 G/DL (ref 6–8.5)
PROT UR QL STRIP: ABNORMAL
RBC # BLD AUTO: 4.93 10*6/MM3 (ref 3.77–5.28)
RBC # UR STRIP: ABNORMAL /HPF
REF LAB TEST METHOD: ABNORMAL
SODIUM SERPL-SCNC: 140 MMOL/L (ref 136–145)
SP GR UR STRIP: 1.02 (ref 1–1.03)
SQUAMOUS #/AREA URNS HPF: ABNORMAL /HPF
UROBILINOGEN UR QL STRIP: ABNORMAL
WBC # UR STRIP: ABNORMAL /HPF
WBC NRBC COR # BLD: 8.39 10*3/MM3 (ref 3.4–10.8)
WHOLE BLOOD HOLD SPECIMEN: NORMAL
WHOLE BLOOD HOLD SPECIMEN: NORMAL

## 2022-02-06 PROCEDURE — 83690 ASSAY OF LIPASE: CPT

## 2022-02-06 PROCEDURE — 36415 COLL VENOUS BLD VENIPUNCTURE: CPT

## 2022-02-06 PROCEDURE — 84702 CHORIONIC GONADOTROPIN TEST: CPT

## 2022-02-06 PROCEDURE — 85025 COMPLETE CBC W/AUTO DIFF WBC: CPT

## 2022-02-06 PROCEDURE — 99283 EMERGENCY DEPT VISIT LOW MDM: CPT

## 2022-02-06 PROCEDURE — 81001 URINALYSIS AUTO W/SCOPE: CPT

## 2022-02-06 PROCEDURE — 80053 COMPREHEN METABOLIC PANEL: CPT

## 2022-02-06 PROCEDURE — 74176 CT ABD & PELVIS W/O CONTRAST: CPT

## 2022-02-06 RX ORDER — CEPHALEXIN 500 MG/1
500 CAPSULE ORAL 4 TIMES DAILY
Qty: 20 CAPSULE | Refills: 0 | Status: SHIPPED | OUTPATIENT
Start: 2022-02-06 | End: 2022-02-11

## 2022-02-06 RX ORDER — SODIUM CHLORIDE 0.9 % (FLUSH) 0.9 %
10 SYRINGE (ML) INJECTION AS NEEDED
Status: DISCONTINUED | OUTPATIENT
Start: 2022-02-06 | End: 2022-02-06 | Stop reason: HOSPADM

## 2022-02-06 NOTE — DISCHARGE INSTRUCTIONS
Drink plenty of fluids. Continue to take antibiotics. Return for worsening symptoms. Follow-up with your doctor in 2 days if no better.

## 2022-02-06 NOTE — ED PROVIDER NOTES
"Time: 10:08 EST  Arrived by: car   Chief Complaint: hematuria, back pain  History provided by: patient  History is limited by: N/A     History of Present Illness:  Patient is a 26 y.o. year old female that presents to the emergency department with HEMATURIA and BACK PAIN. Patient reports her urine was a dark colored and is unsure if it was blood. Back pain is located over bilateral lower back and began while in the ED.     She also complains of nausea, vomiting and diarrhea. She states she is not able to keep anything down. She states she feels dehydrated. She reports her diarrhea has been improving.     Patient states she tested positive for C-diff in the past week. She reports she was recently on antibiotics for a leg infection.       Blood in Urine  This is a new problem. Urine color: \"dark\" Associated symptoms include nausea and vomiting. Pertinent negatives include no abdominal pain, chills, dysuria or fever.   Back Pain  Pain location: bilateral lower back.  Radiates to:  Does not radiate  Pain severity:  Moderate  Chronicity:  New  Associated symptoms: no abdominal pain, no chest pain, no dysuria, no fever and no headaches            Patient Care Team  Primary Care Provider: Paulina Gale APRN    Past Medical History:     Allergies   Allergen Reactions   • Ciprofloxacin Rash   • Doxycycline Hyclate Rash   • Doxycycline Monohydrate Rash   • Latex Rash   • Sulfa Antibiotics Rash   • Sulfamethoxazole-Trimethoprim Rash and Hives   • Trimethoprim Rash     Past Medical History:   Diagnosis Date   • Anxiety    • Asthma    • Clostridium difficile diarrhea 01/30/2022   • GERD (gastroesophageal reflux disease)    • Kidney stones    • Migraine    • PVC (premature ventricular contraction)      Past Surgical History:   Procedure Laterality Date   • COLONOSCOPY     • ENDOSCOPY     • URETEROSCOPY LASER LITHOTRIPSY WITH STENT INSERTION Left 11/13/2021    Procedure: URETEROSCOPY LASER LITHOTRIPSY WITH STENT INSERTION " "STONE BASKET EXTRACTION;  Surgeon: Colt Sanders MD;  Location: [facility] MAIN OR;  Service: Urology;  Laterality: Left;     Family History   Problem Relation Age of Onset   • Heart failure Mother        Home Medications:  Prior to Admission medications    Medication Sig Start Date End Date Taking? Authorizing Provider   albuterol (PROAIR RESPICLICK) 108 (90 Base) MCG/ACT inhaler Inhale Every 4 (Four) Hours As Needed for Wheezing.   Yes Emergency, Nurse Bridger, RN   bisoprolol (ZEBeta) 5 MG tablet Take 2.5 mg by mouth Daily.   Yes Provider, MD Eric   ondansetron ODT (ZOFRAN-ODT) 4 MG disintegrating tablet Place 1 tablet under the tongue Every 8 (Eight) Hours As Needed for Nausea or Vomiting for up to 15 doses. 11/14/21   Zander Freeman MD   fluticasone (FLONASE) 50 MCG/ACT nasal spray 2 sprays into the nostril(s) as directed by provider Daily. 7/25/21 9/19/21  Rosio Mccoy APRN        Social History:   Social History     Tobacco Use   • Smoking status: Never Smoker   • Smokeless tobacco: Never Used   • Tobacco comment: secondhand smoke exposure   Vaping Use   • Vaping Use: Never used   Substance Use Topics   • Alcohol use: No   • Drug use: No         Review of Systems:  Review of Systems   Constitutional: Negative for chills and fever.   HENT: Negative for ear discharge.    Eyes: Negative for photophobia.   Respiratory: Negative for cough and shortness of breath.    Cardiovascular: Negative for chest pain.   Gastrointestinal: Positive for diarrhea, nausea and vomiting. Negative for abdominal pain.   Genitourinary: Positive for hematuria. Negative for dysuria.   Musculoskeletal: Positive for back pain. Negative for neck pain.   Skin: Negative for rash.   Neurological: Negative for headaches.        Physical Exam:  /59   Pulse 63   Temp 98.1 °F (36.7 °C) (Oral)   Resp 18   Ht 162.6 cm (64\")   Wt 118 kg (259 lb 11.2 oz)   SpO2 98%   Breastfeeding No   BMI 44.58 kg/m²     Physical " Exam  Vitals and nursing note reviewed.   Constitutional:       General: She is not in acute distress.     Appearance: She is obese.   HENT:      Head: Normocephalic and atraumatic.   Cardiovascular:      Rate and Rhythm: Normal rate and regular rhythm.      Heart sounds: No murmur heard.      Pulmonary:      Effort: No respiratory distress.      Breath sounds: Normal breath sounds.   Abdominal:      Palpations: Abdomen is soft.      Tenderness: There is no abdominal tenderness.   Musculoskeletal:      Cervical back: Neck supple.   Skin:     General: Skin is warm and dry.      Findings: No rash.   Neurological:      General: No focal deficit present.      Mental Status: She is alert and oriented to person, place, and time.                Medications in the Emergency Department:  Medications - No data to display     Labs  Lab Results (last 24 hours)     ** No results found for the last 24 hours. **           Imaging:    CT Abdomen Pelvis Without Contrast    Result Date: 2/6/2022  Narrative: PROCEDURE: CT ABDOMEN PELVIS WO CONTRAST  COMPARISON: [facility], CT, CT ABDOMEN PELVIS WO CONTRAST, 11/13/2021, 1:34.  INDICATIONS: back pain and hematuria  PROTOCOL:   Standard imaging protocol performed    RADIATION:   DLP: 1217.7 mGy*cm   Automated exposure control was utilized to minimize radiation dose.  TECHNIQUE: Axial images of the abdomen and pelvis without intravenous or oral contrast.  FINDINGS:  ABDOMEN: The lung bases are clear.  The unenhanced liver, spleen, pancreas and adrenal glands are normal.  The unenhanced kidneys are normal.  There are no inflammatory changes around the gallbladder.  PELVIS: No ureteral or urinary bladder calcifications are identified.  No evidence of bowel obstruction, perforation or abscess.  The appendix and terminal ileum are normal.  No CT evidence of colitis.  There is a 4 cm right ovarian cyst.  There is a moderate amount of stool in the colon and rectum.  The abdominal  aorta has a normal caliber.  IMPRESSION:   1. No evidence of urolithiasis or hydronephrosis.  2. 4 cm right ovarian cyst.   LYSSA BENJAMIN MD       Electronically Signed and Approved By: LYSSA BENJAMIN MD on 2/06/2022 at 11:09               No Radiology Exams Resulted Within Past 24 Hours    Procedures/EKG's:  Procedures      Progress                            Medical Decision Making:  MDM     Final diagnoses:   Acute urinary tract infection        Disposition:  ED Disposition     ED Disposition Condition Comment    Discharge Stable             Documentation assistance provided by Helen Raymond acting as scribe for Dr. Srikanth De La Rosa. Information recorded by the scribe was done at my direction and has been verified and validated by me.            Helen Raymond  02/06/22 1016       Srikanth De La Rosa DO  02/09/22 0007

## 2022-02-18 ENCOUNTER — OFFICE VISIT (OUTPATIENT)
Dept: UROLOGY | Facility: CLINIC | Age: 27
End: 2022-02-18

## 2022-02-18 VITALS
WEIGHT: 259 LBS | DIASTOLIC BLOOD PRESSURE: 68 MMHG | TEMPERATURE: 98.6 F | HEART RATE: 62 BPM | HEIGHT: 64 IN | BODY MASS INDEX: 44.22 KG/M2 | SYSTOLIC BLOOD PRESSURE: 110 MMHG

## 2022-02-18 DIAGNOSIS — N83.201 CYST OF RIGHT OVARY: ICD-10-CM

## 2022-02-18 DIAGNOSIS — A09 DIARRHEA OF INFECTIOUS ORIGIN: ICD-10-CM

## 2022-02-18 DIAGNOSIS — R30.0 DYSURIA: Primary | ICD-10-CM

## 2022-02-18 DIAGNOSIS — R31.29 MICROSCOPIC HEMATURIA: ICD-10-CM

## 2022-02-18 LAB
BACTERIA UR QL AUTO: NORMAL /HPF
BILIRUB BLD-MCNC: NEGATIVE MG/DL
CLARITY, POC: CLEAR
COLOR UR: YELLOW
EPI CELLS #/AREA URNS HPF: NORMAL /[HPF]
EXPIRATION DATE: ABNORMAL
GLUCOSE UR STRIP-MCNC: NEGATIVE MG/DL
KETONES UR QL: NEGATIVE
LEUKOCYTE EST, POC: ABNORMAL
Lab: ABNORMAL
NITRITE UR-MCNC: NEGATIVE MG/ML
PH UR: 7 [PH] (ref 5–8)
PROT UR STRIP-MCNC: NEGATIVE MG/DL
RBC # UR STRIP: ABNORMAL /UL
RBC # UR STRIP: NORMAL /HPF
RENAL EPITHELIAL, POC: 0
SP GR UR: 1.02 (ref 1–1.03)
UNIDENT CRYS URNS QL MICRO: NORMAL /HPF
UROBILINOGEN UR QL: NORMAL
WBC # UR STRIP: NORMAL /HPF

## 2022-02-18 PROCEDURE — 99213 OFFICE O/P EST LOW 20 MIN: CPT | Performed by: UROLOGY

## 2022-02-18 PROCEDURE — 87086 URINE CULTURE/COLONY COUNT: CPT | Performed by: UROLOGY

## 2022-02-18 RX ORDER — CALCIUM CARBONATE/VITAMIN D3 600MG-62.5
CAPSULE ORAL
COMMUNITY
End: 2022-09-10

## 2022-02-18 RX ORDER — DICYCLOMINE HYDROCHLORIDE 10 MG/1
CAPSULE ORAL
COMMUNITY
Start: 2022-01-30 | End: 2022-02-26

## 2022-02-18 RX ORDER — SACCHAROMYCES BOULARDII 250 MG
250 CAPSULE ORAL
COMMUNITY
Start: 2022-02-09 | End: 2022-02-18

## 2022-02-18 RX ORDER — METRONIDAZOLE 500 MG/1
TABLET ORAL
COMMUNITY
Start: 2022-02-09 | End: 2022-03-18

## 2022-02-18 NOTE — PROGRESS NOTES
"Chief Complaint  Right groin pain    Dysuria    Diarrhea    Dark-colored urine and patient thinks it might be blood.  Subjective          Kinsey Suresh presents to [facility] UROLOGY  History of Present Illness    Patient had kidney stone last year which was removed. Starting about a month ago she has been having pain which she attributed to kidney stone on the right side. She has C. difficile and was restarted on Flagyl but continues to have diarrhea. She has nausea in the morning. She has occasional dysuria. Patient was alarmed because the urine was so dark and was seen in emergency room on 2/6/2022 and at that time CAT scan was performed which revealed a right ovarian cyst and microscopy did not reveal any RBCs in the urine. She continues to have pain in the right groin and dysuria sometimes. She has no fever or chills but has headaches    Objective Patient is in no acute distress  Vital Signs:   /68   Pulse 62   Temp 98.6 °F (37 °C)   Ht 162.6 cm (64\")   Wt 117 kg (259 lb)   BMI 44.46 kg/m²     Allergies   Allergen Reactions   • Ciprofloxacin Rash   • Doxycycline Hyclate Rash   • Doxycycline Monohydrate Rash   • Latex Rash   • Sulfa Antibiotics Rash   • Sulfamethoxazole-Trimethoprim Rash and Hives   • Trimethoprim Rash      Past medical history:  has a past medical history of Anxiety, Asthma, Clostridium difficile diarrhea (01/30/2022), GERD (gastroesophageal reflux disease), Kidney stones, Migraine, Ovarian cyst, and PVC (premature ventricular contraction).   Past surgical history:  has a past surgical history that includes Colonoscopy; Esophagogastroduodenoscopy; and ureteroscopy laser lithotripsy with stent insertion (Left, 11/13/2021).  Personal history: family history includes Heart failure in her mother.  Social history:  reports that she has never smoked. She has never used smokeless tobacco. She reports that she does not drink alcohol and does not use drugs.    Review of " Systems    Please see my history of present illness and past medical surgical history. Rest of the system is negative    Physical Exam  Constitutional:       General: She is not in acute distress.     Appearance: Normal appearance. She is obese. She is not ill-appearing or toxic-appearing.   HENT:      Head: Normocephalic and atraumatic.      Ears:      Comments: Patient's hearing is normal  Eyes:      Pupils: Pupils are equal, round, and reactive to light.   Cardiovascular:      Rate and Rhythm: Normal rate and regular rhythm.      Pulses: Normal pulses.      Heart sounds: Normal heart sounds. No murmur heard.      Pulmonary:      Effort: Pulmonary effort is normal.      Breath sounds: Normal breath sounds. No rhonchi or rales.   Abdominal:      Palpations: Abdomen is soft.      Tenderness: There is abdominal tenderness. There is no right CVA tenderness or left CVA tenderness.      Comments: Generalized low tenderness in the lower abdomen both right and left quadrant and suprapubic area   Musculoskeletal:         General: No swelling. Normal range of motion.      Cervical back: Normal range of motion and neck supple. No rigidity or tenderness.   Lymphadenopathy:      Cervical: No cervical adenopathy.   Skin:     General: Skin is warm.      Coloration: Skin is not jaundiced.   Neurological:      General: No focal deficit present.      Mental Status: She is alert and oriented to person, place, and time.      Motor: No weakness.      Gait: Gait normal.   Psychiatric:         Mood and Affect: Mood normal.         Behavior: Behavior normal.         Thought Content: Thought content normal.         Judgment: Judgment normal.        Result Review :                 Assessment and Plan    Diagnoses and all orders for this visit:    1. Microscopic hematuria (Primary)  -     POC Urinalysis Dipstick, Automated    2. Dysuria    3. Cyst of right ovary    4. Diarrhea of infectious origin    Because of dysuria will do a urine  culture. I do not see any red cells under microscopy. CT scan done at the hospital did not show any renal or ureteral calculi. I think she might have dark urine because of diarrhea and is going to see Dr. Yip. I think she needs to be referred to a gynecologist to rule evaluate her right ovarian cyst because she is having pain in the right groin. Since she does not have any renal or ureteral calculi, I do not need to follow her but will be happy to see her if she has any problem with kidney stone like she did before.    Follow Up   No follow-ups on file.  Patient was given instructions and counseling regarding her condition or for health maintenance advice. Please see specific information pulled into the AVS if appropriate.     Colt Sanders MD

## 2022-02-20 LAB — BACTERIA SPEC AEROBE CULT: NORMAL

## 2022-02-26 ENCOUNTER — HOSPITAL ENCOUNTER (EMERGENCY)
Facility: HOSPITAL | Age: 27
Discharge: HOME | End: 2022-02-26
Attending: EMERGENCY MEDICINE | Admitting: EMERGENCY MEDICINE

## 2022-02-26 VITALS
TEMPERATURE: 98.1 F | WEIGHT: 251.77 LBS | DIASTOLIC BLOOD PRESSURE: 72 MMHG | BODY MASS INDEX: 42.98 KG/M2 | RESPIRATION RATE: 20 BRPM | SYSTOLIC BLOOD PRESSURE: 105 MMHG | HEIGHT: 64 IN | HEART RATE: 54 BPM | OXYGEN SATURATION: 95 %

## 2022-02-26 DIAGNOSIS — R19.7 DIARRHEA, UNSPECIFIED TYPE: ICD-10-CM

## 2022-02-26 DIAGNOSIS — R10.9 ABDOMINAL PAIN, UNSPECIFIED ABDOMINAL LOCATION: Primary | ICD-10-CM

## 2022-02-26 LAB
ALBUMIN SERPL-MCNC: 4.4 G/DL (ref 3.5–5.2)
ALBUMIN/GLOB SERPL: 1.8 G/DL
ALP SERPL-CCNC: 95 U/L (ref 39–117)
ALT SERPL W P-5'-P-CCNC: 22 U/L (ref 1–33)
ANION GAP SERPL CALCULATED.3IONS-SCNC: 12.7 MMOL/L (ref 5–15)
AST SERPL-CCNC: 22 U/L (ref 1–32)
BASOPHILS # BLD AUTO: 0.09 10*3/MM3 (ref 0–0.2)
BASOPHILS NFR BLD AUTO: 1.1 % (ref 0–1.5)
BILIRUB SERPL-MCNC: 0.2 MG/DL (ref 0–1.2)
BUN SERPL-MCNC: 11 MG/DL (ref 6–20)
BUN/CREAT SERPL: 11.6 (ref 7–25)
CALCIUM SPEC-SCNC: 9.5 MG/DL (ref 8.6–10.5)
CHLORIDE SERPL-SCNC: 103 MMOL/L (ref 98–107)
CO2 SERPL-SCNC: 27.3 MMOL/L (ref 22–29)
CREAT SERPL-MCNC: 0.95 MG/DL (ref 0.57–1)
DEPRECATED RDW RBC AUTO: 40.3 FL (ref 37–54)
EOSINOPHIL # BLD AUTO: 0.18 10*3/MM3 (ref 0–0.4)
EOSINOPHIL NFR BLD AUTO: 2.1 % (ref 0.3–6.2)
ERYTHROCYTE [DISTWIDTH] IN BLOOD BY AUTOMATED COUNT: 12.3 % (ref 12.3–15.4)
GFR SERPL CREATININE-BSD FRML MDRD: 72 ML/MIN/1.73
GLOBULIN UR ELPH-MCNC: 2.5 GM/DL
GLUCOSE SERPL-MCNC: 123 MG/DL (ref 65–99)
HCG INTACT+B SERPL-ACNC: <0.5 MIU/ML
HCT VFR BLD AUTO: 46 % (ref 34–46.6)
HGB BLD-MCNC: 15.2 G/DL (ref 12–15.9)
HOLD SPECIMEN: NORMAL
HOLD SPECIMEN: NORMAL
IMM GRANULOCYTES # BLD AUTO: 0.02 10*3/MM3 (ref 0–0.05)
IMM GRANULOCYTES NFR BLD AUTO: 0.2 % (ref 0–0.5)
LIPASE SERPL-CCNC: 45 U/L (ref 13–60)
LYMPHOCYTES # BLD AUTO: 3.09 10*3/MM3 (ref 0.7–3.1)
LYMPHOCYTES NFR BLD AUTO: 36.4 % (ref 19.6–45.3)
MCH RBC QN AUTO: 30 PG (ref 26.6–33)
MCHC RBC AUTO-ENTMCNC: 33 G/DL (ref 31.5–35.7)
MCV RBC AUTO: 90.7 FL (ref 79–97)
MONOCYTES # BLD AUTO: 0.51 10*3/MM3 (ref 0.1–0.9)
MONOCYTES NFR BLD AUTO: 6 % (ref 5–12)
NEUTROPHILS NFR BLD AUTO: 4.61 10*3/MM3 (ref 1.7–7)
NEUTROPHILS NFR BLD AUTO: 54.2 % (ref 42.7–76)
NRBC BLD AUTO-RTO: 0 /100 WBC (ref 0–0.2)
PLATELET # BLD AUTO: 247 10*3/MM3 (ref 140–450)
PMV BLD AUTO: 10.7 FL (ref 6–12)
POTASSIUM SERPL-SCNC: 4.1 MMOL/L (ref 3.5–5.2)
PROT SERPL-MCNC: 6.9 G/DL (ref 6–8.5)
RBC # BLD AUTO: 5.07 10*6/MM3 (ref 3.77–5.28)
SODIUM SERPL-SCNC: 143 MMOL/L (ref 136–145)
WBC NRBC COR # BLD: 8.5 10*3/MM3 (ref 3.4–10.8)
WHOLE BLOOD HOLD SPECIMEN: NORMAL
WHOLE BLOOD HOLD SPECIMEN: NORMAL

## 2022-02-26 PROCEDURE — 36415 COLL VENOUS BLD VENIPUNCTURE: CPT

## 2022-02-26 PROCEDURE — 99283 EMERGENCY DEPT VISIT LOW MDM: CPT

## 2022-02-26 PROCEDURE — 25010000002 DICYCLOMINE PER 20 MG: Performed by: NURSE PRACTITIONER

## 2022-02-26 PROCEDURE — 80053 COMPREHEN METABOLIC PANEL: CPT

## 2022-02-26 PROCEDURE — 96372 THER/PROPH/DIAG INJ SC/IM: CPT

## 2022-02-26 PROCEDURE — 85025 COMPLETE CBC W/AUTO DIFF WBC: CPT

## 2022-02-26 PROCEDURE — 84702 CHORIONIC GONADOTROPIN TEST: CPT

## 2022-02-26 PROCEDURE — 83690 ASSAY OF LIPASE: CPT

## 2022-02-26 RX ORDER — DICYCLOMINE HCL 20 MG
20 TABLET ORAL EVERY 6 HOURS
Qty: 20 TABLET | Refills: 0 | Status: SHIPPED | OUTPATIENT
Start: 2022-02-26 | End: 2022-03-18

## 2022-02-26 RX ORDER — ONDANSETRON 2 MG/ML
4 INJECTION INTRAMUSCULAR; INTRAVENOUS ONCE
Status: DISCONTINUED | OUTPATIENT
Start: 2022-02-26 | End: 2022-02-27 | Stop reason: HOSPADM

## 2022-02-26 RX ORDER — ONDANSETRON 4 MG/1
4 TABLET, ORALLY DISINTEGRATING ORAL EVERY 8 HOURS PRN
Qty: 10 TABLET | Refills: 0 | OUTPATIENT
Start: 2022-02-26 | End: 2022-05-10

## 2022-02-26 RX ORDER — DICYCLOMINE HYDROCHLORIDE 10 MG/ML
20 INJECTION INTRAMUSCULAR ONCE
Status: COMPLETED | OUTPATIENT
Start: 2022-02-26 | End: 2022-02-26

## 2022-02-26 RX ORDER — KETOROLAC TROMETHAMINE 30 MG/ML
30 INJECTION, SOLUTION INTRAMUSCULAR; INTRAVENOUS ONCE
Status: DISCONTINUED | OUTPATIENT
Start: 2022-02-26 | End: 2022-02-27 | Stop reason: HOSPADM

## 2022-02-26 RX ORDER — SODIUM CHLORIDE 0.9 % (FLUSH) 0.9 %
10 SYRINGE (ML) INJECTION AS NEEDED
Status: DISCONTINUED | OUTPATIENT
Start: 2022-02-26 | End: 2022-02-27 | Stop reason: HOSPADM

## 2022-02-26 RX ADMIN — DICYCLOMINE HYDROCHLORIDE 20 MG: 20 INJECTION, SOLUTION INTRAMUSCULAR at 20:46

## 2022-03-16 NOTE — PROGRESS NOTES
Post IUD Visit      CC:  Scheduled IUD check  No chief complaint on file.      HPI:  Pain/Cramping:  {YES NO:96035}  Vaginal Bleeding:  {YES NO:11663}  Pain w sex: {YES NO:83841}  Feels strings easily:  {YES NO:73921}  Last PAP date and results: ***    PHYSICAL EXAM:  There were no vitals taken for this visit.  General- NAD, alert and oriented, appropriate  Psych- Normal mood, good memory  Abdomen- Soft, non distended, non tender, no masses  External genitalia- Normal, no lesions  Urethra- Normal, no masses, non tender  Vagina- Normal, no atrophy, no discharge, no prolapse  Bladder- Normal, no masses, non tender, no prolapse  Cvx- {APCVX:38997}  Uterus- {APUTERUS:42067}  Adnexa- {APADNEXA:47096}    ASSESSMENT AND PLAN:    There are no diagnoses linked to this encounter.    Counseling:  Pt was counseled to perform monthly string checks. Keep track of menses.    RTO if <q21d, >7d long, or heavy or if positive pregnancy test.    Continue OTC motrin and/or tylenol PRN cramping    Follow Up:  No follow-ups on file.    {Separate E+M Time Carve Out (Optional):62395}  {Time Spent-Use for E/M Coding (Optional):69092}    MICHEAL Garcia  03/18/2022    [facility]  [address] [place] [address]  Dept: [phone]  Dept Fax: [phone]  Loc: [phone]  Loc Fax: [phone]

## 2022-03-18 ENCOUNTER — OFFICE VISIT (OUTPATIENT)
Dept: OBSTETRICS AND GYNECOLOGY | Facility: CLINIC | Age: 27
End: 2022-03-18

## 2022-03-18 VITALS
HEIGHT: 64 IN | HEART RATE: 69 BPM | SYSTOLIC BLOOD PRESSURE: 118 MMHG | DIASTOLIC BLOOD PRESSURE: 84 MMHG | WEIGHT: 254 LBS | BODY MASS INDEX: 43.36 KG/M2

## 2022-03-18 DIAGNOSIS — Z11.3 SCREEN FOR STD (SEXUALLY TRANSMITTED DISEASE): ICD-10-CM

## 2022-03-18 DIAGNOSIS — N83.201 CYST OF RIGHT OVARY: Primary | ICD-10-CM

## 2022-03-18 DIAGNOSIS — Z30.014 ENCOUNTER FOR INITIAL PRESCRIPTION OF INTRAUTERINE CONTRACEPTIVE DEVICE (IUD): ICD-10-CM

## 2022-03-18 LAB
C TRACH RRNA CVX QL NAA+PROBE: NOT DETECTED
CANDIDA SPECIES: NEGATIVE
GARDNERELLA VAGINALIS: NEGATIVE
N GONORRHOEA RRNA SPEC QL NAA+PROBE: NOT DETECTED
T VAGINALIS DNA VAG QL PROBE+SIG AMP: NEGATIVE

## 2022-03-18 PROCEDURE — 87660 TRICHOMONAS VAGIN DIR PROBE: CPT | Performed by: NURSE PRACTITIONER

## 2022-03-18 PROCEDURE — 87480 CANDIDA DNA DIR PROBE: CPT | Performed by: NURSE PRACTITIONER

## 2022-03-18 PROCEDURE — 87510 GARDNER VAG DNA DIR PROBE: CPT | Performed by: NURSE PRACTITIONER

## 2022-03-18 PROCEDURE — 87591 N.GONORRHOEAE DNA AMP PROB: CPT | Performed by: NURSE PRACTITIONER

## 2022-03-18 PROCEDURE — 87491 CHLMYD TRACH DNA AMP PROBE: CPT | Performed by: NURSE PRACTITIONER

## 2022-03-18 PROCEDURE — 99214 OFFICE O/P EST MOD 30 MIN: CPT | Performed by: NURSE PRACTITIONER

## 2022-03-18 NOTE — PROGRESS NOTES
"GYN Problem/Follow Up Visit    Chief Complaint   Patient presents with   • Follow-up     ER did u/s and found cyst on right ovary           HPI  Kinsey Suresh is a 26 y.o. female, , who presents for follow up after going to the ER 22 for c/o abdominal pain, diagnosed with Cdiff and told 4 cm right ovarian cyst per ct. The pelvic pain has resolved. Menses, occur monthly, 3-5 days, on heavy change products q 6 hours. Menstrual cramps are moderate 1st day and when sitting or lying down. Desires contraception.  Desires std screen.     Additional OB/GYN History   Patient's last menstrual period was 2022 (exact date).  Current contraception: contraceptive methods: Abstinence  Desires to: start contraception  Allergies : Ciprofloxacin, Doxycycline hyclate, Doxycycline monohydrate, Latex, Sulfa antibiotics, Sulfamethoxazole-trimethoprim, and Trimethoprim     The additional following portions of the patient's history were reviewed and updated as appropriate: allergies, current medications, past family history, past medical history, past social history, past surgical history and problem list.    Review of Systems    I have reviewed and agree with the HPI, ROS, and historical information as entered above. Alaina Decker, APRN    Objective   /84   Pulse 69   Ht 162.6 cm (64\")   Wt 115 kg (254 lb)   LMP 2022 (Exact Date)   Breastfeeding No   BMI 43.60 kg/m²     Physical Exam  Vitals and nursing note reviewed. Exam conducted with a chaperone present.   Constitutional:       Appearance: Normal appearance.   Neck:      Thyroid: No thyromegaly.   Cardiovascular:      Rate and Rhythm: Normal rate and regular rhythm.      Heart sounds: Normal heart sounds.   Pulmonary:      Effort: Pulmonary effort is normal.      Breath sounds: Normal breath sounds.   Abdominal:      General: There is no distension.      Palpations: Abdomen is soft.      Tenderness: There is no right CVA tenderness or left CVA " tenderness.   Genitourinary:     General: Normal vulva.      Vagina: Normal. Bleeding: menstrual.      Cervix: Normal.      Uterus: Normal.       Adnexa:         Right: Tenderness (mild) present.         Left: Tenderness (mild) present.    Lymphadenopathy:      Lower Body: No right inguinal adenopathy. No left inguinal adenopathy.   Skin:     General: Skin is warm and dry.   Neurological:      Mental Status: She is alert and oriented to person, place, and time.          Assessment and Plan    Diagnoses and all orders for this visit:    1. Cyst of right ovary (Primary)  -     US Non-ob Transvaginal; Future    2. Encounter for initial prescription of intrauterine contraceptive device (IUD)  -     hCG, Quantitative, Pregnancy; Future    3. Screen for STD (sexually transmitted disease)  -     Chlamydia trachomatis, Neisseria gonorrhoeae, PCR - Swab, Cervix  -     Gardnerella vaginalis, Trichomonas vaginalis, Candida albicans, DNA - Swab, Vagina        Counseling:  • All BC Options R/B/A/SE/E of each  • Safe Sex/Condoms  • OCP/Hormone use risk NAHUM   • Desires Mirena IUD        She understands the importance of having the above orders performed in a timely fashion.  The risks of not performing them include, but are not limited to, cancer and/or subsequent increase in morbidity and/or mortality.  She is encouraged to review her results online and/or contact or office if she has questions.     Follow Up:  Return for precert for mirena IUD/WWE.        Alaina Decker, MICHEAL  03/18/2022

## 2022-04-11 ENCOUNTER — TELEPHONE (OUTPATIENT)
Dept: OBSTETRICS AND GYNECOLOGY | Facility: CLINIC | Age: 27
End: 2022-04-11

## 2022-04-11 NOTE — TELEPHONE ENCOUNTER
Pt called stating she has a new primary insurance. She is scheduled for IUD insertion. I updated her insurance and sent a message to KAYLEN for precert.

## 2022-04-13 ENCOUNTER — APPOINTMENT (OUTPATIENT)
Dept: CT IMAGING | Facility: HOSPITAL | Age: 27
End: 2022-04-13

## 2022-04-13 ENCOUNTER — TRANSCRIBE ORDERS (OUTPATIENT)
Dept: ADMINISTRATIVE | Facility: HOSPITAL | Age: 27
End: 2022-04-13

## 2022-04-13 DIAGNOSIS — R19.7 DIARRHEA, UNSPECIFIED TYPE: ICD-10-CM

## 2022-04-13 DIAGNOSIS — R10.9 ABDOMINAL PAIN, UNSPECIFIED ABDOMINAL LOCATION: Primary | ICD-10-CM

## 2022-04-19 ENCOUNTER — LAB (OUTPATIENT)
Dept: LAB | Facility: HOSPITAL | Age: 27
End: 2022-04-19

## 2022-04-19 DIAGNOSIS — Z30.014 ENCOUNTER FOR INITIAL PRESCRIPTION OF INTRAUTERINE CONTRACEPTIVE DEVICE (IUD): ICD-10-CM

## 2022-04-19 LAB — HCG INTACT+B SERPL-ACNC: <0.5 MIU/ML

## 2022-04-19 PROCEDURE — 36415 COLL VENOUS BLD VENIPUNCTURE: CPT

## 2022-04-19 PROCEDURE — 84702 CHORIONIC GONADOTROPIN TEST: CPT

## 2022-04-20 ENCOUNTER — HOSPITAL ENCOUNTER (OUTPATIENT)
Dept: ULTRASOUND IMAGING | Facility: HOSPITAL | Age: 27
Discharge: HOME | End: 2022-04-20

## 2022-04-20 ENCOUNTER — HOSPITAL ENCOUNTER (OUTPATIENT)
Dept: CT IMAGING | Facility: HOSPITAL | Age: 27
Discharge: HOME | End: 2022-04-20

## 2022-04-20 ENCOUNTER — PROCEDURE VISIT (OUTPATIENT)
Dept: OBSTETRICS AND GYNECOLOGY | Facility: CLINIC | Age: 27
End: 2022-04-20

## 2022-04-20 VITALS
HEART RATE: 72 BPM | WEIGHT: 258 LBS | DIASTOLIC BLOOD PRESSURE: 74 MMHG | SYSTOLIC BLOOD PRESSURE: 118 MMHG | BODY MASS INDEX: 44.29 KG/M2

## 2022-04-20 DIAGNOSIS — R10.9 ABDOMINAL PAIN, UNSPECIFIED ABDOMINAL LOCATION: ICD-10-CM

## 2022-04-20 DIAGNOSIS — R19.7 DIARRHEA, UNSPECIFIED TYPE: ICD-10-CM

## 2022-04-20 DIAGNOSIS — N83.201 CYST OF RIGHT OVARY: ICD-10-CM

## 2022-04-20 DIAGNOSIS — Z30.430 ENCOUNTER FOR INSERTION OF INTRAUTERINE CONTRACEPTIVE DEVICE (IUD): Primary | ICD-10-CM

## 2022-04-20 PROCEDURE — 74176 CT ABD & PELVIS W/O CONTRAST: CPT

## 2022-04-20 PROCEDURE — 58300 INSERT INTRAUTERINE DEVICE: CPT | Performed by: OBSTETRICS & GYNECOLOGY

## 2022-04-20 PROCEDURE — 76830 TRANSVAGINAL US NON-OB: CPT

## 2022-04-20 NOTE — PROGRESS NOTES
IUD Placement    Sex in last 2 weeks:  No  Bhcg: negative  Consent signed: Yes    CC: Presents for IUD placement    Procedure was explained in detail.  She understands the potential risks include, but are not limited to, failure (pregnancy), pain, bleeding, uterine perforation, and infection.  Her questions have been answered.      Subjective:  Here for mirena. No c/o voiced.    Objective:  /74   Pulse 72   Wt 117 kg (258 lb)   BMI 44.29 kg/m²   General- NAD, alert and oriented, appropriate  Psych- Normal mood, good memory  Abdomen- Soft, non distended, non tender, no masses  External genitalia- Normal, no lesions  Vagina- Normal, no discharge  Uterus- Normal size, shape & consistency.  Non tender, mobile, & no prolapse  Cvx- Normal without lesion or discharge.    Betadine x3.  Tenaculum placed.  Uterus sounded to 7.5cm.    Mirena IUD placed without difficulty.    Strings cut 2cm.    Patient tolerated well.      Assessment and Plan:  Diagnoses and all orders for this visit:    1. Encounter for insertion of intrauterine contraceptive device (IUD) (Primary)  -     levonorgestrel (MIRENA) 20 MCG/24HR IUD          Counseling:  She was counseled on the use of the IUD.  It provides contraception for 5 years (Mirena/Kyleena/Liletta) or 3 years (Adeline) or 10 years (Copper).  Failure is <1%.  It does not protect her from STDs and condoms are recommended.  She has been instructed to check the strings monthly.      PRECAUTIONS-- If she has any fevers >101.5F, and abdominal/pelvic pain, or bleeding > 1pad/2hours, she needs to call our office or on call/ER (after hours/weekend).  She understands the potential risk of pelvic inflammatory disease and the potential for an effect on her future fertility if she does not seek immediate evaluation.  Cramping due to the IUD should be controlled with a OTC reliever/NSAID.  If not, she should call our office.  If she misses a period and has a positive pregnancy test she must  immediately seek medical attention due to the risk of ectopic pregnancy which can be life threatening.  She understands removal can sometimes be difficult and can require surgery.    Follow Up:  Return in about 4 weeks (around 5/18/2022) for post iud check.      Felicita Edward, MICHEAL  04/20/2022

## 2022-04-25 ENCOUNTER — TELEPHONE (OUTPATIENT)
Dept: OBSTETRICS AND GYNECOLOGY | Facility: CLINIC | Age: 27
End: 2022-04-25

## 2022-04-25 NOTE — TELEPHONE ENCOUNTER
----- Message from MICHEAL Garcia sent at 4/25/2022 12:27 PM EDT -----  The right ovarian 4cm cyst seen on CT has resolved per fu pelvic ultrasound no further workup indicated unless symptoms recurr

## 2022-05-09 ENCOUNTER — OFFICE VISIT (OUTPATIENT)
Dept: GASTROENTEROLOGY | Facility: CLINIC | Age: 27
End: 2022-05-09

## 2022-05-09 ENCOUNTER — PREP FOR SURGERY (OUTPATIENT)
Dept: OTHER | Facility: HOSPITAL | Age: 27
End: 2022-05-09

## 2022-05-09 VITALS
WEIGHT: 254.4 LBS | BODY MASS INDEX: 43.43 KG/M2 | HEIGHT: 64 IN | HEART RATE: 63 BPM | SYSTOLIC BLOOD PRESSURE: 108 MMHG | DIASTOLIC BLOOD PRESSURE: 59 MMHG

## 2022-05-09 DIAGNOSIS — R93.3 ABNORMAL CT SCAN, GASTROINTESTINAL TRACT: Primary | ICD-10-CM

## 2022-05-09 DIAGNOSIS — R93.3 ABNORMAL CT SCAN, GASTROINTESTINAL TRACT: ICD-10-CM

## 2022-05-09 DIAGNOSIS — Z86.19 HISTORY OF CLOSTRIDIOIDES DIFFICILE INFECTION: ICD-10-CM

## 2022-05-09 DIAGNOSIS — R10.84 GENERALIZED ABDOMINAL PAIN: ICD-10-CM

## 2022-05-09 DIAGNOSIS — R12 HEARTBURN: ICD-10-CM

## 2022-05-09 DIAGNOSIS — R10.84 GENERALIZED ABDOMINAL PAIN: Primary | ICD-10-CM

## 2022-05-09 PROCEDURE — 99214 OFFICE O/P EST MOD 30 MIN: CPT | Performed by: NURSE PRACTITIONER

## 2022-05-09 RX ORDER — POLYETHYLENE GLYCOL 3350, SODIUM SULFATE, SODIUM CHLORIDE, POTASSIUM CHLORIDE, ASCORBIC ACID, SODIUM ASCORBATE 140-9-5.2G
1 KIT ORAL 2 TIMES DAILY
Qty: 1 EACH | Refills: 0 | Status: SHIPPED | OUTPATIENT
Start: 2022-05-09 | End: 2022-05-10

## 2022-05-09 RX ORDER — FAMOTIDINE 20 MG/1
20 TABLET, FILM COATED ORAL 2 TIMES DAILY
Qty: 60 TABLET | Refills: 1 | Status: SHIPPED | OUTPATIENT
Start: 2022-05-09 | End: 2022-06-02

## 2022-05-09 NOTE — PROGRESS NOTES
Patient Name: Kinsey Suresh   Visit Date: 05/09/2022   Patient ID: [id number]  Provider: MICHEAL Flower    Sex: female  Location:  Location Address:  Location Phone: [phone]  [place] [address] [phone]    YOB: 1995  Age: 27 y.o.   Primary Care Provider Paulina Gale APRN      Referring Provider: CATHY Hogue        Chief Complaint  Ulcerative Colitis (Hx of Difficile colitis ), C Diff (Pt had C Diff in the beginning of the year.), and Abdominal Pain (2x a week. Pt has had diarrhea 2 times this week )    History of Present Illness    Patient initially presented July 2017 for fatty liver, elevated alk phos (later resolved), diarrhea, lower abdominal pain.  Hepatic work-up was negative in 2017, fibro-sure showed F0, A0.    Negative EGD/colonoscopy May 2019, negative CT of the abdomen and pelvis     Patient was last seen November 2020 and had some blood noted with wiping, reported dicyclomine helped abdominal cramping, she was advised to follow-up if symptoms persisted and was given Colace and Proctosone    4/12/2022: CBC within normal limits, CMP within normal limits  CT of the abdomen and pelvis without contrast for 2022-questionable slightly distended appendix without inflammation, follow-up CT recommended, small mesenteric lymph nodes in the right lower quadrant    Pt presents today to f/u on cdiff - states + cdiff study a couple months ago. Pt states she was treated w Flagyl x 2. She did take antibiotics prior to this. Some c/o abd pain, generalized/epigastric area. No abd pain after meals, has had some acid reflux c/o and HB after meals. No blood in stool.   Pt states stool is now alternating b/w formed , soft stools. [place] #5-6, sometimes #4.     Past Medical History:   Diagnosis Date   • Anxiety    • Asthma    • Clostridium difficile diarrhea 01/30/2022   • GERD (gastroesophageal reflux disease)    • Kidney stones    • Migraine    • Ovarian cyst  "    right ovary    • PVC (premature ventricular contraction)        Past Surgical History:   Procedure Laterality Date   • COLONOSCOPY     • ENDOSCOPY     • KIDNEY STONE SURGERY  11/2021   • URETEROSCOPY LASER LITHOTRIPSY WITH STENT INSERTION Left 11/13/2021    Procedure: URETEROSCOPY LASER LITHOTRIPSY WITH STENT INSERTION STONE BASKET EXTRACTION;  Surgeon: Colt Sanders MD;  [facility];  Service: Urology;  Laterality: Left;       Allergies   Allergen Reactions   • Ciprofloxacin Rash   • Doxycycline Hyclate Rash   • Doxycycline Monohydrate Rash   • Latex Rash   • Sulfa Antibiotics Rash   • Sulfamethoxazole-Trimethoprim Rash and Hives   • Trimethoprim Rash       Family History   Problem Relation Age of Onset   • Heart failure Mother    • Colon cancer Maternal Grandfather         Social History     Tobacco Use   • Smoking status: Passive Smoke Exposure - Never Smoker   • Smokeless tobacco: Never Used   • Tobacco comment: secondhand smoke exposure   Vaping Use   • Vaping Use: Never used   Substance Use Topics   • Alcohol use: No   • Drug use: No       Objective     Vital Signs:   /59 (BP Location: Left arm, Patient Position: Sitting, Cuff Size: Adult)   Pulse 63   Ht 162.6 cm (64\")   Wt 115 kg (254 lb 6.4 oz)   BMI 43.67 kg/m²       Physical Exam  Constitutional:       General: The patient is not in acute distress.     Appearance: Normal appearance.   HENT:      Head: Normocephalic and atraumatic.      Nose: Nose normal.   Pulmonary:      Effort: Pulmonary effort is normal. No respiratory distress.   Abdominal:      General: Abdomen is flat.      Palpations: Abdomen is soft. There is no mass.      Tenderness: There is no abdominal tenderness. There is no guarding.   Musculoskeletal:      Cervical back: Neck supple.      Right lower leg: No edema.      Left lower leg: No edema.   Skin:     General: Skin is warm and dry.   Neurological:      General: No focal deficit present.      Mental " Status: The patient is alert and oriented to person, place, and time.      Gait: Gait normal.   Psychiatric:         Mood and Affect: Mood normal.         Speech: Speech normal.         Behavior: Behavior normal.         Thought Content: Thought content normal.     Result Review :   The following data was reviewed by: MICHEAL Flower on 05/09/2022:    CBC w/diff    CBC w/Diff 2/6/22 2/26/22 4/12/22   WBC 8.39 8.50 6.84   RBC 4.93 5.07 5.00   Hemoglobin 15.2 15.2 14.9   Hematocrit 44.3 46.0 44.9   MCV 89.9 90.7 89.8   MCH 30.8 30.0 29.8   MCHC 34.3 33.0 33.2   RDW 12.2 (A) 12.3 11.9 (A)   Platelets 228 247 217   Neutrophil Rel % 51.4 54.2 55.2   Immature Granulocyte Rel % 0.2 0.2 0.0   Lymphocyte Rel % 39.0 36.4 35.4   Monocyte Rel % 6.6 6.0 6.9   Eosinophil Rel % 2.0 2.1 1.5   Basophil Rel % 0.8 1.1 1.0   (A) Abnormal value            CMP    CMP 12/29/21 2/6/22 2/26/22   Glucose 113 (A) 96 123 (A)   BUN 8 8 11   Creatinine 0.80 0.89 0.95   eGFR Non African Am 88 78 72   Sodium 140 140 143   Potassium 3.7 3.8 4.1   Chloride 104 105 103   Calcium 9.6 9.0 9.5   Albumin 4.50 4.00 4.40   Total Bilirubin 0.4 0.2 0.2   Alkaline Phosphatase 102 79 95   AST (SGOT) 23 22 22   ALT (SGPT) 25 29 22   (A) Abnormal value       Comments are available for some flowsheets but are not being displayed.                         Assessment and Plan    Diagnoses and all orders for this visit:    1. Abnormal CT scan, gastrointestinal tract (Primary)  -     CT Abdomen Pelvis With Contrast; Future    2. Generalized abdominal pain  -     CT Abdomen Pelvis With Contrast; Future    3. History of Clostridioides difficile infection    4. Heartburn    Other orders  -     PEG-KCl-NaCl-NaSulf-Na Asc-C (Plenvu) 140 g reconstituted solution solution; Take 140 g by mouth 2 (Two) Times a Day for 1 day. Take per office instructions  Dispense: 1 each; Refill: 0  -     famotidine (Pepcid) 20 MG tablet; Take 1 tablet by mouth 2 (Two) Times a  Day for 30 days.  Dispense: 60 tablet; Refill: 1            Follow Up      Colonoscopy Surgical Risk and Benefits: Possible risks/complications, benefits, and alternatives to surgical or invasive procedure have been explained to patient and/or legal guardian; risks include bleeding, infection, and perforation. Patient has been evaluated and can tolerate anesthesia and/or sedation. Risks, benefits, and alternatives to anesthesia and sedation have been explained to patient and/or legal guardian.   Recheck CT in June  Pepcid 20 BID trial   Recommended Florajen over-the-counter daily  Patient was given instructions and counseling regarding her condition or for health maintenance advice. Please see specific information pulled into the AVS if appropriate.

## 2022-05-10 ENCOUNTER — HOSPITAL ENCOUNTER (EMERGENCY)
Facility: HOSPITAL | Age: 27
Discharge: HOME | End: 2022-05-10
Attending: EMERGENCY MEDICINE | Admitting: EMERGENCY MEDICINE

## 2022-05-10 ENCOUNTER — APPOINTMENT (OUTPATIENT)
Dept: GENERAL RADIOLOGY | Facility: HOSPITAL | Age: 27
End: 2022-05-10

## 2022-05-10 VITALS
BODY MASS INDEX: 43.66 KG/M2 | WEIGHT: 255.73 LBS | SYSTOLIC BLOOD PRESSURE: 91 MMHG | OXYGEN SATURATION: 94 % | TEMPERATURE: 99 F | HEART RATE: 64 BPM | HEIGHT: 64 IN | RESPIRATION RATE: 16 BRPM | DIASTOLIC BLOOD PRESSURE: 67 MMHG

## 2022-05-10 DIAGNOSIS — N39.0 ACUTE UTI: Primary | ICD-10-CM

## 2022-05-10 DIAGNOSIS — R07.89 ATYPICAL CHEST PAIN: ICD-10-CM

## 2022-05-10 LAB
ALBUMIN SERPL-MCNC: 4.5 G/DL (ref 3.5–5.2)
ALBUMIN/GLOB SERPL: 1.7 G/DL
ALP SERPL-CCNC: 106 U/L (ref 39–117)
ALT SERPL W P-5'-P-CCNC: 17 U/L (ref 1–33)
ANION GAP SERPL CALCULATED.3IONS-SCNC: 9.7 MMOL/L (ref 5–15)
AST SERPL-CCNC: 16 U/L (ref 1–32)
BACTERIA UR QL AUTO: ABNORMAL /HPF
BASOPHILS # BLD AUTO: 0.07 10*3/MM3 (ref 0–0.2)
BASOPHILS NFR BLD AUTO: 0.8 % (ref 0–1.5)
BILIRUB SERPL-MCNC: 0.4 MG/DL (ref 0–1.2)
BILIRUB UR QL STRIP: NEGATIVE
BUN SERPL-MCNC: 8 MG/DL (ref 6–20)
BUN/CREAT SERPL: 8.9 (ref 7–25)
CALCIUM SPEC-SCNC: 9.5 MG/DL (ref 8.6–10.5)
CHLORIDE SERPL-SCNC: 102 MMOL/L (ref 98–107)
CLARITY UR: ABNORMAL
CO2 SERPL-SCNC: 25.3 MMOL/L (ref 22–29)
COLOR UR: YELLOW
CREAT SERPL-MCNC: 0.9 MG/DL (ref 0.57–1)
DEPRECATED RDW RBC AUTO: 39.5 FL (ref 37–54)
EGFRCR SERPLBLD CKD-EPI 2021: 91.2 ML/MIN/1.73
EOSINOPHIL # BLD AUTO: 0.09 10*3/MM3 (ref 0–0.4)
EOSINOPHIL NFR BLD AUTO: 1 % (ref 0.3–6.2)
ERYTHROCYTE [DISTWIDTH] IN BLOOD BY AUTOMATED COUNT: 11.9 % (ref 12.3–15.4)
GLOBULIN UR ELPH-MCNC: 2.7 GM/DL
GLUCOSE SERPL-MCNC: 90 MG/DL (ref 65–99)
GLUCOSE UR STRIP-MCNC: NEGATIVE MG/DL
HCG INTACT+B SERPL-ACNC: <0.5 MIU/ML
HCT VFR BLD AUTO: 48.4 % (ref 34–46.6)
HGB BLD-MCNC: 16.2 G/DL (ref 12–15.9)
HGB UR QL STRIP.AUTO: ABNORMAL
HOLD SPECIMEN: NORMAL
HOLD SPECIMEN: NORMAL
HYALINE CASTS UR QL AUTO: ABNORMAL /LPF
IMM GRANULOCYTES # BLD AUTO: 0.02 10*3/MM3 (ref 0–0.05)
IMM GRANULOCYTES NFR BLD AUTO: 0.2 % (ref 0–0.5)
KETONES UR QL STRIP: NEGATIVE
LEUKOCYTE ESTERASE UR QL STRIP.AUTO: ABNORMAL
LIPASE SERPL-CCNC: 23 U/L (ref 13–60)
LYMPHOCYTES # BLD AUTO: 2.53 10*3/MM3 (ref 0.7–3.1)
LYMPHOCYTES NFR BLD AUTO: 28.9 % (ref 19.6–45.3)
MCH RBC QN AUTO: 30.5 PG (ref 26.6–33)
MCHC RBC AUTO-ENTMCNC: 33.5 G/DL (ref 31.5–35.7)
MCV RBC AUTO: 91 FL (ref 79–97)
MONOCYTES # BLD AUTO: 0.45 10*3/MM3 (ref 0.1–0.9)
MONOCYTES NFR BLD AUTO: 5.1 % (ref 5–12)
NEUTROPHILS NFR BLD AUTO: 5.58 10*3/MM3 (ref 1.7–7)
NEUTROPHILS NFR BLD AUTO: 64 % (ref 42.7–76)
NITRITE UR QL STRIP: NEGATIVE
NRBC BLD AUTO-RTO: 0 /100 WBC (ref 0–0.2)
PH UR STRIP.AUTO: 6.5 [PH] (ref 5–8)
PLATELET # BLD AUTO: 242 10*3/MM3 (ref 140–450)
PMV BLD AUTO: 10.2 FL (ref 6–12)
POTASSIUM SERPL-SCNC: 4.1 MMOL/L (ref 3.5–5.2)
PROT SERPL-MCNC: 7.2 G/DL (ref 6–8.5)
PROT UR QL STRIP: NEGATIVE
RBC # BLD AUTO: 5.32 10*6/MM3 (ref 3.77–5.28)
RBC # UR STRIP: ABNORMAL /HPF
REF LAB TEST METHOD: ABNORMAL
SODIUM SERPL-SCNC: 137 MMOL/L (ref 136–145)
SP GR UR STRIP: 1.02 (ref 1–1.03)
SQUAMOUS #/AREA URNS HPF: ABNORMAL /HPF
TRANS CELLS #/AREA URNS HPF: ABNORMAL /HPF
UROBILINOGEN UR QL STRIP: ABNORMAL
WBC # UR STRIP: ABNORMAL /HPF
WBC NRBC COR # BLD: 8.74 10*3/MM3 (ref 3.4–10.8)
WHOLE BLOOD HOLD COAG: NORMAL
WHOLE BLOOD HOLD SPECIMEN: NORMAL

## 2022-05-10 PROCEDURE — 81001 URINALYSIS AUTO W/SCOPE: CPT | Performed by: EMERGENCY MEDICINE

## 2022-05-10 PROCEDURE — 96372 THER/PROPH/DIAG INJ SC/IM: CPT

## 2022-05-10 PROCEDURE — 85025 COMPLETE CBC W/AUTO DIFF WBC: CPT | Performed by: EMERGENCY MEDICINE

## 2022-05-10 PROCEDURE — 99283 EMERGENCY DEPT VISIT LOW MDM: CPT

## 2022-05-10 PROCEDURE — 80053 COMPREHEN METABOLIC PANEL: CPT | Performed by: EMERGENCY MEDICINE

## 2022-05-10 PROCEDURE — 74022 RADEX COMPL AQT ABD SERIES: CPT

## 2022-05-10 PROCEDURE — 36415 COLL VENOUS BLD VENIPUNCTURE: CPT

## 2022-05-10 PROCEDURE — 83690 ASSAY OF LIPASE: CPT | Performed by: EMERGENCY MEDICINE

## 2022-05-10 PROCEDURE — 84702 CHORIONIC GONADOTROPIN TEST: CPT | Performed by: EMERGENCY MEDICINE

## 2022-05-10 PROCEDURE — 25010000002 KETOROLAC TROMETHAMINE PER 15 MG: Performed by: EMERGENCY MEDICINE

## 2022-05-10 RX ORDER — KETOROLAC TROMETHAMINE 30 MG/ML
60 INJECTION, SOLUTION INTRAMUSCULAR; INTRAVENOUS ONCE
Status: COMPLETED | OUTPATIENT
Start: 2022-05-10 | End: 2022-05-10

## 2022-05-10 RX ORDER — CEPHALEXIN 500 MG/1
500 CAPSULE ORAL 4 TIMES DAILY
Qty: 20 CAPSULE | Refills: 0 | Status: SHIPPED | OUTPATIENT
Start: 2022-05-10 | End: 2022-08-25

## 2022-05-10 RX ORDER — KETOROLAC TROMETHAMINE 30 MG/ML
30 INJECTION, SOLUTION INTRAMUSCULAR; INTRAVENOUS ONCE
Status: DISCONTINUED | OUTPATIENT
Start: 2022-05-10 | End: 2022-05-10

## 2022-05-10 RX ORDER — ONDANSETRON 8 MG/1
8 TABLET, ORALLY DISINTEGRATING ORAL 4 TIMES DAILY PRN
Qty: 20 TABLET | Refills: 0 | OUTPATIENT
Start: 2022-05-10 | End: 2022-06-25

## 2022-05-10 RX ORDER — SODIUM CHLORIDE 0.9 % (FLUSH) 0.9 %
10 SYRINGE (ML) INJECTION AS NEEDED
Status: DISCONTINUED | OUTPATIENT
Start: 2022-05-10 | End: 2022-05-10 | Stop reason: HOSPADM

## 2022-05-10 RX ADMIN — KETOROLAC TROMETHAMINE 60 MG: 60 INJECTION, SOLUTION INTRAMUSCULAR at 13:32

## 2022-05-10 NOTE — DISCHARGE INSTRUCTIONS
Rest at home.  Drink plenty of fluids.  Diet as tolerated.  Take the antibiotics as prescribed.  Take the nausea vomiting medication as needed as prescribed.  Follow with your family doctor in 1 week.  Return to the ER for any change or worsening symptoms.

## 2022-05-10 NOTE — ED PROVIDER NOTES
Time: 3:10 PM EDT  Arrived by: Private vehicle  Chief Complaint: Abdominal pain and shortness of breath  History provided by: Patient  History is limited by: N/A     History of Present Illness:  Patient is a 27 y.o.  female that presents to the emergency department with a 1 week history of chest pain, abdominal pain, and shortness of breath.  Patient reports the pain is intermittent.  She describes as sharp and stabbing.  Patient states the pain starts across her upper chest goes from left to right.  Then it goes down the middle of her chest into her abdomen.  She reports being nauseated.  She denies any fevers or chills.  She denies a cough or wheeze.  She does report that last night she fell more nauseated than normal and also felt lightheaded.  Subsequently, she presents to the ER for evaluation.    HPI    Patient Care Team  Primary Care Provider: Paulina Gale APRN    Past Medical History:     Allergies   Allergen Reactions   • Ciprofloxacin Rash   • Doxycycline Hyclate Rash   • Doxycycline Monohydrate Rash   • Latex Rash   • Sulfa Antibiotics Rash   • Sulfamethoxazole-Trimethoprim Rash and Hives   • Trimethoprim Rash     Past Medical History:   Diagnosis Date   • Anxiety    • Asthma    • Clostridium difficile diarrhea 01/30/2022   • GERD (gastroesophageal reflux disease)    • Kidney stones    • Migraine    • Ovarian cyst     right ovary    • PVC (premature ventricular contraction)      Past Surgical History:   Procedure Laterality Date   • COLONOSCOPY     • ENDOSCOPY     • KIDNEY STONE SURGERY  11/2021   • URETEROSCOPY LASER LITHOTRIPSY WITH STENT INSERTION Left 11/13/2021    Procedure: URETEROSCOPY LASER LITHOTRIPSY WITH STENT INSERTION STONE BASKET EXTRACTION;  Surgeon: Colt Sanders MD;  Location: [facility] ([facility]);  Service: Urology;  Laterality: Left;     Family History   Problem Relation Age of Onset   • Heart failure Mother    • Colon cancer Maternal Grandfather        Home  Medications:  Prior to Admission medications    Medication Sig Start Date End Date Taking? Authorizing Provider   albuterol (PROAIR RESPICLICK) 108 (90 Base) MCG/ACT inhaler Inhale Every 4 (Four) Hours As Needed for Wheezing.    Emergency, Nurse Epic, RN   bisoprolol (ZEBeta) 5 MG tablet Take 2.5 mg by mouth Daily.    Provider, MD Eric   brompheniramine-pseudoephedrine-DM 30-2-10 MG/5ML syrup Take 10 mL by mouth 4 (Four) Times a Day As Needed for Allergies. 3/18/22   Mónica Ronquillo APRN   famotidine (Pepcid) 20 MG tablet Take 1 tablet by mouth 2 (Two) Times a Day for 30 days. 5/9/22 6/8/22  Deyanira Dumont APRN   ondansetron ODT (ZOFRAN-ODT) 4 MG disintegrating tablet Place 1 tablet on the tongue Every 8 (Eight) Hours As Needed for Nausea or Vomiting. 2/26/22   Alba Aguilar APRN   PEG-KCl-NaCl-NaSulf-Na Asc-C (Plenvu) 140 g reconstituted solution solution Take 140 g by mouth 2 (Two) Times a Day for 1 day. Take per office instructions 5/9/22 5/10/22  Deyanira Dumont APRN   Probiotic Product (Probiotic-10 Ultimate) capsule Take  by mouth.    Provider, MD Eric   fluticasone (FLONASE) 50 MCG/ACT nasal spray 2 sprays into the nostril(s) as directed by provider Daily. 7/25/21 9/19/21  Rosio Mccoy APRN        Social History:   Social History     Tobacco Use   • Smoking status: Passive Smoke Exposure - Never Smoker   • Smokeless tobacco: Never Used   • Tobacco comment: secondhand smoke exposure   Vaping Use   • Vaping Use: Never used   Substance Use Topics   • Alcohol use: No   • Drug use: No       Review of Systems:  Review of Systems   Constitutional: Negative for chills and fever.   HENT: Negative for congestion, ear pain and sore throat.    Eyes: Negative for pain.   Respiratory: Negative for cough, chest tightness and shortness of breath.    Cardiovascular: Positive for chest pain.   Gastrointestinal: Positive for abdominal pain and nausea. Negative for diarrhea and  "vomiting.   Genitourinary: Negative for flank pain and hematuria.   Musculoskeletal: Negative for joint swelling.   Skin: Negative for pallor.   Neurological: Positive for light-headedness. Negative for seizures and headaches.   All other systems reviewed and are negative.         Physical Exam:  BP 91/67   Pulse 64   Temp 99 °F (37.2 °C) (Oral)   Resp 16   Ht 162.6 cm (64\")   Wt 116 kg (255 lb 11.7 oz)   LMP 03/17/2022 (Approximate)   SpO2 94%   BMI 43.90 kg/m²     Physical Exam Vital signs were reviewed under triage note.  General appearance - Patient appears well-developed and well-nourished.  Patient is in no acute distress.  Head - Normocephalic, atraumatic.  Pupils - Equal, round, reactive to light.  Extraocular muscles are intact.  Conjunctive is clear.  Nasal - Normal inspection.  No evidence of trauma or epistaxis.  Tympanic membranes - Gray, intact without erythema or retractions.  Oral mucosa - Pink and moist without lesions or erythema.  Uvula is midline.  Chest wall - Atraumatic.  Chest wall is tender in the left upper aspect of the chest wall..  There is no vesicular rashes noted.  Neck - Supple.  Trachea was midline.  There is no palpable lymphadenopathy or thyromegaly.  There are no meningeal signs  Lungs - Clear to auscultation and percussion bilaterally.  Heart - Regular rate and rhythm without any murmurs, clicks, or gallops.  Abdomen - Soft.  Bowel sounds are present.  There is mild palpable tenderness in the left lower quadrant area.  There is no rebound, guarding, or rigidity.  There are no palpable masses.  There are no pulsatile masses.  Back - Spine is straight and midline.  There is no CVA tenderness.  Extremities - Intact x4 with full range of motion.  There is no palpable edema.  Pulses are intact x4 and equal.  Neurologic - Patient is awake, alert, and oriented x3.  Cranial nerves II through XII are grossly intact.  Motor and sensory functions grossly intact.  Cerebellar function " was normal.  Integument - There are no rashes.  There are no petechia or purpura lesions noted.  There are no vesicular lesions noted.            Medications in the Emergency Department:  Medications   ketorolac (TORADOL) injection 60 mg (60 mg Intramuscular Given 5/10/22 1332)        Labs  Lab Results (last 24 hours)     Procedure Component Value Units Date/Time    Urinalysis With Microscopic If Indicated (No Culture) - Urine, Clean Catch [[id number]]  (Abnormal) Collected: 05/10/22 1134    Specimen: Urine, Clean Catch Updated: 05/10/22 1211     Color, UA Yellow     Appearance, UA Turbid     pH, UA 6.5     Specific Gravity, UA 1.019     Glucose, UA Negative     Ketones, UA Negative     Bilirubin, UA Negative     Blood, UA Moderate (2+)     Protein, UA Negative     Leuk Esterase, UA Moderate (2+)     Nitrite, UA Negative     Urobilinogen, UA 0.2 E.U./dL    Urinalysis, Microscopic Only - Urine, Clean Catch [[id number]]  (Abnormal) Collected: 05/10/22 1134    Specimen: Urine, Clean Catch Updated: 05/10/22 1211     RBC, UA 6-12 /HPF      WBC, UA 13-20 /HPF      Bacteria, UA 1+ /HPF      Squamous Epithelial Cells, UA 7-12 /HPF      Transitional Epithelial Cells, UA 3-6 /HPF      Hyaline Casts, UA None Seen /LPF      Methodology Manual Light Microscopy    CBC & Differential [[id number]]  (Abnormal) Collected: 05/10/22 1242    Specimen: Blood Updated: 05/10/22 1249    Narrative:      The following orders were created for panel order CBC & Differential.  Procedure                               Abnormality         Status                     ---------                               -----------         ------                     CBC Auto Differential[[id number]]        Abnormal            Final result                 Please view results for these tests on the individual orders.    Comprehensive Metabolic Panel [[id number]] Collected: 05/10/22 1242    Specimen: Blood Updated: 05/10/22 1308     Glucose 90 mg/dL      BUN 8 mg/dL       Creatinine 0.90 mg/dL      Sodium 137 mmol/L      Potassium 4.1 mmol/L      Chloride 102 mmol/L      CO2 25.3 mmol/L      Calcium 9.5 mg/dL      Total Protein 7.2 g/dL      Albumin 4.50 g/dL      ALT (SGPT) 17 U/L      AST (SGOT) 16 U/L      Alkaline Phosphatase 106 U/L      Total Bilirubin 0.4 mg/dL      Globulin 2.7 gm/dL      A/G Ratio 1.7 g/dL      BUN/Creatinine Ratio 8.9     Anion Gap 9.7 mmol/L      eGFR 91.2 mL/min/1.73      Comment: National Kidney Foundation and American Society of Nephrology (ASN) Task Force recommended calculation based on the Chronic Kidney Disease Epidemiology Collaboration (CKD-EPI) equation refit without adjustment for race.       Narrative:      GFR Normal >60  Chronic Kidney Disease <60  Kidney Failure <15      Lipase [[id number]]  (Normal) Collected: 05/10/22 1242    Specimen: Blood Updated: 05/10/22 1308     Lipase 23 U/L     hCG, Quantitative, Pregnancy [[id number]] Collected: 05/10/22 1242    Specimen: Blood Updated: 05/10/22 1306     HCG Quantitative <0.50 mIU/mL     Narrative:      HCG Ranges by Gestational Age    Females - non-pregnant premenopausal   </= 1mIU/mL HCG  Females - postmenopausal               </= 7mIU/mL HCG    3 Weeks       5.4   -      72 mIU/mL  4 Weeks      10.2   -     708 mIU/mL  5 Weeks       217   -   8,245 mIU/mL  6 Weeks       152   -  32,177 mIU/mL  7 Weeks     4,059   - 153,767 mIU/mL  8 Weeks    31,366   - 149,094 mIU/mL  9 Weeks    59,109   - 135,901 mIU/mL  10 Weeks   44,186   - 170,409 mIU/mL  12 Weeks   27,107   - 201,615 mIU/mL  14 Weeks   24,302   -  93,646 mIU/mL  15 Weeks   12,540   -  69,747 mIU/mL  16 Weeks    8,904   -  55,332 mIU/mL  17 Weeks    8,240   -  51,793 mIU/mL  18 Weeks    9,649   -  55,271 mIU/mL    Results may be falsely decreased if patient taking Biotin.      CBC Auto Differential [[id number]]  (Abnormal) Collected: 05/10/22 1242    Specimen: Blood Updated: 05/10/22 1249     WBC 8.74 10*3/mm3      RBC 5.32 10*6/mm3       Hemoglobin 16.2 g/dL      Hematocrit 48.4 %      MCV 91.0 fL      MCH 30.5 pg      MCHC 33.5 g/dL      RDW 11.9 %      RDW-SD 39.5 fl      MPV 10.2 fL      Platelets 242 10*3/mm3      Neutrophil % 64.0 %      Lymphocyte % 28.9 %      Monocyte % 5.1 %      Eosinophil % 1.0 %      Basophil % 0.8 %      Immature Grans % 0.2 %      Neutrophils, Absolute 5.58 10*3/mm3      Lymphocytes, Absolute 2.53 10*3/mm3      Monocytes, Absolute 0.45 10*3/mm3      Eosinophils, Absolute 0.09 10*3/mm3      Basophils, Absolute 0.07 10*3/mm3      Immature Grans, Absolute 0.02 10*3/mm3      nRBC 0.0 /100 WBC            Imaging:  XR Abdomen 2+ VW with Chest 1 VW    Result Date: 5/10/2022  PROCEDURE: XR ABDOMEN 2+ VIEWS W CHEST 1 VW  COMPARISON: [facility], CR, XR CHEST 1 VW, 9/24/2021, 21:31.  INDICATIONS: evaluate for sbo/CHEST PAIN WITH NAUSEA/VOMITING  FINDINGS:  Chest:  Low lung volumes.  Heart size and pulmonary vessels likely normal accounting for projection and lung volumes.  Lungs clear.  Costophrenic angles sharp.  No free subdiaphragmatic air.  Flat and upright abdomen:  Bowel gas pattern within normal limits.  No intestinal distension demonstrated.  No suspicious calcifications.  T-shaped IUD noted in the pelvis         1. No active cardiopulmonary disease  2. No evidence of bowel obstruction     HUMZA DILLARD MD       Electronically Signed and Approved By: HUMZA DILLARD MD on 5/10/2022 at 12:34                EKG:      Procedures:  Procedures    Progress                      The patient was seen and evaluated the ED by me.  The above history and physical examination was performed as document.  Diagnostic data was obtained.  Results reviewed.  Discussed with the patient.  Patient's urinalysis came back with bacteriuria.  Patient be treated empirically.  Patient otherwise reports that she is feeling better still for discharge home.      Medical Decision Making:  MDM     Final diagnoses:   Acute UTI   Atypical  chest pain        Disposition:  ED Disposition     ED Disposition   Discharge    Condition   Stable    Comment   --              Khurram Domínguez DO  05/11/22 0850       Khurram Domínguez DO  05/11/22 0850

## 2022-05-16 ENCOUNTER — TELEPHONE (OUTPATIENT)
Dept: GASTROENTEROLOGY | Facility: CLINIC | Age: 27
End: 2022-05-16

## 2022-05-16 DIAGNOSIS — R19.7 DIARRHEA, UNSPECIFIED TYPE: Primary | ICD-10-CM

## 2022-05-16 NOTE — TELEPHONE ENCOUNTER
I called pt back about sx she was still having. No answer but left message for pt to call office back.

## 2022-05-16 NOTE — TELEPHONE ENCOUNTER
S/w pt. She is agreeable to checking stool studies. I have placed stool kit up front for her to p/u.

## 2022-05-16 NOTE — TELEPHONE ENCOUNTER
I saw patient was in ER recently and treated for UTI.  We need to check a C. difficile study due to her history of C. difficile

## 2022-05-16 NOTE — TELEPHONE ENCOUNTER
After taking the Keflex, Pt having diarrhea,  Started yesterday   abd pain all over also started yesterday   Last OV: 5.9.22    Please advise

## 2022-05-18 ENCOUNTER — OFFICE VISIT (OUTPATIENT)
Dept: OBSTETRICS AND GYNECOLOGY | Facility: CLINIC | Age: 27
End: 2022-05-18

## 2022-05-18 VITALS
WEIGHT: 257 LBS | HEART RATE: 73 BPM | SYSTOLIC BLOOD PRESSURE: 126 MMHG | BODY MASS INDEX: 44.11 KG/M2 | DIASTOLIC BLOOD PRESSURE: 80 MMHG

## 2022-05-18 DIAGNOSIS — N76.0 ACUTE VAGINITIS: ICD-10-CM

## 2022-05-18 DIAGNOSIS — Z30.431 ENCOUNTER FOR ROUTINE CHECKING OF INTRAUTERINE CONTRACEPTIVE DEVICE (IUD): Primary | ICD-10-CM

## 2022-05-18 LAB
CANDIDA SPECIES: NEGATIVE
GARDNERELLA VAGINALIS: NEGATIVE
T VAGINALIS DNA VAG QL PROBE+SIG AMP: NEGATIVE

## 2022-05-18 PROCEDURE — 99212 OFFICE O/P EST SF 10 MIN: CPT | Performed by: OBSTETRICS & GYNECOLOGY

## 2022-05-18 PROCEDURE — 87480 CANDIDA DNA DIR PROBE: CPT | Performed by: OBSTETRICS & GYNECOLOGY

## 2022-05-18 PROCEDURE — 87660 TRICHOMONAS VAGIN DIR PROBE: CPT | Performed by: OBSTETRICS & GYNECOLOGY

## 2022-05-18 PROCEDURE — 87510 GARDNER VAG DNA DIR PROBE: CPT | Performed by: OBSTETRICS & GYNECOLOGY

## 2022-05-18 NOTE — PROGRESS NOTES
GYN Problem/Follow Up Visit    Chief Complaint   Patient presents with   • FOUR WEEK IUD CHECK           HPI  Kinsey Suresh is a 27 y.o. female, , who presents for iud check. Had mirena inserted four weeks ago. States she has been having some bleeding and cramping ever since. Mild. Also has noticed a vaginal odor since being treated for a uti recently.        Additional OB/GYN History   No LMP recorded. Patient has had an implant.  Allergies : Ciprofloxacin, Doxycycline hyclate, Doxycycline monohydrate, Latex, Sulfa antibiotics, Sulfamethoxazole-trimethoprim, and Trimethoprim     The additional following portions of the patient's history were reviewed and updated as appropriate: allergies, current medications, past family history, past medical history, past social history, past surgical history and problem list.    Review of Systems    I have reviewed and agree with the HPI, ROS, and historical information as entered above. Felicita Edward, APRN    Objective   /80   Pulse 73   Wt 117 kg (257 lb)   BMI 44.11 kg/m²     Physical Exam  Vitals reviewed.   Genitourinary:     General: Normal vulva.      Vagina: No signs of injury and foreign body. Bleeding (scant) present. No tenderness or lesions.      Cervix: Normal.      Uterus: Normal.       Adnexa: Right adnexa normal and left adnexa normal.      Comments: iud strings present at 2 cm  Skin:     General: Skin is warm and dry.   Neurological:      Mental Status: She is alert and oriented to person, place, and time.            Assessment and Plan    Diagnoses and all orders for this visit:    1. Encounter for routine checking of intrauterine contraceptive device (IUD) (Primary)    2. Acute vaginitis  -     Gardnerella vaginalis, Trichomonas vaginalis, Candida albicans, DNA - Swab, Vagina    will check for infections. Discussed that mild bleeding and cramping are common right after an iud. Offered iud removal but pt wants to give it more time and see if  her sx improve. rto if any concerns.     Counseling:  She understands the importance of having the above orders performed in a timely fashion.  She is encouraged to review her results online and/or contact or office if she has questions.     Follow Up:  Return if symptoms worsen or fail to improve.      Felicita Edward, MICHEAL  05/18/2022

## 2022-06-02 RX ORDER — FAMOTIDINE 20 MG/1
TABLET, FILM COATED ORAL
Qty: 60 TABLET | Refills: 1 | Status: SHIPPED | OUTPATIENT
Start: 2022-06-02 | End: 2022-07-06

## 2022-06-02 NOTE — TELEPHONE ENCOUNTER
Rx Request pepcid 20mg tablet 30 day supply     Last OV: 5.9.22  Last Rx'd: 5.10.22    Has colon on 7.11.22

## 2022-06-06 ENCOUNTER — OFFICE VISIT (OUTPATIENT)
Dept: OBSTETRICS AND GYNECOLOGY | Facility: CLINIC | Age: 27
End: 2022-06-06

## 2022-06-06 VITALS
HEART RATE: 94 BPM | SYSTOLIC BLOOD PRESSURE: 108 MMHG | BODY MASS INDEX: 43.64 KG/M2 | HEIGHT: 64 IN | WEIGHT: 255.6 LBS | DIASTOLIC BLOOD PRESSURE: 72 MMHG

## 2022-06-06 DIAGNOSIS — Z11.3 SCREEN FOR STD (SEXUALLY TRANSMITTED DISEASE): ICD-10-CM

## 2022-06-06 DIAGNOSIS — L30.9 NIPPLE DERMATITIS: ICD-10-CM

## 2022-06-06 DIAGNOSIS — Z01.419 WELL WOMAN EXAM: Primary | ICD-10-CM

## 2022-06-06 LAB
C TRACH RRNA CVX QL NAA+PROBE: NOT DETECTED
CANDIDA SPECIES: NEGATIVE
GARDNERELLA VAGINALIS: NEGATIVE
HBV SURFACE AG SERPL QL IA: NORMAL
HCV AB SER DONR QL: NORMAL
HIV1+2 AB SER QL: NORMAL
N GONORRHOEA RRNA SPEC QL NAA+PROBE: NOT DETECTED
T VAGINALIS DNA VAG QL PROBE+SIG AMP: NEGATIVE

## 2022-06-06 PROCEDURE — 99395 PREV VISIT EST AGE 18-39: CPT | Performed by: NURSE PRACTITIONER

## 2022-06-06 PROCEDURE — 99213 OFFICE O/P EST LOW 20 MIN: CPT | Performed by: NURSE PRACTITIONER

## 2022-06-06 PROCEDURE — 87491 CHLMYD TRACH DNA AMP PROBE: CPT | Performed by: NURSE PRACTITIONER

## 2022-06-06 PROCEDURE — 86803 HEPATITIS C AB TEST: CPT | Performed by: NURSE PRACTITIONER

## 2022-06-06 PROCEDURE — 87510 GARDNER VAG DNA DIR PROBE: CPT | Performed by: NURSE PRACTITIONER

## 2022-06-06 PROCEDURE — 86592 SYPHILIS TEST NON-TREP QUAL: CPT | Performed by: NURSE PRACTITIONER

## 2022-06-06 PROCEDURE — 87591 N.GONORRHOEAE DNA AMP PROB: CPT | Performed by: NURSE PRACTITIONER

## 2022-06-06 PROCEDURE — 87340 HEPATITIS B SURFACE AG IA: CPT | Performed by: NURSE PRACTITIONER

## 2022-06-06 PROCEDURE — G0432 EIA HIV-1/HIV-2 SCREEN: HCPCS | Performed by: NURSE PRACTITIONER

## 2022-06-06 PROCEDURE — 87480 CANDIDA DNA DIR PROBE: CPT | Performed by: NURSE PRACTITIONER

## 2022-06-06 PROCEDURE — 87660 TRICHOMONAS VAGIN DIR PROBE: CPT | Performed by: NURSE PRACTITIONER

## 2022-06-06 RX ORDER — VENLAFAXINE HYDROCHLORIDE 37.5 MG/1
37.5 CAPSULE, EXTENDED RELEASE ORAL DAILY
COMMUNITY
Start: 2022-05-31 | End: 2022-11-20

## 2022-06-06 RX ORDER — NYSTATIN AND TRIAMCINOLONE ACETONIDE 100000; 1 [USP'U]/G; MG/G
1 OINTMENT TOPICAL 2 TIMES DAILY
Qty: 30 G | Refills: 1 | Status: SHIPPED | OUTPATIENT
Start: 2022-06-06 | End: 2022-06-20

## 2022-06-06 NOTE — PROGRESS NOTES
"  HPI:   27 y.o..Presents for well woman exam. Contraception or HRT: Contraception:  Mirena IUD  Menses:   Spotting daily since Mirena placed 2022, pink to brown color, change products every 4-6 hours on heaviest days  Pain:  Mild, OTC meds control discomfort  Last pap normal, 5  Complaints: bleeding s/p Mirena IUD, itchy nipples, constant, intermittent redness,  using dove soap  Desires std screen    Past Medical History:   Diagnosis Date   • Anxiety    • Asthma    • Clostridium difficile diarrhea 2022   • GERD (gastroesophageal reflux disease)    • Kidney stones    • Migraine without aura    • Ovarian cyst     right ovary    • PVC (premature ventricular contraction)       Past Surgical History:   Procedure Laterality Date   • COLONOSCOPY     • ENDOSCOPY     • KIDNEY STONE SURGERY  2021   • URETEROSCOPY LASER LITHOTRIPSY WITH STENT INSERTION Left 2021    Procedure: URETEROSCOPY LASER LITHOTRIPSY WITH STENT INSERTION STONE BASKET EXTRACTION;  Surgeon: Colt Sanders MD;  Location: [facility];  Service: Urology;  Laterality: Left;      Family History   Problem Relation Age of Onset   • Heart failure Mother    • Skin cancer Maternal Grandmother    • Colon cancer Maternal Grandfather    • Breast cancer Other 60   • Ovarian cancer Neg Hx    • Uterine cancer Neg Hx    • Prostate cancer Neg Hx         PCP: does manage PMHx and preventative labs    PHYSICAL EXAM: Chaperone present /72   Pulse 94   Ht 162.6 cm (64\")   Wt 116 kg (255 lb 9.6 oz)   BMI 43.87 kg/m²   General- NAD, alert and oriented, appropriate  Psych- Normal mood, good memory  Neck- No masses, no thyroid enlargement  Lymphatic- No palpable neck, axillary, or groin nodes  CV- Regular rhythm, no murmurs  Resp- CTA to bases, no wheezes  Abdomen- Soft, non distended, non tender, no masses  Breast left-  Bilaterally symmetrical, no masses, non tender, no nipple discharge  Breast right- Bilaterally " symmetrical, no masses, non tender, no nipple discharge  External genitalia- Normal female, no lesions  Urethra/meatus- Normal, no masses, non tender, no prolapse  Bladder- Normal, no masses, non tender, no prolapse  Vagina- Normal, no atrophy, no lesions, no discharge, no prolapse  Cvx- Normal, no lesions, no discharge, No cervical motion tenderness, IUD strings visable 2cm  Uterus- Normal size, shape & consistency.  Non tender, mobile, & no prolapse  Adnexa- No mass, non tender  Anus/Rectum/Perineum- Not performed  Ext- No edema, no cyanosis    Skin- No lesions, no rashes, no acanthosis nigricans       ASSESSMENT and PLAN:    Diagnoses and all orders for this visit:    1. Well woman exam (Primary)    2. Screen for STD (sexually transmitted disease)  -     RPR, Rfx Qn RPR / Confirm TP  -     Hepatitis B Surface Antigen  -     Hepatitis C Antibody  -     HIV-1 / O / 2 Ag / Antibody 4th Generation  -     Chlamydia trachomatis, Neisseria gonorrhoeae, PCR - Swab, Cervix  -     Gardnerella vaginalis, Trichomonas vaginalis, Candida albicans, DNA - Swab, Vagina    3. Nipple dermatitis  -     nystatin-triamcinolone (MYCOLOG) 878794-0.1 UNIT/GM-% ointment; Apply 1 application topically to the appropriate area as directed 2 (Two) Times a Day for 14 days.  Dispense: 30 g; Refill: 1      Preventative:   BREAST HEALTH- Monthly self breast exam importance and how to reviewed. MMG and/or MRI (prn) reviewed per society guidelines and her individual history. Screen: Not medically needed  CERVICAL CANCER Screening- Reviewed current ASCCP guidelines for screening w and wo cotest HPV, age specific.  Screen: Already up to date  COLON CANCER Screening- Reviewed current medical society guidelines and options.  Screen:  Already up to date  SEXUAL HEALTH: STD screening recommended.  Ordered  Follow up PCP/Specialist PMHx and Labs  Myriad: Does not qualify.  TRACK MENSES, RTO if <q21d, >7d long, heavy or painful.    SAFE SEX/condoms  importance reviewed.    Desires expectant management for spotting s/p Mirena IUD      She understands the importance of having any ordered tests to be performed in a timely fashion.  The risks of not performing them include, but are not limited to, advanced cancer stages, bone loss from osteoporosis and/or subsequent increase in morbidity and/or mortality.  She is encouraged to review her results online and/or contact or office if she has questions.     Follow Up:  Return in about 1 year (around 6/6/2023).        Alaina Decker, MICHEAL  06/06/2022

## 2022-06-08 LAB — RPR SER QL: NON REACTIVE

## 2022-06-25 PROCEDURE — 87086 URINE CULTURE/COLONY COUNT: CPT | Performed by: NURSE PRACTITIONER

## 2022-06-26 ENCOUNTER — TELEPHONE (OUTPATIENT)
Dept: URGENT CARE | Facility: CLINIC | Age: 27
End: 2022-06-26

## 2022-06-26 NOTE — TELEPHONE ENCOUNTER
----- Message from Tod Carl MD sent at 6/26/2022  6:02 PM EDT -----  Please call the patient regarding negative urine culture - if still symptomatic see primary

## 2022-06-27 NOTE — PROGRESS NOTES
"Chief Complaint  Consult (Bilateral Breast Reduction)    Subjective          History of Present Illness  Kinsey Suresh is a 27 y.o. female who presents to [facility] as a consult from MICHEAL Castro and would like to discuss breast reduction.  Her current bra size is a 42Fcup.  She would like to be a D cup.  She patient does have a family history of breast cancer in her maternal great aunt.  Neck, shoulders, and upper back pain present for 10 years.  Conservative treatments of chiropractor, with little or temporary relief.  Experiences rashes, treats with baby powder and Nystatin/Triamcinolone cream.  Trouble sleeping, cannot get comfortable.  Trouble exercising, cannot do activities that she would like to do, generally has to wear many sports bras and has to special order bras.  Has a hard time bending over due to breast pain and upper neck pulling. Recommendations for today of breast reduction.    Allergies: Ciprofloxacin, Doxycycline hyclate, Doxycycline monohydrate, Latex, Sulfa antibiotics, Sulfamethoxazole-trimethoprim, and Trimethoprim  Allergies Reconciled.    Review of Systems  All system were reviewed and were negative, except the ones noted above.     Review of Systems     Objective     /80 (BP Location: Left arm, Patient Position: Sitting)   Pulse 93   Temp 97.8 °F (36.6 °C) (Temporal)   Ht 162.6 cm (64\")   Wt 118 kg (259 lb 9.6 oz)   BMI 44.56 kg/m²     Body mass index is 44.56 kg/m².    Physical Exam      Patient awake, alert, oriented  Respirations are non elaborated  Patient is not tachycardic    Breast exam: Has visible redness and hyperpigmentation under breast over IMF, bilateral large breast with left breast larger than right, bilateral shoulder grooving  Masses: No masses  Nipple Discharge: No nipple discharge  Breast Symmetry: left breast larger than right  Axillary Lymphadenopathy: No axillary lymphadenopathy  SN-N (Right " Breast): 34  SN-N (Left Breast):35  SN-IMF (Right Breast):11  SN-IMF (Left Breast):14  N-IMF (Right Breast):13  N-IMF (Left Breast):13    Schnur Scale Score:  895  Body surface area is 2.19 meters squared.      Result Review :                Assessment and Plan      Diagnoses and all orders for this visit:    1. Macromastia (Primary)             Plan:  RECON bilateral breast reductin 3 hours  History of staph skin infection and history of C-diff infection.   • The patient was given literature in regards to the procedure and encouraged to visit the websites of ASPS and ASAPS.  • Pictures obtained.  • We discussed the procedure for bilateral breast reduction under general anesthesia as well as the postoperative recover time and the need for limitation of activities.  The risks of surgery were discussed including bleeding, infection, scarring (for which diagrams were drawn), pain, poor healing, loss of skin and or nipple, change in nipple sensation, inability to breast feed, asymmetry, no guarantee of post-operative cup size.    • I think that this patient is an excellent candidate for a breast reduction.  If feel that this procedure is medically necessary to relieve her symptoms.  I anticipate we will remove at least 895 grams of tissue from each breast.     • This patient has my office number to call with any further questions.         Consent: bilateral breast reduction    CPT  81133-01- breast reduction    Follow Up     No follow-ups on file.    Patient was given instructions and counseling regarding her condition. Please see specific information pulled into the AVS if appropriate.     Yanna Comer, MICHEAL  06/29/2022

## 2022-06-29 ENCOUNTER — PREP FOR SURGERY (OUTPATIENT)
Dept: OTHER | Facility: HOSPITAL | Age: 27
End: 2022-06-29

## 2022-06-29 ENCOUNTER — PATIENT ROUNDING (BHMG ONLY) (OUTPATIENT)
Dept: PLASTIC SURGERY | Facility: CLINIC | Age: 27
End: 2022-06-29

## 2022-06-29 ENCOUNTER — OFFICE VISIT (OUTPATIENT)
Dept: PLASTIC SURGERY | Facility: CLINIC | Age: 27
End: 2022-06-29

## 2022-06-29 VITALS
SYSTOLIC BLOOD PRESSURE: 114 MMHG | DIASTOLIC BLOOD PRESSURE: 80 MMHG | WEIGHT: 259.6 LBS | HEART RATE: 93 BPM | TEMPERATURE: 97.8 F | BODY MASS INDEX: 44.32 KG/M2 | HEIGHT: 64 IN

## 2022-06-29 DIAGNOSIS — N62 MACROMASTIA: Primary | ICD-10-CM

## 2022-06-29 PROCEDURE — 99214 OFFICE O/P EST MOD 30 MIN: CPT | Performed by: NURSE PRACTITIONER

## 2022-06-29 RX ORDER — CEFAZOLIN SODIUM 2 G/100ML
2 INJECTION, SOLUTION INTRAVENOUS ONCE
Status: CANCELLED | OUTPATIENT
Start: 2022-06-29 | End: 2022-06-29

## 2022-06-29 NOTE — PROGRESS NOTES
A [employer] message has been sent to the patient for PATIENT ROUNDING with [facility].

## 2022-06-30 ENCOUNTER — HOSPITAL ENCOUNTER (OUTPATIENT)
Dept: CT IMAGING | Facility: HOSPITAL | Age: 27
Discharge: HOME | End: 2022-06-30
Admitting: NURSE PRACTITIONER

## 2022-06-30 DIAGNOSIS — R10.84 GENERALIZED ABDOMINAL PAIN: ICD-10-CM

## 2022-06-30 DIAGNOSIS — R93.3 ABNORMAL CT SCAN, GASTROINTESTINAL TRACT: ICD-10-CM

## 2022-06-30 PROCEDURE — 74177 CT ABD & PELVIS W/CONTRAST: CPT

## 2022-06-30 PROCEDURE — 0 IOPAMIDOL PER 1 ML: Performed by: NURSE PRACTITIONER

## 2022-06-30 RX ADMIN — IOPAMIDOL 100 ML: 755 INJECTION, SOLUTION INTRAVENOUS at 10:57

## 2022-07-06 RX ORDER — FAMOTIDINE 20 MG/1
TABLET, FILM COATED ORAL
Qty: 60 TABLET | Refills: 1 | Status: SHIPPED | OUTPATIENT
Start: 2022-07-06

## 2022-07-11 ENCOUNTER — HOSPITAL ENCOUNTER (OUTPATIENT)
Facility: HOSPITAL | Age: 27
Setting detail: HOSPITAL OUTPATIENT SURGERY
Discharge: HOME | End: 2022-07-11
Attending: INTERNAL MEDICINE | Admitting: INTERNAL MEDICINE

## 2022-07-11 ENCOUNTER — ANESTHESIA (OUTPATIENT)
Dept: GASTROENTEROLOGY | Facility: HOSPITAL | Age: 27
End: 2022-07-11

## 2022-07-11 ENCOUNTER — ANESTHESIA EVENT (OUTPATIENT)
Dept: GASTROENTEROLOGY | Facility: HOSPITAL | Age: 27
End: 2022-07-11

## 2022-07-11 VITALS
WEIGHT: 250.44 LBS | RESPIRATION RATE: 21 BRPM | DIASTOLIC BLOOD PRESSURE: 73 MMHG | OXYGEN SATURATION: 96 % | SYSTOLIC BLOOD PRESSURE: 102 MMHG | HEART RATE: 101 BPM | BODY MASS INDEX: 42.76 KG/M2 | HEIGHT: 64 IN | TEMPERATURE: 97.9 F

## 2022-07-11 DIAGNOSIS — R93.3 ABNORMAL CT SCAN, COLON: ICD-10-CM

## 2022-07-11 DIAGNOSIS — Z86.19 HISTORY OF CLOSTRIDIOIDES DIFFICILE INFECTION: ICD-10-CM

## 2022-07-11 DIAGNOSIS — R10.84 GENERALIZED ABDOMINAL PAIN: ICD-10-CM

## 2022-07-11 LAB — B-HCG UR QL: NEGATIVE

## 2022-07-11 PROCEDURE — 88305 TISSUE EXAM BY PATHOLOGIST: CPT | Performed by: INTERNAL MEDICINE

## 2022-07-11 PROCEDURE — 81025 URINE PREGNANCY TEST: CPT | Performed by: INTERNAL MEDICINE

## 2022-07-11 PROCEDURE — 45380 COLONOSCOPY AND BIOPSY: CPT | Performed by: INTERNAL MEDICINE

## 2022-07-11 PROCEDURE — 25010000002 PROPOFOL 10 MG/ML EMULSION: Performed by: NURSE ANESTHETIST, CERTIFIED REGISTERED

## 2022-07-11 RX ORDER — PROPOFOL 10 MG/ML
VIAL (ML) INTRAVENOUS AS NEEDED
Status: DISCONTINUED | OUTPATIENT
Start: 2022-07-11 | End: 2022-07-11 | Stop reason: SURG

## 2022-07-11 RX ORDER — LIDOCAINE HYDROCHLORIDE 20 MG/ML
INJECTION, SOLUTION EPIDURAL; INFILTRATION; INTRACAUDAL; PERINEURAL AS NEEDED
Status: DISCONTINUED | OUTPATIENT
Start: 2022-07-11 | End: 2022-07-11 | Stop reason: SURG

## 2022-07-11 RX ORDER — SODIUM CHLORIDE, SODIUM LACTATE, POTASSIUM CHLORIDE, CALCIUM CHLORIDE 600; 310; 30; 20 MG/100ML; MG/100ML; MG/100ML; MG/100ML
30 INJECTION, SOLUTION INTRAVENOUS CONTINUOUS
Status: DISCONTINUED | OUTPATIENT
Start: 2022-07-11 | End: 2022-07-11 | Stop reason: HOSPADM

## 2022-07-11 RX ORDER — DEXMEDETOMIDINE HYDROCHLORIDE 100 UG/ML
INJECTION, SOLUTION INTRAVENOUS AS NEEDED
Status: DISCONTINUED | OUTPATIENT
Start: 2022-07-11 | End: 2022-07-11 | Stop reason: SURG

## 2022-07-11 RX ADMIN — LIDOCAINE HYDROCHLORIDE 100 MG: 20 INJECTION, SOLUTION EPIDURAL; INFILTRATION; INTRACAUDAL; PERINEURAL at 12:55

## 2022-07-11 RX ADMIN — SODIUM CHLORIDE, POTASSIUM CHLORIDE, SODIUM LACTATE AND CALCIUM CHLORIDE 30 ML/HR: 600; 310; 30; 20 INJECTION, SOLUTION INTRAVENOUS at 12:22

## 2022-07-11 RX ADMIN — PROPOFOL 250 MCG/KG/MIN: 10 INJECTION, EMULSION INTRAVENOUS at 12:55

## 2022-07-11 RX ADMIN — PROPOFOL 100 MG: 10 INJECTION, EMULSION INTRAVENOUS at 12:55

## 2022-07-11 RX ADMIN — DEXMEDETOMIDINE HYDROCHLORIDE 20 MCG: 100 INJECTION, SOLUTION, CONCENTRATE INTRAVENOUS at 13:07

## 2022-07-11 NOTE — ANESTHESIA PREPROCEDURE EVALUATION
Anesthesia Evaluation     Patient summary reviewed and Nursing notes reviewed   no history of anesthetic complications:  NPO Solid Status: > 8 hours  NPO Liquid Status: > 2 hours           Airway   Mallampati: II  TM distance: >3 FB  Neck ROM: full  No difficulty expected  Dental      Pulmonary - normal exam    breath sounds clear to auscultation  (+) asthma,sleep apnea,   Cardiovascular - negative cardio ROS and normal exam  Exercise tolerance: good (4-7 METS)    Rhythm: regular  Rate: normal        Neuro/Psych  (+) headaches, psychiatric history,    GI/Hepatic/Renal/Endo    (+)  GERD,  renal disease,     Musculoskeletal (-) negative ROS    Abdominal    Substance History - negative use     OB/GYN negative ob/gyn ROS         Other - negative ROS                       Anesthesia Plan    ASA 3     general     (Total IV Anesthesia    Patient understands anesthesia not responsible for dental damage.  )  intravenous induction     Anesthetic plan, risks, benefits, and alternatives have been provided, discussed and informed consent has been obtained with: patient.    Plan discussed with CRNA.        CODE STATUS:

## 2022-07-11 NOTE — H&P
Pre Procedure History & Physical    Chief Complaint:   Diarrhea, C. difficile infection in the past, abnormal CT scan of the colon    Subjective     HPI:   Abnormal CT scan of the colon    Past Medical History:   Past Medical History:   Diagnosis Date   • Anxiety    • Asthma    • Clostridium difficile diarrhea 01/30/2022   • GERD (gastroesophageal reflux disease)    • Kidney stones    • Migraine without aura    • Ovarian cyst     right ovary    • PVC (premature ventricular contraction)    • Sleep apnea        Past Surgical History:  Past Surgical History:   Procedure Laterality Date   • COLONOSCOPY  2020   • ENDOSCOPY     • KIDNEY STONE SURGERY  11/2021   • URETEROSCOPY LASER LITHOTRIPSY WITH STENT INSERTION Left 11/13/2021    Procedure: URETEROSCOPY LASER LITHOTRIPSY WITH STENT INSERTION STONE BASKET EXTRACTION;  Surgeon: Colt Sanders MD;  Location: [facility];  Service: Urology;  Laterality: Left;       Family History:  Family History   Problem Relation Age of Onset   • Heart failure Mother    • Skin cancer Maternal Grandmother    • Colon cancer Maternal Grandfather    • Breast cancer Other 60   • Heart disease Maternal Great-Grandfather    • Ovarian cancer Neg Hx    • Uterine cancer Neg Hx    • Prostate cancer Neg Hx        Social History:   reports that she is a non-smoker but has been exposed to tobacco smoke. She has been exposed to tobacco smoke for the past 17.00 years. She has never used smokeless tobacco. She reports that she does not drink alcohol and does not use drugs.    Medications:   Medications Prior to Admission   Medication Sig Dispense Refill Last Dose   • venlafaxine XR (EFFEXOR-XR) 37.5 MG 24 hr capsule Take 37.5 mg by mouth Daily.   Past Month at Unknown time   • albuterol (PROAIR RESPICLICK) 108 (90 Base) MCG/ACT inhaler Inhale Every 4 (Four) Hours As Needed for Wheezing.   More than a month at Unknown time   • bisoprolol (ZEBeta) 5 MG tablet Take 2.5 mg by mouth Daily.   More  "than a month at Unknown time   • brompheniramine-pseudoephedrine-DM 30-2-10 MG/5ML syrup Take 10 mL by mouth 4 (Four) Times a Day As Needed for Allergies. 200 mL 0    • cephalexin (KEFLEX) 500 MG capsule Take 1 capsule by mouth 4 (Four) Times a Day. 20 capsule 0    • famotidine (PEPCID) 20 MG tablet TAKE 1 TABLET BY MOUTH 2 TIMES A DAY 60 tablet 1 More than a month at Unknown time   • nitrofurantoin, macrocrystal-monohydrate, (MACROBID) 100 MG capsule Take 1 capsule by mouth 2 (Two) Times a Day. 14 capsule 0    • ondansetron ODT (ZOFRAN-ODT) 4 MG disintegrating tablet Place 1 tablet on the tongue Every 8 (Eight) Hours As Needed for Nausea or Vomiting. 20 tablet 0    • Probiotic Product (Probiotic-10 Ultimate) capsule Take  by mouth.          Allergies:  Ciprofloxacin, Doxycycline hyclate, Doxycycline monohydrate, Latex, Sulfa antibiotics, Sulfamethoxazole-trimethoprim, and Trimethoprim        Objective     Blood pressure 124/98, pulse 98, temperature 97.5 °F (36.4 °C), temperature source Temporal, resp. rate 16, height 162.6 cm (64\"), weight 114 kg (250 lb 7.1 oz), SpO2 97 %, not currently breastfeeding.    Physical Exam   Constitutional: Pt is oriented to person, place, and time and well-developed, well-nourished, and in no distress.   Mouth/Throat: Oropharynx is clear and moist.   Neck: Normal range of motion.   Cardiovascular: Normal rate, regular rhythm and normal heart sounds.    Pulmonary/Chest: Effort normal and breath sounds normal.   Abdominal: Soft. Nontender  Skin: Skin is warm and dry.   Psychiatric: Mood, memory, affect and judgment normal.     Assessment & Plan     Diagnosis:  Abnormal CT scan of the colon    Anticipated Surgical Procedure:  Colonoscopy    The risks, benefits, and alternatives of this procedure have been discussed with the patient or the responsible party- the patient understands and agrees to proceed.            "

## 2022-07-11 NOTE — ANESTHESIA POSTPROCEDURE EVALUATION
Patient: Kinsey Suresh    Procedure Summary     Date: 07/11/22 Room / Location: [facility] ENDOSCOPY 4 / [facility] ENDOSCOPY    Anesthesia Start: 1254 Anesthesia Stop: 1325    Procedure: COLONOSCOPY BIOPSY (N/A ) Diagnosis:       Abnormal CT scan, colon      Generalized abdominal pain      History of Clostridioides difficile infection      (Abnormal CT scan, colon [R93.3])      (Generalized abdominal pain [R10.84])      (History of Clostridioides difficile infection [Z86.19])    Surgeons: David Yip MD Provider: Josafat Aranda MD    Anesthesia Type: general ASA Status: 3          Anesthesia Type: general    Vitals  Vitals Value Taken Time   /73 07/11/22 1345   Temp 36.6 °C (97.9 °F) 07/11/22 1345   Pulse 101 07/11/22 1345   Resp 21 07/11/22 1345   SpO2 96 % 07/11/22 1345           Post Anesthesia Care and Evaluation    Patient location during evaluation: bedside  Patient participation: complete - patient participated  Level of consciousness: awake  Pain management: adequate    Airway patency: patent  Anesthetic complications: No anesthetic complications  PONV Status: none  Cardiovascular status: acceptable and stable  Respiratory status: acceptable  Hydration status: acceptable    Comments: An Anesthesiologist personally participated in the most demanding procedures (including induction and emergence if applicable) in the anesthesia plan, monitored the course of anesthesia administration at frequent intervals and remained physically present and available for immediate diagnosis and treatment of emergencies.

## 2022-07-13 LAB
CYTO UR: NORMAL
LAB AP CASE REPORT: NORMAL
LAB AP CLINICAL INFORMATION: NORMAL
PATH REPORT.FINAL DX SPEC: NORMAL
PATH REPORT.GROSS SPEC: NORMAL

## 2022-07-20 ENCOUNTER — TELEPHONE (OUTPATIENT)
Dept: PLASTIC SURGERY | Facility: CLINIC | Age: 27
End: 2022-07-20

## 2022-07-20 NOTE — TELEPHONE ENCOUNTER
RETURNED CALL REGARDING VOICEMAIL LEFT ABOUT WHETHER INSURANCE WOULD COVER THE PROCEDURE THAT SHE IS LOOKING TO HAVE. ADVISED THAT WE WOULD NOT BE ABLE TO PROVIDE ANY INFORMATION ON THIS UNTIL SHE WAS PROVIDED A SURGERY DATE AND WE ARE WITHIN THE INSURANCE REQUIRED TIMEFRAME TO SUBMIT FOR PRIOR AUTHORIZATION. I DID OFFER TO PROVIDE THE CPT CODES IF SHE WOULD LIKE TO CONTACT HER INSURANCE COMPANY TO SEE IF IT IS COVERED BENEFIT WITH HER PLAN, SHE DECLINED. ADVISED THAT THE SURGERY SCHEDULER WILL CONTACT HER ONCE WE HAVE GOTTEN TO HER NAME THE SURGERY SCHEDULING LIST.

## 2022-08-25 PROCEDURE — U0004 COV-19 TEST NON-CDC HGH THRU: HCPCS | Performed by: NURSE PRACTITIONER

## 2022-08-26 ENCOUNTER — TELEPHONE (OUTPATIENT)
Dept: URGENT CARE | Facility: CLINIC | Age: 27
End: 2022-08-26

## 2022-08-26 NOTE — TELEPHONE ENCOUNTER
----- Message from Tod Carl MD sent at 8/26/2022 12:24 AM EDT -----  Please call the patient regarding negative covid

## 2022-09-19 ENCOUNTER — OFFICE VISIT (OUTPATIENT)
Dept: OBSTETRICS AND GYNECOLOGY | Facility: CLINIC | Age: 27
End: 2022-09-19

## 2022-09-19 VITALS
BODY MASS INDEX: 45.24 KG/M2 | WEIGHT: 265 LBS | DIASTOLIC BLOOD PRESSURE: 78 MMHG | HEART RATE: 104 BPM | HEIGHT: 64 IN | SYSTOLIC BLOOD PRESSURE: 120 MMHG

## 2022-09-19 DIAGNOSIS — N89.8 VAGINAL ODOR: ICD-10-CM

## 2022-09-19 DIAGNOSIS — R10.2 PELVIC PAIN IN FEMALE: Primary | ICD-10-CM

## 2022-09-19 PROCEDURE — 87510 GARDNER VAG DNA DIR PROBE: CPT | Performed by: OBSTETRICS & GYNECOLOGY

## 2022-09-19 PROCEDURE — 87109 MYCOPLASMA: CPT | Performed by: OBSTETRICS & GYNECOLOGY

## 2022-09-19 PROCEDURE — 87081 CULTURE SCREEN ONLY: CPT | Performed by: OBSTETRICS & GYNECOLOGY

## 2022-09-19 PROCEDURE — 87660 TRICHOMONAS VAGIN DIR PROBE: CPT | Performed by: OBSTETRICS & GYNECOLOGY

## 2022-09-19 PROCEDURE — 87480 CANDIDA DNA DIR PROBE: CPT | Performed by: OBSTETRICS & GYNECOLOGY

## 2022-09-19 PROCEDURE — 99213 OFFICE O/P EST LOW 20 MIN: CPT | Performed by: OBSTETRICS & GYNECOLOGY

## 2022-09-19 NOTE — PROGRESS NOTES
"GYN Problem/Follow Up Visit    Chief Complaint   Patient presents with   • IUD CHECK           HPI  Kinsey Suresh is a 27 y.o. female, , who presents for bilat pelvic pain for a couple of months. States it is sharp and intermittent but also feels like a pressure. Had normal colonoscopy two months ago. Also has noticed a vaginal odor recently. Swabs for std and bv have been negative. Denies vaginal d/c. Denies new sex partners. Has mirena. No menses with it.        Additional OB/GYN History   No LMP recorded. Patient has had an implant.  Current contraception: contraceptive methods: IUD.  Insertion date:   Desires to: continue contraception  Allergies : Ciprofloxacin, Doxycycline hyclate, Doxycycline monohydrate, Latex, Sulfa antibiotics, Sulfamethoxazole-trimethoprim, and Trimethoprim     The additional following portions of the patient's history were reviewed and updated as appropriate: allergies, current medications, past family history, past medical history, past social history, past surgical history and problem list.    Review of Systems    I have reviewed and agree with the HPI, ROS, and historical information as entered above. Felicita Edward, APRN    Objective   /78   Pulse 104   Ht 162.6 cm (64\")   Wt 120 kg (265 lb)   BMI 45.49 kg/m²     Physical Exam  Vitals reviewed.   Genitourinary:     General: Normal vulva.      Vagina: No signs of injury and foreign body. Vaginal discharge (scant yellowish d/c) and tenderness present. No erythema, bleeding, lesions or prolapsed vaginal walls.      Cervix: Discharge present. No cervical motion tenderness, friability, lesion, erythema or cervical bleeding.      Uterus: Normal. Not tender.       Adnexa: Right adnexa normal.        Right: No tenderness.          Left: Tenderness (mild) present.       Comments: iud strings present at 2 cm  Skin:     General: Skin is warm and dry.   Neurological:      Mental Status: She is alert and oriented to person, " place, and time.            Assessment and Plan    Diagnoses and all orders for this visit:    1. Pelvic pain in female (Primary)  -     US Non-ob Transvaginal; Future  -     Gardnerella vaginalis, Trichomonas vaginalis, Candida albicans, DNA - Swab, Vagina  -     Group B Streptococcus Culture - Swab, Vagina  -     Mycoplasma / Ureaplasma Culture - Swab, Cervix    2. Vaginal odor  -     Gardnerella vaginalis, Trichomonas vaginalis, Candida albicans, DNA - Swab, Vagina  -     Group B Streptococcus Culture - Swab, Vagina  -     Mycoplasma / Ureaplasma Culture - Swab, Cervix    will check for infections. Will check pelvic u/s. Happy with mirena and desires to continue for now. F/u after u/s. If workup is normal recommend seeing pcp for eval.     Counseling:  She understands the importance of having the above orders performed in a timely fashion.  She is encouraged to review her results online and/or contact or office if she has questions.     Follow Up:  Return for u/s f/u.      MICHEAL Saleem  09/19/2022

## 2022-09-21 LAB — BACTERIA SPEC AEROBE CULT: NORMAL

## 2022-09-22 LAB
M HOMINIS SPEC QL CULT: POSITIVE
U UREALYTICUM SPEC QL CULT: POSITIVE

## 2022-09-22 RX ORDER — FLUCONAZOLE 150 MG/1
150 TABLET ORAL
Qty: 2 TABLET | Refills: 0 | Status: SHIPPED | OUTPATIENT
Start: 2022-09-22 | End: 2022-09-26

## 2022-09-22 RX ORDER — CLINDAMYCIN HYDROCHLORIDE 300 MG/1
300 CAPSULE ORAL 2 TIMES DAILY
Qty: 14 CAPSULE | Refills: 0 | Status: SHIPPED | OUTPATIENT
Start: 2022-09-22 | End: 2022-09-29

## 2022-09-26 NOTE — PROGRESS NOTES
Discussed swab results with patient. Patient is aware that prescriptions was sent to pharmacy for treatment.Discussed the recommendation that partner needs treatment.

## 2022-10-10 ENCOUNTER — HOSPITAL ENCOUNTER (OUTPATIENT)
Dept: ULTRASOUND IMAGING | Facility: HOSPITAL | Age: 27
Discharge: HOME | End: 2022-10-10
Admitting: OBSTETRICS & GYNECOLOGY

## 2022-10-10 DIAGNOSIS — R10.2 PELVIC PAIN IN FEMALE: ICD-10-CM

## 2022-10-10 PROCEDURE — 76830 TRANSVAGINAL US NON-OB: CPT

## 2022-10-20 ENCOUNTER — OFFICE VISIT (OUTPATIENT)
Dept: OBSTETRICS AND GYNECOLOGY | Facility: CLINIC | Age: 27
End: 2022-10-20

## 2022-10-20 VITALS
WEIGHT: 269 LBS | DIASTOLIC BLOOD PRESSURE: 81 MMHG | HEIGHT: 64 IN | BODY MASS INDEX: 45.93 KG/M2 | HEART RATE: 84 BPM | SYSTOLIC BLOOD PRESSURE: 124 MMHG

## 2022-10-20 DIAGNOSIS — N83.201 RIGHT OVARIAN CYST: ICD-10-CM

## 2022-10-20 DIAGNOSIS — N89.8 VAGINAL ODOR: ICD-10-CM

## 2022-10-20 DIAGNOSIS — R10.2 PELVIC PAIN IN FEMALE: ICD-10-CM

## 2022-10-20 DIAGNOSIS — N89.8 VAGINAL ITCHING: ICD-10-CM

## 2022-10-20 DIAGNOSIS — N89.8 VAGINAL DISCHARGE: Primary | ICD-10-CM

## 2022-10-20 DIAGNOSIS — N30.00 ACUTE CYSTITIS WITHOUT HEMATURIA: ICD-10-CM

## 2022-10-20 LAB
BILIRUB BLD-MCNC: NEGATIVE MG/DL
CANDIDA SPECIES: NEGATIVE
GARDNERELLA VAGINALIS: NEGATIVE
GLUCOSE UR STRIP-MCNC: NEGATIVE MG/DL
KETONES UR QL: NEGATIVE
LEUKOCYTE EST, POC: ABNORMAL
NITRITE UR-MCNC: NEGATIVE MG/ML
PH UR: 6 [PH] (ref 5–8)
PROT UR STRIP-MCNC: NEGATIVE MG/DL
RBC # UR STRIP: NEGATIVE /UL
SP GR UR: 1.03 (ref 1–1.03)
T VAGINALIS DNA VAG QL PROBE+SIG AMP: NEGATIVE
UROBILINOGEN UR QL: NORMAL

## 2022-10-20 PROCEDURE — 87109 MYCOPLASMA: CPT | Performed by: OBSTETRICS & GYNECOLOGY

## 2022-10-20 PROCEDURE — 99213 OFFICE O/P EST LOW 20 MIN: CPT | Performed by: OBSTETRICS & GYNECOLOGY

## 2022-10-20 PROCEDURE — 87510 GARDNER VAG DNA DIR PROBE: CPT | Performed by: OBSTETRICS & GYNECOLOGY

## 2022-10-20 PROCEDURE — 87086 URINE CULTURE/COLONY COUNT: CPT | Performed by: OBSTETRICS & GYNECOLOGY

## 2022-10-20 PROCEDURE — 87660 TRICHOMONAS VAGIN DIR PROBE: CPT | Performed by: OBSTETRICS & GYNECOLOGY

## 2022-10-20 PROCEDURE — 87480 CANDIDA DNA DIR PROBE: CPT | Performed by: OBSTETRICS & GYNECOLOGY

## 2022-10-20 RX ORDER — NITROFURANTOIN 25; 75 MG/1; MG/1
100 CAPSULE ORAL 2 TIMES DAILY
Qty: 10 CAPSULE | Refills: 0 | Status: SHIPPED | OUTPATIENT
Start: 2022-10-20 | End: 2022-10-25

## 2022-10-20 NOTE — PROGRESS NOTES
"GYN Problem/Follow Up Visit    Chief Complaint   Patient presents with   • LEXI AND DISCUSS ULTRA SOUND RESULTS           HPI  Kinsey Suresh is a 27 y.o. female, , who presents for lexi. Recently dx with mycoplasma and ureaplasma. Took meds and partner was treated. They have been sexually active since tx. pt states still having vaginal d/c, itching, and odor. Also has been having right sided pelvic pain so she had an u/s. Today she states the pain is intermittent. Also c/o burning with urination and low back pain and feels like she might have a uti.        Additional OB/GYN History   No LMP recorded. Patient has had an implant.  Current contraception: contraceptive methods: IUD.  Insertion date:   Desires to: continue contraception  Allergies : Ciprofloxacin, Doxycycline hyclate, Doxycycline monohydrate, Latex, Sulfa antibiotics, Sulfamethoxazole-trimethoprim, and Trimethoprim     The additional following portions of the patient's history were reviewed and updated as appropriate: allergies, current medications, past family history, past medical history, past social history, past surgical history and problem list.    Review of Systems    I have reviewed and agree with the HPI, ROS, and historical information as entered above. Felicita Edward, APRN    Objective   /81   Pulse 84   Ht 162.6 cm (64\")   Wt 122 kg (269 lb)   BMI 46.17 kg/m²     Physical Exam  Vitals reviewed.   Genitourinary:     General: Normal vulva.      Vagina: No signs of injury and foreign body. Vaginal discharge (thick white/yellow) and erythema present. No tenderness, bleeding, lesions or prolapsed vaginal walls.      Cervix: Discharge and erythema present. No cervical motion tenderness, friability, lesion or cervical bleeding.      Comments: iud strings present  Skin:     General: Skin is warm and dry.   Neurological:      Mental Status: She is alert and oriented to person, place, and time.            Assessment and " Plan    Diagnoses and all orders for this visit:    1. Vaginal discharge (Primary)  -     Gardnerella vaginalis, Trichomonas vaginalis, Candida albicans, DNA - Swab, Vagina  -     Mycoplasma / Ureaplasma Culture - Swab, Cervix    2. Vaginal odor  -     Gardnerella vaginalis, Trichomonas vaginalis, Candida albicans, DNA - Swab, Vagina  -     Mycoplasma / Ureaplasma Culture - Swab, Cervix    3. Vaginal itching  -     Gardnerella vaginalis, Trichomonas vaginalis, Candida albicans, DNA - Swab, Vagina  -     Mycoplasma / Ureaplasma Culture - Swab, Cervix    4. Pelvic pain in female  -     Gardnerella vaginalis, Trichomonas vaginalis, Candida albicans, DNA - Swab, Vagina  -     Mycoplasma / Ureaplasma Culture - Swab, Cervix    5. Right ovarian cyst    6. Acute cystitis without hematuria  -     POC Urinalysis Dipstick  -     nitrofurantoin, macrocrystal-monohydrate, (Macrobid) 100 MG capsule; Take 1 capsule by mouth 2 (Two) Times a Day for 5 days.  Dispense: 10 capsule; Refill: 0  -     Urine Culture - Urine, Urine, Clean Catch    will check for infections. Reviewed pelvic u/s done 10/10/22: iud in good position, 1.6 cm right ovarian cyst. Discussed monitoring her pain/cyst and f/u if sx persist. Happy with iud and desires to continue. Reviewed urine multistick done in office today: 3+ leuk. Will send urine for culture and treat with macrobid. Increase fluids. rto prn.     Counseling:  She understands the importance of having the above orders performed in a timely fashion.  She is encouraged to review her results online and/or contact or office if she has questions.     Follow Up:  Return if symptoms worsen or fail to improve.      Felicita Edward, APRN  10/20/2022

## 2022-10-21 LAB — BACTERIA SPEC AEROBE CULT: NO GROWTH

## 2022-10-24 RX ORDER — AZITHROMYCIN 250 MG/1
TABLET, FILM COATED ORAL
Qty: 6 TABLET | Refills: 0 | Status: SHIPPED | OUTPATIENT
Start: 2022-10-24 | End: 2022-10-29

## 2022-10-25 NOTE — PROGRESS NOTES
Discussed results with patient. Patient is aware that a prescription was sent into her pharmacy for treatment and to abstain from intercourse or use condom until negative test of cure. Patient is aware that her partner needs treatment. Patient is scheduled for a test of cure in 4-6 weeks.

## 2022-10-27 LAB
M HOMINIS SPEC QL CULT: NEGATIVE
U UREALYTICUM SPEC QL CULT: POSITIVE

## 2022-11-07 ENCOUNTER — TELEPHONE (OUTPATIENT)
Dept: GASTROENTEROLOGY | Facility: CLINIC | Age: 27
End: 2022-11-07

## 2022-11-07 NOTE — TELEPHONE ENCOUNTER
Called pt on stool study she is no longer having the symptoms will cancel the order for now pt will if any thing changes

## 2022-11-23 ENCOUNTER — TELEPHONE (OUTPATIENT)
Dept: PLASTIC SURGERY | Facility: CLINIC | Age: 27
End: 2022-11-23

## 2022-11-28 ENCOUNTER — APPOINTMENT (OUTPATIENT)
Dept: CT IMAGING | Facility: HOSPITAL | Age: 27
End: 2022-11-28

## 2022-12-05 ENCOUNTER — OFFICE VISIT (OUTPATIENT)
Dept: OBSTETRICS AND GYNECOLOGY | Facility: CLINIC | Age: 27
End: 2022-12-05

## 2022-12-05 VITALS
HEIGHT: 64 IN | DIASTOLIC BLOOD PRESSURE: 73 MMHG | HEART RATE: 71 BPM | WEIGHT: 268 LBS | SYSTOLIC BLOOD PRESSURE: 123 MMHG | BODY MASS INDEX: 45.75 KG/M2

## 2022-12-05 DIAGNOSIS — N89.8 VAGINAL DISCHARGE: Primary | ICD-10-CM

## 2022-12-05 DIAGNOSIS — N89.8 VAGINAL ITCHING: ICD-10-CM

## 2022-12-05 DIAGNOSIS — Z22.39 CARRIER OF UREAPLASMA UREALYTICUM: ICD-10-CM

## 2022-12-05 DIAGNOSIS — N89.8 VAGINAL ODOR: ICD-10-CM

## 2022-12-05 PROCEDURE — 87660 TRICHOMONAS VAGIN DIR PROBE: CPT | Performed by: OBSTETRICS & GYNECOLOGY

## 2022-12-05 PROCEDURE — 87480 CANDIDA DNA DIR PROBE: CPT | Performed by: OBSTETRICS & GYNECOLOGY

## 2022-12-05 PROCEDURE — 87109 MYCOPLASMA: CPT | Performed by: OBSTETRICS & GYNECOLOGY

## 2022-12-05 PROCEDURE — 99213 OFFICE O/P EST LOW 20 MIN: CPT | Performed by: OBSTETRICS & GYNECOLOGY

## 2022-12-05 PROCEDURE — 87510 GARDNER VAG DNA DIR PROBE: CPT | Performed by: OBSTETRICS & GYNECOLOGY

## 2022-12-05 NOTE — PROGRESS NOTES
"GYN Problem/Follow Up Visit    Chief Complaint   Patient presents with   • TEST OF CURE           HPI  Kinsey Suresh is a 27 y.o. female, , who presents for joann. Recently tested positive for ureaplasma. States took meds and partner was treated. Having some itching and has noticed an odor. No other concerns voiced.        Additional OB/GYN History   No LMP recorded (lmp unknown). Patient has had an implant.  Current contraception: contraceptive methods: IUD.  Insertion date:   Desires to: continue contraception  Allergies : Ciprofloxacin, Doxycycline hyclate, Doxycycline monohydrate, Latex, Sulfa antibiotics, Sulfamethoxazole-trimethoprim, and Trimethoprim     The additional following portions of the patient's history were reviewed and updated as appropriate: allergies, current medications, past family history, past medical history, past social history, past surgical history and problem list.    Review of Systems    I have reviewed and agree with the HPI, ROS, and historical information as entered above. Felicita Edward, APRN    Objective   /73   Pulse 71   Ht 162.6 cm (64\")   Wt 122 kg (268 lb)   LMP  (LMP Unknown) Comment: IUD  BMI 46.00 kg/m²     Physical Exam  Vitals reviewed.   Genitourinary:     General: Normal vulva.      Vagina: No signs of injury and foreign body. Vaginal discharge (clear) present. No erythema, tenderness, bleeding, lesions or prolapsed vaginal walls.      Cervix: Discharge present. No cervical motion tenderness, friability, lesion, erythema or cervical bleeding.      Comments: iud strings present at 1 cm  Skin:     General: Skin is warm and dry.   Neurological:      Mental Status: She is alert and oriented to person, place, and time.            Assessment and Plan    Diagnoses and all orders for this visit:    1. Vaginal discharge (Primary)  -     Gardnerella vaginalis, Trichomonas vaginalis, Candida albicans, DNA - Swab, Vagina  -     Mycoplasma / Ureaplasma Culture - " Swab, Cervix    2. Vaginal odor  -     Gardnerella vaginalis, Trichomonas vaginalis, Candida albicans, DNA - Swab, Vagina  -     Mycoplasma / Ureaplasma Culture - Swab, Cervix    3. Vaginal itching  -     Gardnerella vaginalis, Trichomonas vaginalis, Candida albicans, DNA - Swab, Vagina  -     Mycoplasma / Ureaplasma Culture - Swab, Cervix    4. Carrier of ureaplasma urealyticum  -     Mycoplasma / Ureaplasma Culture - Swab, Cervix    will check vag panel and also joann swab. If sx persist will discuss possible removal of iud since these sx have been recurrent since mirena was inserted. She will f/u prn.     Counseling:  She understands the importance of having the above orders performed in a timely fashion.  She is encouraged to review her results online and/or contact or office if she has questions.     Follow Up:  Return if symptoms worsen or fail to improve.      Felicita Edward, APRN  12/05/2022   No

## 2022-12-08 NOTE — PROGRESS NOTES
"Chief Complaint  Consult (Bilateral breast reduction )    Subjective          History of Present Illness  Kinsey Suresh is a 27 y.o. female who presents to [facility] as a consult from MICHEAL Castro and would like to discuss breast reduction.  Her current bra size is a 42Fcup.  She would like to be a small C cup.  Patient does have a family history of breast cancer: (maternal great aunt).  Neck, shoulders, and upper back pain present for 10 years.  Conservative treatments of chiropractor, with little or temporary relief.  Experiences rashes, treats with baby powder and Nystatin/Triamcinolone cream.  Trouble sleeping, cannot get comfortable.  Trouble exercising, cannot do activities that she would like to do, generally has to wear many sports bras and has to special order bras.  Has a hard time bending over due to breast pain and upper neck pulling. Recommendations for today of breast reduction.  She does have history of Staph Infection. Has no children, but is unsure if she wants children.  She understands she will be unable to breast-feed after this procedure.    She is wanting surgery soon and here to meet with Dr. Spencer.      Allergies: Ciprofloxacin, Doxycycline hyclate, Doxycycline monohydrate, Latex, Sulfa antibiotics, Sulfamethoxazole-trimethoprim, and Trimethoprim  Allergies Reconciled.    Review of Systems  All system were reviewed and were negative, except the ones noted above.     Review of Systems     Objective     /81 (BP Location: Right arm, Patient Position: Sitting)   Pulse 76   Temp 98.4 °F (36.9 °C) (Temporal)   Ht 162.6 cm (64\")   Wt 119 kg (263 lb 6.4 oz)   SpO2 97%   BMI 45.21 kg/m²     Body mass index is 45.21 kg/m².    Physical Exam    Patient awake, alert, oriented  Respirations are non elaborated  Patient is not tachycardic    Breast exam: Has visible redness and hyperpigmentation under breast over IMF, bilateral " large breast with left breast larger than right, bilateral shoulder grooving. IMF has a slope on left breast.  Masses: No masses  Nipple Discharge: No nipple discharge  Breast Symmetry: left breast larger than right  Axillary Lymphadenopathy: No axillary lymphadenopathy  SN-N (Right Breast): 34  SN-N (Left Breast): 35  SN-IMF (Right Breast): 11  SN-IMF (Left Breast): 14  Base width: (Right Breast): 13  Base width: (Left Breast): 13    Schnur Scale Score:  895  Body surface area is 2.2 meters squared.      Result Review :   Patient was noted to have inferolateral slant to her inframammary fold bilaterally.  Presently this is not noticeable because her breast are so large.  However after her reduction, the sloping inframammary fold will become noticeable.  It would appear that her breast are sagging inferiorly on the lateral aspect.  Manipulation and modification of the inframammary fold is not possible with this procedure.    The patient has a prominent axillary roll.  She understands this is not breast tissue.  The reduction of this will not be a covered part of the breast reduction.  This can be addressed through either liposuction (a cosmetic procedure) or weight loss.  The liposuction can be performed at the same time as the breast reduction.      Assessment and Plan    Removal of breast tissue:  Left breast: 900g  Right breast: 400g        Diagnoses and all orders for this visit:    1. Macromastia (Primary)    2. Chronic midline thoracic back pain      Plan:   History of staph skin infection and history of C-diff infection.   • The patient was given literature in regards to the procedure and encouraged to visit the websites of [employer] and [employer].  • Pictures obtained.  • We discussed the procedure for bilateral breast reduction under general anesthesia as well as the postoperative recover time and the need for limitation of activities.  The risks of surgery were discussed including bleeding, infection, scarring (for  which diagrams were drawn), pain, poor healing, loss of skin and or nipple, change in nipple sensation, inability to breast feed, asymmetry, no guarantee of post-operative cup size.    • I think that this patient is an excellent candidate for a breast reduction.  If feel that this procedure is medically necessary to relieve her symptoms.  I anticipate we will remove at least 900 grams of tissue from left breast and 400 grams off of right breast.     • This patient has my office number to call with any further questions.     Scribed by Bailee Meneses MA, acting as a scribe for Luis Spencer MD, 12/12/22 15:28 EST.  Luis Spencer MD's signature on the note affirms that the note adequately documents the care provided.      Patient was given instructions and counseling regarding her condition. Please see specific information pulled into the AVS if appropriate.     Luis Spencer MD  12/12/2022

## 2022-12-12 ENCOUNTER — TELEPHONE (OUTPATIENT)
Dept: OBSTETRICS AND GYNECOLOGY | Facility: CLINIC | Age: 27
End: 2022-12-12

## 2022-12-12 ENCOUNTER — OFFICE VISIT (OUTPATIENT)
Dept: PLASTIC SURGERY | Facility: CLINIC | Age: 27
End: 2022-12-12

## 2022-12-12 ENCOUNTER — PREP FOR SURGERY (OUTPATIENT)
Dept: OTHER | Facility: HOSPITAL | Age: 27
End: 2022-12-12

## 2022-12-12 VITALS
DIASTOLIC BLOOD PRESSURE: 81 MMHG | TEMPERATURE: 98.4 F | WEIGHT: 263.4 LBS | HEART RATE: 76 BPM | SYSTOLIC BLOOD PRESSURE: 133 MMHG | OXYGEN SATURATION: 97 % | HEIGHT: 64 IN | BODY MASS INDEX: 44.97 KG/M2

## 2022-12-12 DIAGNOSIS — G89.29 CHRONIC MIDLINE THORACIC BACK PAIN: ICD-10-CM

## 2022-12-12 DIAGNOSIS — N62 MACROMASTIA: Primary | ICD-10-CM

## 2022-12-12 DIAGNOSIS — M54.6 CHRONIC MIDLINE THORACIC BACK PAIN: ICD-10-CM

## 2022-12-12 PROCEDURE — 99214 OFFICE O/P EST MOD 30 MIN: CPT | Performed by: SURGERY

## 2022-12-12 RX ORDER — CEFAZOLIN SODIUM 2 G/100ML
2 INJECTION, SOLUTION INTRAVENOUS ONCE
Status: CANCELLED | OUTPATIENT
Start: 2022-12-12 | End: 2022-12-12

## 2022-12-12 RX ORDER — AZITHROMYCIN 250 MG/1
TABLET, FILM COATED ORAL
Qty: 6 TABLET | Refills: 0 | Status: SHIPPED | OUTPATIENT
Start: 2022-12-12 | End: 2022-12-17

## 2022-12-12 NOTE — TELEPHONE ENCOUNTER
Discussed swab results with patient. Patient is aware that another round of Zithromax was sent in to her pharmacy. Discussed with patient that we will try this treatment again since she is allergic to so many antibiotics. Our choices are very limited.

## 2022-12-12 NOTE — TELEPHONE ENCOUNTER
----- Message from MICHEAL Saleem sent at 12/12/2022 12:45 PM EST -----  I have sent in another round of zithromax. Please let pt know we will try this again since she is allergic to so many antibiotics. Our choices are very limited. Thanks

## 2022-12-13 LAB
M HOMINIS SPEC QL CULT: NEGATIVE
U UREALYTICUM SPEC QL CULT: POSITIVE

## 2022-12-30 ENCOUNTER — APPOINTMENT (OUTPATIENT)
Dept: GENERAL RADIOLOGY | Facility: HOSPITAL | Age: 27
End: 2022-12-30
Payer: COMMERCIAL

## 2022-12-30 ENCOUNTER — HOSPITAL ENCOUNTER (EMERGENCY)
Facility: HOSPITAL | Age: 27
Discharge: HOME | End: 2022-12-30
Attending: EMERGENCY MEDICINE | Admitting: EMERGENCY MEDICINE
Payer: COMMERCIAL

## 2022-12-30 VITALS
HEART RATE: 124 BPM | RESPIRATION RATE: 18 BRPM | WEIGHT: 263.23 LBS | DIASTOLIC BLOOD PRESSURE: 90 MMHG | HEIGHT: 64 IN | SYSTOLIC BLOOD PRESSURE: 130 MMHG | TEMPERATURE: 98.6 F | BODY MASS INDEX: 44.94 KG/M2 | OXYGEN SATURATION: 98 %

## 2022-12-30 DIAGNOSIS — J06.9 UPPER RESPIRATORY TRACT INFECTION, UNSPECIFIED TYPE: Primary | ICD-10-CM

## 2022-12-30 LAB — S PYO AG THROAT QL: NEGATIVE

## 2022-12-30 PROCEDURE — 99283 EMERGENCY DEPT VISIT LOW MDM: CPT

## 2022-12-30 PROCEDURE — 87880 STREP A ASSAY W/OPTIC: CPT | Performed by: EMERGENCY MEDICINE

## 2022-12-30 PROCEDURE — 71045 X-RAY EXAM CHEST 1 VIEW: CPT

## 2022-12-30 PROCEDURE — 87081 CULTURE SCREEN ONLY: CPT | Performed by: EMERGENCY MEDICINE

## 2022-12-30 NOTE — ED PROVIDER NOTES
Subjective   History of Present Illness  Patient is a 27-year-old female presenting today due to cough and fever that started Wednesday.  Patient states that Wednesday morning she woke up with a cough and that night she had a fever.  Patient states that her chest hurts with deep inspiration.  She went to her doctor's office yesterday where they swabbed her for COVID and flu both were negative, and they prescribed her Tamiflu and told her to get Mucinex DM over-the-counter which patient has and has been taking.  She denies recent travel.  Patient states that both her grandparents had the flu which she was around them on Christmas Eve.  She also admits to chills.    History provided by:  Patient   used: No        Review of Systems   Constitutional: Positive for chills and fever.   HENT: Negative.    Eyes: Negative.    Respiratory: Positive for cough and chest tightness.    Cardiovascular: Negative.    Gastrointestinal: Negative.    Endocrine: Negative.    Genitourinary: Negative.    Musculoskeletal: Negative.    Skin: Negative.    Allergic/Immunologic: Negative.    Neurological: Negative.    Hematological: Negative.    Psychiatric/Behavioral: Negative.        Past Medical History:   Diagnosis Date   • Anxiety    • Asthma    • Clostridium difficile diarrhea 01/30/2022   • GERD (gastroesophageal reflux disease)    • Kidney stones    • Migraine without aura    • Ovarian cyst     right ovary    • PVC (premature ventricular contraction)    • Sleep apnea        Allergies   Allergen Reactions   • Ciprofloxacin Rash   • Doxycycline Hyclate Rash   • Doxycycline Monohydrate Rash   • Latex Rash   • Sulfa Antibiotics Rash   • Sulfamethoxazole-Trimethoprim Rash and Hives   • Trimethoprim Rash       Past Surgical History:   Procedure Laterality Date   • COLONOSCOPY  2020   • COLONOSCOPY N/A 7/11/2022    Procedure: COLONOSCOPY BIOPSY;  Surgeon: David Yip MD;  Location: [facility];  Service:  Gastroenterology;  Laterality: N/A;  NORMAL COLON   • ENDOSCOPY     • KIDNEY STONE SURGERY  11/2021   • URETEROSCOPY LASER LITHOTRIPSY WITH STENT INSERTION Left 11/13/2021    Procedure: URETEROSCOPY LASER LITHOTRIPSY WITH STENT INSERTION STONE BASKET EXTRACTION;  Surgeon: Colt Sanders MD;  Location: [facility] MAIN OR;  Service: Urology;  Laterality: Left;       Family History   Problem Relation Age of Onset   • Heart failure Mother    • Skin cancer Maternal Grandmother    • Colon cancer Maternal Grandfather    • Heart failure Maternal Grandfather    • Heart disease Maternal Great-Grandfather    • Breast cancer Other 60   • Ovarian cancer Neg Hx    • Uterine cancer Neg Hx    • Prostate cancer Neg Hx        Social History     Socioeconomic History   • Marital status: Single   Tobacco Use   • Smoking status: Never     Passive exposure: Yes   • Smokeless tobacco: Never   • Tobacco comments:     secondhand smoke exposure   Vaping Use   • Vaping Use: Never used   Substance and Sexual Activity   • Alcohol use: No   • Drug use: No   • Sexual activity: Yes     Partners: Male     Birth control/protection: I.U.D.     Comment: Mirena 4-           Objective   Physical Exam  Vitals and nursing note reviewed.   Constitutional:       Appearance: Normal appearance. She is normal weight.   HENT:      Head: Normocephalic and atraumatic.      Nose: Nose normal.      Mouth/Throat:      Mouth: Mucous membranes are moist.   Eyes:      Extraocular Movements: Extraocular movements intact.      Conjunctiva/sclera: Conjunctivae normal.      Pupils: Pupils are equal, round, and reactive to light.   Cardiovascular:      Rate and Rhythm: Normal rate and regular rhythm.      Heart sounds: Normal heart sounds.   Pulmonary:      Effort: Pulmonary effort is normal.      Breath sounds: Normal breath sounds.   Musculoskeletal:         General: Normal range of motion.      Cervical back: Normal range of motion and neck supple.   Skin:      General: Skin is warm and dry.   Neurological:      General: No focal deficit present.      Mental Status: She is alert and oriented to person, place, and time.   Psychiatric:         Mood and Affect: Mood normal.         Behavior: Behavior normal.         Procedures           ED Course                                           Medical Decision Making  The patient presents to the ED with a cough. The patient is resting comfortably and feels better, is alert and in no distress.  On re-examination the patient does not appear toxic and has no meningeal signs (including a negative Kernig and Brudzinski sign), and there's no intractable vomiting, respiratory distress and no apparent pain. Based on the history, exam, diagnostic testing and reassessment, the patient has no signs of meningitis, significant pneumonia, pyelonephritis, sepsis or other acute serious bacterial infections, or other significant pathology to warrant further testing, continued ED treatment, admission or specialist evaluation. The patient's vital signs have been stable. The patient's condition is stable and is appropriate for discharge. The patient´s symptoms are consistent with a viral upper respiratory infection and antibiotics are not indicated. The () was counseled to return to the ED for re-evaluation for worsening cough, shortness of breath, uncontrollable headache, uncontrollable fever, altered mental status, or any symptoms which cause them concern. The patient will pursue further outpatient evaluation with the primary care physician or other designated or consultant physician as indicated in the discharge instructions.      Amount and/or Complexity of Data Reviewed  External Data Reviewed: radiology.     Details: No acute pulmonary processes noted  Labs: ordered.  Radiology: ordered and independent interpretation performed.      Risk  OTC drugs.      Critical Care  Total time providing critical care: < 30 minutes      Final diagnoses:    Upper respiratory tract infection, unspecified type       ED Disposition  ED Disposition     ED Disposition   Discharge    Condition   Stable    Comment   --             Morgan Galessyves Escamilla, APRN  [address] [address]  [phone]      If symptoms worsen         Medication List      No changes were made to your prescriptions during this visit.          Orlando Pizarro PA-C  12/30/22 [id number]

## 2022-12-30 NOTE — DISCHARGE INSTRUCTIONS
Drink plenty of fluids, rest, take OTC tylenol/ibuprofen as needed for headache/body aches/fever. Take your prescribed medications as directed

## 2023-01-01 LAB — BACTERIA SPEC AEROBE CULT: NORMAL

## 2023-01-12 ENCOUNTER — APPOINTMENT (OUTPATIENT)
Dept: CT IMAGING | Facility: HOSPITAL | Age: 28
End: 2023-01-12
Payer: COMMERCIAL

## 2023-03-05 ENCOUNTER — APPOINTMENT (OUTPATIENT)
Dept: CT IMAGING | Facility: HOSPITAL | Age: 28
End: 2023-03-05
Payer: COMMERCIAL

## 2023-03-05 ENCOUNTER — HOSPITAL ENCOUNTER (EMERGENCY)
Facility: HOSPITAL | Age: 28
Discharge: HOME | End: 2023-03-05
Attending: EMERGENCY MEDICINE | Admitting: EMERGENCY MEDICINE
Payer: COMMERCIAL

## 2023-03-05 VITALS
DIASTOLIC BLOOD PRESSURE: 100 MMHG | SYSTOLIC BLOOD PRESSURE: 129 MMHG | BODY MASS INDEX: 46.18 KG/M2 | HEIGHT: 64 IN | WEIGHT: 270.5 LBS | OXYGEN SATURATION: 97 % | HEART RATE: 102 BPM | TEMPERATURE: 98.1 F | RESPIRATION RATE: 18 BRPM

## 2023-03-05 DIAGNOSIS — N39.0 ACUTE UTI: Primary | ICD-10-CM

## 2023-03-05 LAB
ALBUMIN SERPL-MCNC: 4.3 G/DL (ref 3.5–5.2)
ALBUMIN/GLOB SERPL: 1.7 G/DL
ALP SERPL-CCNC: 127 U/L (ref 39–117)
ALT SERPL W P-5'-P-CCNC: 22 U/L (ref 1–33)
ANION GAP SERPL CALCULATED.3IONS-SCNC: 9.6 MMOL/L (ref 5–15)
AST SERPL-CCNC: 17 U/L (ref 1–32)
BACTERIA UR QL AUTO: ABNORMAL /HPF
BASOPHILS # BLD AUTO: 0.07 10*3/MM3 (ref 0–0.2)
BASOPHILS NFR BLD AUTO: 1 % (ref 0–1.5)
BILIRUB SERPL-MCNC: 0.3 MG/DL (ref 0–1.2)
BILIRUB UR QL STRIP: NEGATIVE
BUN SERPL-MCNC: 8 MG/DL (ref 6–20)
BUN/CREAT SERPL: 9.4 (ref 7–25)
CALCIUM SPEC-SCNC: 9.5 MG/DL (ref 8.6–10.5)
CHLORIDE SERPL-SCNC: 105 MMOL/L (ref 98–107)
CLARITY UR: ABNORMAL
CO2 SERPL-SCNC: 27.4 MMOL/L (ref 22–29)
COD CRY URNS QL: ABNORMAL /HPF
COLOR UR: YELLOW
CREAT SERPL-MCNC: 0.85 MG/DL (ref 0.57–1)
DEPRECATED RDW RBC AUTO: 41.1 FL (ref 37–54)
EGFRCR SERPLBLD CKD-EPI 2021: 97.6 ML/MIN/1.73
EOSINOPHIL # BLD AUTO: 0.1 10*3/MM3 (ref 0–0.4)
EOSINOPHIL NFR BLD AUTO: 1.4 % (ref 0.3–6.2)
ERYTHROCYTE [DISTWIDTH] IN BLOOD BY AUTOMATED COUNT: 12.6 % (ref 12.3–15.4)
GLOBULIN UR ELPH-MCNC: 2.6 GM/DL
GLUCOSE SERPL-MCNC: 108 MG/DL (ref 65–99)
GLUCOSE UR STRIP-MCNC: NEGATIVE MG/DL
HCG INTACT+B SERPL-ACNC: <0.5 MIU/ML
HCT VFR BLD AUTO: 43.8 % (ref 34–46.6)
HGB BLD-MCNC: 14.9 G/DL (ref 12–15.9)
HGB UR QL STRIP.AUTO: ABNORMAL
HOLD SPECIMEN: NORMAL
HOLD SPECIMEN: NORMAL
HYALINE CASTS UR QL AUTO: ABNORMAL /LPF
IMM GRANULOCYTES # BLD AUTO: 0.02 10*3/MM3 (ref 0–0.05)
IMM GRANULOCYTES NFR BLD AUTO: 0.3 % (ref 0–0.5)
KETONES UR QL STRIP: NEGATIVE
LEUKOCYTE ESTERASE UR QL STRIP.AUTO: ABNORMAL
LIPASE SERPL-CCNC: 29 U/L (ref 13–60)
LYMPHOCYTES # BLD AUTO: 2.39 10*3/MM3 (ref 0.7–3.1)
LYMPHOCYTES NFR BLD AUTO: 34.2 % (ref 19.6–45.3)
MCH RBC QN AUTO: 30 PG (ref 26.6–33)
MCHC RBC AUTO-ENTMCNC: 34 G/DL (ref 31.5–35.7)
MCV RBC AUTO: 88.3 FL (ref 79–97)
MONOCYTES # BLD AUTO: 0.42 10*3/MM3 (ref 0.1–0.9)
MONOCYTES NFR BLD AUTO: 6 % (ref 5–12)
NEUTROPHILS NFR BLD AUTO: 3.98 10*3/MM3 (ref 1.7–7)
NEUTROPHILS NFR BLD AUTO: 57.1 % (ref 42.7–76)
NITRITE UR QL STRIP: NEGATIVE
NRBC BLD AUTO-RTO: 0 /100 WBC (ref 0–0.2)
PH UR STRIP.AUTO: 6.5 [PH] (ref 5–8)
PLATELET # BLD AUTO: 251 10*3/MM3 (ref 140–450)
PMV BLD AUTO: 9.8 FL (ref 6–12)
POTASSIUM SERPL-SCNC: 3.9 MMOL/L (ref 3.5–5.2)
PROT SERPL-MCNC: 6.9 G/DL (ref 6–8.5)
PROT UR QL STRIP: NEGATIVE
RBC # BLD AUTO: 4.96 10*6/MM3 (ref 3.77–5.28)
RBC # UR STRIP: ABNORMAL /HPF
REF LAB TEST METHOD: ABNORMAL
SODIUM SERPL-SCNC: 142 MMOL/L (ref 136–145)
SP GR UR STRIP: 1.02 (ref 1–1.03)
SQUAMOUS #/AREA URNS HPF: ABNORMAL /HPF
UROBILINOGEN UR QL STRIP: ABNORMAL
WBC # UR STRIP: ABNORMAL /HPF
WBC NRBC COR # BLD: 6.98 10*3/MM3 (ref 3.4–10.8)
WHOLE BLOOD HOLD COAG: NORMAL
WHOLE BLOOD HOLD SPECIMEN: NORMAL

## 2023-03-05 PROCEDURE — 25010000002 ONDANSETRON PER 1 MG: Performed by: EMERGENCY MEDICINE

## 2023-03-05 PROCEDURE — 25010000002 KETOROLAC TROMETHAMINE PER 15 MG: Performed by: EMERGENCY MEDICINE

## 2023-03-05 PROCEDURE — 96365 THER/PROPH/DIAG IV INF INIT: CPT

## 2023-03-05 PROCEDURE — 85025 COMPLETE CBC W/AUTO DIFF WBC: CPT | Performed by: EMERGENCY MEDICINE

## 2023-03-05 PROCEDURE — 80053 COMPREHEN METABOLIC PANEL: CPT | Performed by: EMERGENCY MEDICINE

## 2023-03-05 PROCEDURE — 96375 TX/PRO/DX INJ NEW DRUG ADDON: CPT

## 2023-03-05 PROCEDURE — 74177 CT ABD & PELVIS W/CONTRAST: CPT

## 2023-03-05 PROCEDURE — 99283 EMERGENCY DEPT VISIT LOW MDM: CPT

## 2023-03-05 PROCEDURE — 25010000002 CEFTRIAXONE PER 250 MG: Performed by: EMERGENCY MEDICINE

## 2023-03-05 PROCEDURE — 83690 ASSAY OF LIPASE: CPT | Performed by: EMERGENCY MEDICINE

## 2023-03-05 PROCEDURE — 84702 CHORIONIC GONADOTROPIN TEST: CPT | Performed by: EMERGENCY MEDICINE

## 2023-03-05 PROCEDURE — 0 IOHEXOL 300 MG/ML SOLUTION: Performed by: EMERGENCY MEDICINE

## 2023-03-05 PROCEDURE — 81001 URINALYSIS AUTO W/SCOPE: CPT | Performed by: EMERGENCY MEDICINE

## 2023-03-05 RX ORDER — KETOROLAC TROMETHAMINE 30 MG/ML
30 INJECTION, SOLUTION INTRAMUSCULAR; INTRAVENOUS ONCE
Status: COMPLETED | OUTPATIENT
Start: 2023-03-05 | End: 2023-03-05

## 2023-03-05 RX ORDER — SODIUM CHLORIDE 0.9 % (FLUSH) 0.9 %
10 SYRINGE (ML) INJECTION AS NEEDED
Status: DISCONTINUED | OUTPATIENT
Start: 2023-03-05 | End: 2023-03-05 | Stop reason: HOSPADM

## 2023-03-05 RX ORDER — CEPHALEXIN 500 MG/1
500 CAPSULE ORAL 4 TIMES DAILY
Qty: 40 CAPSULE | Refills: 0 | Status: SHIPPED | OUTPATIENT
Start: 2023-03-05 | End: 2023-03-20

## 2023-03-05 RX ORDER — CEFTRIAXONE SODIUM 1 G/50ML
1 INJECTION, SOLUTION INTRAVENOUS ONCE
Status: COMPLETED | OUTPATIENT
Start: 2023-03-05 | End: 2023-03-05

## 2023-03-05 RX ORDER — ONDANSETRON 2 MG/ML
4 INJECTION INTRAMUSCULAR; INTRAVENOUS ONCE
Status: COMPLETED | OUTPATIENT
Start: 2023-03-05 | End: 2023-03-05

## 2023-03-05 RX ORDER — KETOROLAC TROMETHAMINE 10 MG/1
10 TABLET, FILM COATED ORAL EVERY 6 HOURS PRN
Qty: 15 TABLET | Refills: 0 | Status: SHIPPED | OUTPATIENT
Start: 2023-03-05

## 2023-03-05 RX ADMIN — Medication 10 ML: at 09:17

## 2023-03-05 RX ADMIN — ONDANSETRON 4 MG: 2 INJECTION INTRAMUSCULAR; INTRAVENOUS at 09:17

## 2023-03-05 RX ADMIN — CEFTRIAXONE SODIUM 1 G: 1 INJECTION, SOLUTION INTRAVENOUS at 12:17

## 2023-03-05 RX ADMIN — IOHEXOL 100 ML: 300 INJECTION, SOLUTION INTRAVENOUS at 10:46

## 2023-03-05 RX ADMIN — KETOROLAC TROMETHAMINE 30 MG: 30 INJECTION, SOLUTION INTRAMUSCULAR; INTRAVENOUS at 09:17

## 2023-03-05 NOTE — ED PROVIDER NOTES
Time: 8:59 AM EST  Date of encounter:  3/5/2023  Independent Historian/Clinical History and Information was obtained by:   Patient  Chief Complaint: right sided pain    History is limited by: N/A    History of Present Illness:  Patient is a 27 y.o. year old female who presents to the emergency department for evaluation of right lower abdominal and right-sided back pain that started 1 week ago.  Patient denies fever and chills.  Patient has no chest pain or shortness of breath.  Patient has no cough or hemoptysis.  Patient denies dysuria urinary frequency.  Patient has no nausea, vomiting, or diarrhea.  Patient has no vaginal bleeding or discharge.    HPI    Patient Care Team  Primary Care Provider: Paulina Gale APRN    Past Medical History:     Allergies   Allergen Reactions   • Ciprofloxacin Rash   • Doxycycline Hyclate Rash   • Doxycycline Monohydrate Rash   • Latex Rash   • Sulfa Antibiotics Rash   • Sulfamethoxazole-Trimethoprim Rash and Hives   • Trimethoprim Rash     Past Medical History:   Diagnosis Date   • Anxiety    • Asthma    • Clostridium difficile diarrhea 01/30/2022   • GERD (gastroesophageal reflux disease)    • Kidney stones    • Migraine without aura    • Ovarian cyst     right ovary    • PVC (premature ventricular contraction)    • Sleep apnea      Past Surgical History:   Procedure Laterality Date   • COLONOSCOPY  2020   • COLONOSCOPY N/A 7/11/2022    Procedure: COLONOSCOPY BIOPSY;  Surgeon: David Yip MD;  Location: [facility] ENDOSCOPY;  Service: Gastroenterology;  Laterality: N/A;  NORMAL COLON   • ENDOSCOPY     • KIDNEY STONE SURGERY  11/2021   • URETEROSCOPY LASER LITHOTRIPSY WITH STENT INSERTION Left 11/13/2021    Procedure: URETEROSCOPY LASER LITHOTRIPSY WITH STENT INSERTION STONE BASKET EXTRACTION;  Surgeon: Colt Sanders MD;  Location: [facility] MAIN OR;  Service: Urology;  Laterality: Left;     Family History   Problem Relation Age of Onset   • Heart failure Mother     • Skin cancer Maternal Grandmother    • Colon cancer Maternal Grandfather    • Heart failure Maternal Grandfather    • Heart disease Maternal Great-Grandfather    • Breast cancer Other 60   • Ovarian cancer Neg Hx    • Uterine cancer Neg Hx    • Prostate cancer Neg Hx        Home Medications:  Prior to Admission medications    Medication Sig Start Date End Date Taking? Authorizing Provider   albuterol sulfate  (90 Base) MCG/ACT inhaler INHALE 2 PUFFS INTO THE LUNGS EVERY 4 (FOUR) HOURS AS NEEDED FOR SHORTNESS OF AIR. 8/22/22   Eric Amaya MD   bisoprolol (ZEBeta) 5 MG tablet Take 2.5 mg by mouth Daily.    Eric Amaya MD   famotidine (PEPCID) 20 MG tablet TAKE 1 TABLET BY MOUTH 2 TIMES A DAY 7/6/22   Deyanira Dumont APRN   guaifenesin-dextromethorphan (MUCINEX DM)  MG tablet sustained-release 12 hour tablet Take 1 tablet by mouth 2 (Two) Times a Day As Needed (cough). 12/13/22   Charlotte Lal MD   venlafaxine XR (EFFEXOR-XR) 75 MG 24 hr capsule Take 75 mg by mouth Daily. 10/14/22   Eric Amaya MD   venlafaxine XR (EFFEXOR-XR) 75 MG 24 hr capsule Take 1 capsule by mouth Daily. 1/17/23   Eric Amaya MD   fluticasone (FLONASE) 50 MCG/ACT nasal spray 2 sprays into the nostril(s) as directed by provider Daily. 7/25/21 9/19/21  Rosio Mccoy APRN        Social History:   Social History     Tobacco Use   • Smoking status: Never     Passive exposure: Yes   • Smokeless tobacco: Never   • Tobacco comments:     secondhand smoke exposure   Vaping Use   • Vaping Use: Never used   Substance Use Topics   • Alcohol use: No   • Drug use: No         Review of Systems:  Review of Systems   Constitutional: Negative for chills and fever.   HENT: Negative for congestion, rhinorrhea and sore throat.    Eyes: Negative for pain and visual disturbance.   Respiratory: Negative for apnea, cough, chest tightness and shortness of breath.    Cardiovascular: Negative  "for chest pain and palpitations.   Gastrointestinal: Positive for abdominal pain. Negative for diarrhea, nausea and vomiting.   Genitourinary: Negative for difficulty urinating and dysuria.   Musculoskeletal: Positive for back pain. Negative for joint swelling and myalgias.   Skin: Negative for color change.   Neurological: Negative for seizures and headaches.   Psychiatric/Behavioral: Negative.    All other systems reviewed and are negative.       Physical Exam:  /100 (BP Location: Right arm, Patient Position: Lying)   Pulse 102   Temp 98.1 °F (36.7 °C) (Oral)   Resp 18   Ht 162.6 cm (64\")   Wt 123 kg (270 lb 8.1 oz)   SpO2 97%   BMI 46.43 kg/m²     Physical Exam  Vitals and nursing note reviewed.   Constitutional:       General: She is not in acute distress.     Appearance: Normal appearance. She is not toxic-appearing.   HENT:      Head: Normocephalic and atraumatic.      Jaw: There is normal jaw occlusion.   Eyes:      General: Lids are normal.      Extraocular Movements: Extraocular movements intact.      Conjunctiva/sclera: Conjunctivae normal.      Pupils: Pupils are equal, round, and reactive to light.   Cardiovascular:      Rate and Rhythm: Normal rate and regular rhythm.      Pulses: Normal pulses.      Heart sounds: Normal heart sounds.   Pulmonary:      Effort: Pulmonary effort is normal. No respiratory distress.      Breath sounds: Normal breath sounds. No wheezing or rhonchi.   Abdominal:      General: Abdomen is flat.      Palpations: Abdomen is soft.      Tenderness: There is abdominal tenderness. There is no guarding or rebound.      Comments: (+) right lower quadrant tenderness   Musculoskeletal:         General: Normal range of motion.      Cervical back: Normal range of motion and neck supple.      Right lower leg: No edema.      Left lower leg: No edema.   Skin:     General: Skin is warm and dry.   Neurological:      Mental Status: She is alert and oriented to person, place, and " time. Mental status is at baseline.   Psychiatric:         Mood and Affect: Mood normal.                  Procedures:  Procedures      Medical Decision Making:      Comorbidities that affect care:    Asthma    External Notes reviewed:    Previous Clinic Note: Patient was recently seen in urgent care center for diarrhea.      The following orders were placed and all results were independently analyzed by me:  Orders Placed This Encounter   Procedures   • CT Abdomen Pelvis With Contrast   • Independence Draw   • Comprehensive Metabolic Panel   • Lipase   • Urinalysis With Microscopic If Indicated (No Culture) - Urine, Clean Catch   • hCG, Quantitative, Pregnancy   • CBC Auto Differential   • Urinalysis, Microscopic Only - Urine, Clean Catch   • NPO Diet NPO Type: Strict NPO   • Undress & Gown   • Insert Peripheral IV   • CBC & Differential   • Green Top (Gel)   • Lavender Top   • Gold Top - SST   • Light Blue Top       Medications Given in the Emergency Department:  Medications   sodium chloride 0.9 % flush 10 mL (10 mL Intravenous Given 3/5/23 0917)   cefTRIAXone (ROCEPHIN) IVPB 1 g (has no administration in time range)   ketorolac (TORADOL) injection 30 mg (30 mg Intravenous Given 3/5/23 0917)   ondansetron (ZOFRAN) injection 4 mg (4 mg Intravenous Given 3/5/23 0917)   iohexol (OMNIPAQUE) 300 MG/ML injection 100 mL (100 mL Intravenous Given 3/5/23 1046)        ED Course:         Labs:    Lab Results (last 24 hours)     Procedure Component Value Units Date/Time    CBC & Differential [[id number]]  (Normal) Collected: 03/05/23 0909    Specimen: Blood Updated: 03/05/23 0920    Narrative:      The following orders were created for panel order CBC & Differential.  Procedure                               Abnormality         Status                     ---------                               -----------         ------                     CBC Auto Differential[[id number]]        Normal              Final result                  Please view results for these tests on the individual orders.    Comprehensive Metabolic Panel [[id number]]  (Abnormal) Collected: 03/05/23 0909    Specimen: Blood Updated: 03/05/23 0945     Glucose 108 mg/dL      BUN 8 mg/dL      Creatinine 0.85 mg/dL      Sodium 142 mmol/L      Potassium 3.9 mmol/L      Chloride 105 mmol/L      CO2 27.4 mmol/L      Calcium 9.5 mg/dL      Total Protein 6.9 g/dL      Albumin 4.3 g/dL      ALT (SGPT) 22 U/L      AST (SGOT) 17 U/L      Alkaline Phosphatase 127 U/L      Total Bilirubin 0.3 mg/dL      Globulin 2.6 gm/dL      A/G Ratio 1.7 g/dL      BUN/Creatinine Ratio 9.4     Anion Gap 9.6 mmol/L      eGFR 97.6 mL/min/1.73     Narrative:      GFR Normal >60  Chronic Kidney Disease <60  Kidney Failure <15      Lipase [[id number]]  (Normal) Collected: 03/05/23 0909    Specimen: Blood Updated: 03/05/23 0945     Lipase 29 U/L     hCG, Quantitative, Pregnancy [[id number]] Collected: 03/05/23 0909    Specimen: Blood Updated: 03/05/23 0941     HCG Quantitative <0.50 mIU/mL     Narrative:      HCG Ranges by Gestational Age    Females - non-pregnant premenopausal   </= 1mIU/mL HCG  Females - postmenopausal               </= 7mIU/mL HCG    3 Weeks       5.4   -      72 mIU/mL  4 Weeks      10.2   -     708 mIU/mL  5 Weeks       217   -   8,245 mIU/mL  6 Weeks       152   -  32,177 mIU/mL  7 Weeks     4,059   - 153,767 mIU/mL  8 Weeks    31,366   - 149,094 mIU/mL  9 Weeks    59,109   - 135,901 mIU/mL  10 Weeks   44,186   - 170,409 mIU/mL  12 Weeks   27,107   - 201,615 mIU/mL  14 Weeks   24,302   -  93,646 mIU/mL  15 Weeks   12,540   -  69,747 mIU/mL  16 Weeks    8,904   -  55,332 mIU/mL  17 Weeks    8,240   -  51,793 mIU/mL  18 Weeks    9,649   -  55,271 mIU/mL    Results may be falsely decreased if patient taking Biotin.      CBC Auto Differential [[id number]]  (Normal) Collected: 03/05/23 0909    Specimen: Blood Updated: 03/05/23 0920     WBC 6.98 10*3/mm3      RBC 4.96 10*6/mm3      Hemoglobin  14.9 g/dL      Hematocrit 43.8 %      MCV 88.3 fL      MCH 30.0 pg      MCHC 34.0 g/dL      RDW 12.6 %      RDW-SD 41.1 fl      MPV 9.8 fL      Platelets 251 10*3/mm3      Neutrophil % 57.1 %      Lymphocyte % 34.2 %      Monocyte % 6.0 %      Eosinophil % 1.4 %      Basophil % 1.0 %      Immature Grans % 0.3 %      Neutrophils, Absolute 3.98 10*3/mm3      Lymphocytes, Absolute 2.39 10*3/mm3      Monocytes, Absolute 0.42 10*3/mm3      Eosinophils, Absolute 0.10 10*3/mm3      Basophils, Absolute 0.07 10*3/mm3      Immature Grans, Absolute 0.02 10*3/mm3      nRBC 0.0 /100 WBC     Urinalysis With Microscopic If Indicated (No Culture) - Urine, Clean Catch [[id number]]  (Abnormal) Collected: 03/05/23 0910    Specimen: Urine, Clean Catch Updated: 03/05/23 0925     Color, UA Yellow     Appearance, UA Cloudy     pH, UA 6.5     Specific Gravity, UA 1.016     Glucose, UA Negative     Ketones, UA Negative     Bilirubin, UA Negative     Blood, UA Trace     Protein, UA Negative     Leuk Esterase, UA Large (3+)     Nitrite, UA Negative     Urobilinogen, UA 0.2 E.U./dL    Urinalysis, Microscopic Only - Urine, Clean Catch [[id number]]  (Abnormal) Collected: 03/05/23 0910    Specimen: Urine, Clean Catch Updated: 03/05/23 0949     RBC, UA 0-2 /HPF      WBC, UA 31-50 /HPF      Bacteria, UA 3+ /HPF      Squamous Epithelial Cells, UA 7-12 /HPF      Hyaline Casts, UA None Seen /LPF      Calcium Oxalate Crystals, UA Small/1+ /HPF      Methodology Manual Light Microscopy           Imaging:    CT Abdomen Pelvis With Contrast    Result Date: 3/5/2023  PROCEDURE: CT ABDOMEN PELVIS W CONTRAST  [facility] CT [facility]BDOMEN PELVIS W CONTRAST, 6/30/2022, 10:54.  INDICATIONS: abdominal pain  TECHNIQUE: After obtaining the patient's consent, CT images were created with non-ionic intravenous contrast material.   PROTOCOL:   Standard imaging protocol performed    RADIATION:   DLP: 1194.7 mGy*cm   Automated exposure  control was utilized to minimize radiation dose. CONTRAST: 100 cc Isovue 370 I.V.  FINDINGS:  Visualized lung bases are clear.  The liver, pancreas, and spleen are within normal limits.  Gallbladder appears normal.  No biliary tract obstruction.  Bilateral adrenal glands are normal.  Kidneys appear within normal limits.  No abdominal retroperitoneal adenopathy.  Upper GI tract is within normal limits.  Pelvis:  Urinary bladder is within normal limits.  IUD is in place in the uterus.  Uterus and ovaries are otherwise unremarkable.  GI tract including the appendix is normal.  No pelvic or inguinal adenopathy.  No free intraperitoneal fluid.  No lytic or sclerotic bony lesions are seen.        1. No acute process seen within the abdomen or pelvis.  The appendix is normal.  Uterus and ovaries appear within normal limits.     HENRY ZACARIAS MD       Electronically Signed and Approved By: HENRY ZACARIAS MD on 3/05/2023 at 11:07                 Differential Diagnosis and Discussion:    Abdominal Pain: Based on the patient's signs and symptoms, I considered abdominal aortic aneurysm, small bowel obstruction, pancreatitis, acute cholecystitis, acute appendecitis, peptic ulcer disease, gastritis, colitis, endocrine disorders, irritable bowel syndrome and other differential diagnosis an etiology of the patient's abdominal pain.    All labs were reviewed and interpreted by me.  CT scan radiology interpretation was reviewed by me.    MDM  Number of Diagnoses or Management Options  Acute UTI  Diagnosis management comments: Based on the results of the patient´s urinalysis and urinary complaints, signs, symptoms, and diagnostic testing is consistent with a urinary tract infection.  The patient´s CBC that was reviewed and interpreted by me shows no abnormalities of critical concern. Of note, there is no anemia requiring a blood transfusion and the platelet count is acceptable.  The patient´s CMP that was reviewed and interpretted by  me shows no abnormalities of critical concern. Of note, the patient´s sodium and potassium are acceptable. The patient´s liver enzymes are unremarkable. The patient´s renal function (creatinine) is preserved. The patient has a normal anion gap.  Urinalysis shows 3+ bacteriuria.  CT scan of the abdomen pelvis is negative for acute intra-abdominal pathology.  Patient is resting comfortably, is alert, and is in no distress. The repeat examination is unremarkable and benign. The patient has no signs of urosepsis. The patient was started on antibiotics in the emergency department and will be discharged with antibiotics as an outpatient. The patient was counseled to return to the ER for fever >100.5, intractable pain or vomiting, or any other concerns that the may have. The patient has expressed a clear and thorough understanding and agreed to follow up as instructed.        Amount and/or Complexity of Data Reviewed  Clinical lab tests: reviewed  Tests in the radiology section of CPT®: reviewed  Independent visualization of images, tracings, or specimens: yes    Risk of Complications, Morbidity, and/or Mortality  Presenting problems: moderate  Management options: moderate    Patient Progress  Patient progress: stable           Patient Care Considerations:    NARCOTICS: I considered prescribing opiate pain medication as an outpatient, however Patient CT shows no acute pathology warranting narcotics.      Consultants/Shared Management Plan:    None    Social Determinants of Health:    Patient is independent, reliable, and has access to care.       Disposition and Care Coordination:    Discharged: The patient is suitable and stable for discharge with no need for consideration of observation or admission.    I have explained the patient´s condition, diagnoses and treatment plan based on the information available to me at this time. I have answered questions and addressed any concerns. The patient has a good  understanding of  the patient´s diagnosis, condition, and treatment plan as can be expected at this point. The vital signs have been stable. The patient´s condition is stable and appropriate for discharge from the emergency department.      The patient will pursue further outpatient evaluation with the primary care physician or other designated or consulting physician as outlined in the discharge instructions. They are agreeable to this plan of care and follow-up instructions have been explained in detail. The patient has received these instructions in written format and have expressed an understanding of the discharge instructions. The patient is aware that any significant change in condition or worsening of symptoms should prompt an immediate return to this or the closest emergency department or call to 911.  I have explained discharge medications and the need for follow up with the patient/caretakers. This was also printed in the discharge instructions. Patient was discharged with the following medications and follow up:      Medication List      New Prescriptions    cephalexin 500 MG capsule  Commonly known as: KEFLEX  Take 1 capsule by mouth 4 (Four) Times a Day.     ketorolac 10 MG tablet  Commonly known as: TORADOL  Take 1 tablet by mouth Every 6 (Six) Hours As Needed for Moderate Pain.           Where to Get Your Medications      These medications were sent to [facility]/pharmacy #[id number] - [place], [place] - [address] - [phone]  - [phone]   [address] [place] [address]    Hours: 24-hours Phone: [phone]   · cephalexin 500 MG capsule  · ketorolac 10 MG tablet      Paulina Gale, APRN  [address] [facility]  [address]  [facility] [address]  [phone]             Final diagnoses:   Acute UTI        ED Disposition     ED Disposition   Discharge    Condition   Stable    Comment   --             This medical record created using voice recognition software.           Valeriano Red,  MD  03/05/23 1147

## 2023-03-06 ENCOUNTER — TELEPHONE (OUTPATIENT)
Dept: OBSTETRICS AND GYNECOLOGY | Facility: CLINIC | Age: 28
End: 2023-03-06

## 2023-03-06 NOTE — TELEPHONE ENCOUNTER
Caller: Kinsey Suresh    Relationship to patient: Self    Best call back number: [phone]    Patient is needing: PT CANCELLED SAME DAY APPT AT 9:15 WITH DYANA BURTON. STATED SHE NO LONGER HAS BLOOD IN HER URIN.

## 2023-03-17 ENCOUNTER — TELEPHONE (OUTPATIENT)
Dept: PLASTIC SURGERY | Facility: CLINIC | Age: 28
End: 2023-03-17
Payer: COMMERCIAL

## 2023-03-17 NOTE — TELEPHONE ENCOUNTER
Called patient to schedule surgery. Patient did not answer, left voicemail. First attempt at contact.

## 2023-03-20 PROCEDURE — 82962 GLUCOSE BLOOD TEST: CPT

## 2023-03-20 PROCEDURE — 87086 URINE CULTURE/COLONY COUNT: CPT

## 2023-03-21 PROBLEM — N62 MACROMASTIA: Status: ACTIVE | Noted: 2023-03-21

## 2023-03-22 ENCOUNTER — TELEPHONE (OUTPATIENT)
Dept: URGENT CARE | Facility: CLINIC | Age: 28
End: 2023-03-22
Payer: COMMERCIAL

## 2023-03-22 NOTE — TELEPHONE ENCOUNTER
----- Message from John Calvin Cooksey, PA-C sent at 3/21/2023  9:05 PM EDT -----  Please call the patient regarding her urine culture which showed no growth.  This means that she does not have a urinary tract infection.    Thank you,    -John Cooksey, PA-C

## 2023-03-23 ENCOUNTER — TELEPHONE (OUTPATIENT)
Dept: URGENT CARE | Facility: CLINIC | Age: 28
End: 2023-03-23
Payer: COMMERCIAL

## 2023-06-05 ENCOUNTER — LAB (OUTPATIENT)
Dept: LAB | Facility: HOSPITAL | Age: 28
End: 2023-06-05
Payer: COMMERCIAL

## 2023-06-05 ENCOUNTER — OFFICE VISIT (OUTPATIENT)
Dept: OBSTETRICS AND GYNECOLOGY | Facility: CLINIC | Age: 28
End: 2023-06-05
Payer: COMMERCIAL

## 2023-06-05 ENCOUNTER — OFFICE VISIT (OUTPATIENT)
Dept: FAMILY MEDICINE CLINIC | Facility: CLINIC | Age: 28
End: 2023-06-05
Payer: COMMERCIAL

## 2023-06-05 VITALS
SYSTOLIC BLOOD PRESSURE: 123 MMHG | WEIGHT: 268 LBS | HEIGHT: 64 IN | DIASTOLIC BLOOD PRESSURE: 78 MMHG | HEART RATE: 97 BPM | BODY MASS INDEX: 45.75 KG/M2

## 2023-06-05 VITALS
HEART RATE: 95 BPM | BODY MASS INDEX: 45.55 KG/M2 | OXYGEN SATURATION: 96 % | SYSTOLIC BLOOD PRESSURE: 131 MMHG | DIASTOLIC BLOOD PRESSURE: 100 MMHG | WEIGHT: 266.8 LBS | HEIGHT: 64 IN

## 2023-06-05 DIAGNOSIS — E66.01 CLASS 3 SEVERE OBESITY DUE TO EXCESS CALORIES WITHOUT SERIOUS COMORBIDITY WITH BODY MASS INDEX (BMI) OF 45.0 TO 49.9 IN ADULT: ICD-10-CM

## 2023-06-05 DIAGNOSIS — Z13.6 ENCOUNTER FOR LIPID SCREENING FOR CARDIOVASCULAR DISEASE: ICD-10-CM

## 2023-06-05 DIAGNOSIS — Z13.220 ENCOUNTER FOR LIPID SCREENING FOR CARDIOVASCULAR DISEASE: ICD-10-CM

## 2023-06-05 DIAGNOSIS — E11.9 TYPE 2 DIABETES MELLITUS WITHOUT COMPLICATION, WITHOUT LONG-TERM CURRENT USE OF INSULIN: ICD-10-CM

## 2023-06-05 DIAGNOSIS — Z11.3 SCREEN FOR STD (SEXUALLY TRANSMITTED DISEASE): ICD-10-CM

## 2023-06-05 DIAGNOSIS — I10 ESSENTIAL (PRIMARY) HYPERTENSION: ICD-10-CM

## 2023-06-05 DIAGNOSIS — Z30.09 ENCOUNTER FOR OTHER GENERAL COUNSELING OR ADVICE ON CONTRACEPTION: Primary | ICD-10-CM

## 2023-06-05 DIAGNOSIS — I10 ESSENTIAL (PRIMARY) HYPERTENSION: Primary | ICD-10-CM

## 2023-06-05 PROBLEM — E66.813 CLASS 3 SEVERE OBESITY DUE TO EXCESS CALORIES WITHOUT SERIOUS COMORBIDITY WITH BODY MASS INDEX (BMI) OF 45.0 TO 49.9 IN ADULT: Status: ACTIVE | Noted: 2023-06-05

## 2023-06-05 LAB
ALBUMIN SERPL-MCNC: 4.3 G/DL (ref 3.5–5.2)
ALBUMIN/GLOB SERPL: 1.7 G/DL
ALP SERPL-CCNC: 101 U/L (ref 39–117)
ALT SERPL W P-5'-P-CCNC: 16 U/L (ref 1–33)
ANION GAP SERPL CALCULATED.3IONS-SCNC: 10 MMOL/L (ref 5–15)
AST SERPL-CCNC: 18 U/L (ref 1–32)
BASOPHILS # BLD AUTO: 0.06 10*3/MM3 (ref 0–0.2)
BASOPHILS NFR BLD AUTO: 0.7 % (ref 0–1.5)
BILIRUB SERPL-MCNC: 0.2 MG/DL (ref 0–1.2)
BUN SERPL-MCNC: 7 MG/DL (ref 6–20)
BUN/CREAT SERPL: 7.4 (ref 7–25)
CALCIUM SPEC-SCNC: 9.7 MG/DL (ref 8.6–10.5)
CHLORIDE SERPL-SCNC: 104 MMOL/L (ref 98–107)
CHOLEST SERPL-MCNC: 183 MG/DL (ref 0–200)
CO2 SERPL-SCNC: 27 MMOL/L (ref 22–29)
CREAT SERPL-MCNC: 0.95 MG/DL (ref 0.57–1)
DEPRECATED RDW RBC AUTO: 38 FL (ref 37–54)
EGFRCR SERPLBLD CKD-EPI 2021: 84.9 ML/MIN/1.73
EOSINOPHIL # BLD AUTO: 0.1 10*3/MM3 (ref 0–0.4)
EOSINOPHIL NFR BLD AUTO: 1.2 % (ref 0.3–6.2)
ERYTHROCYTE [DISTWIDTH] IN BLOOD BY AUTOMATED COUNT: 11.8 % (ref 12.3–15.4)
EXPIRATION DATE: ABNORMAL
GLOBULIN UR ELPH-MCNC: 2.5 GM/DL
GLUCOSE SERPL-MCNC: 102 MG/DL (ref 65–99)
HBA1C MFR BLD: 5.9 %
HBV SURFACE AG SERPL QL IA: NORMAL
HCT VFR BLD AUTO: 44.8 % (ref 34–46.6)
HCV AB SER DONR QL: NORMAL
HDLC SERPL-MCNC: 34 MG/DL (ref 40–60)
HGB BLD-MCNC: 15 G/DL (ref 12–15.9)
HIV1+2 AB SER QL: NORMAL
IMM GRANULOCYTES # BLD AUTO: 0.04 10*3/MM3 (ref 0–0.05)
IMM GRANULOCYTES NFR BLD AUTO: 0.5 % (ref 0–0.5)
LDLC SERPL CALC-MCNC: 117 MG/DL (ref 0–100)
LDLC/HDLC SERPL: 3.33 {RATIO}
LYMPHOCYTES # BLD AUTO: 2.76 10*3/MM3 (ref 0.7–3.1)
LYMPHOCYTES NFR BLD AUTO: 33.9 % (ref 19.6–45.3)
Lab: ABNORMAL
MCH RBC QN AUTO: 29.2 PG (ref 26.6–33)
MCHC RBC AUTO-ENTMCNC: 33.5 G/DL (ref 31.5–35.7)
MCV RBC AUTO: 87.2 FL (ref 79–97)
MONOCYTES # BLD AUTO: 0.46 10*3/MM3 (ref 0.1–0.9)
MONOCYTES NFR BLD AUTO: 5.7 % (ref 5–12)
NEUTROPHILS NFR BLD AUTO: 4.71 10*3/MM3 (ref 1.7–7)
NEUTROPHILS NFR BLD AUTO: 58 % (ref 42.7–76)
NRBC BLD AUTO-RTO: 0 /100 WBC (ref 0–0.2)
PLATELET # BLD AUTO: 255 10*3/MM3 (ref 140–450)
PMV BLD AUTO: 10.7 FL (ref 6–12)
POTASSIUM SERPL-SCNC: 4 MMOL/L (ref 3.5–5.2)
PROT SERPL-MCNC: 6.8 G/DL (ref 6–8.5)
RBC # BLD AUTO: 5.14 10*6/MM3 (ref 3.77–5.28)
SODIUM SERPL-SCNC: 141 MMOL/L (ref 136–145)
T PALLIDUM IGG SER QL: NORMAL
TRIGL SERPL-MCNC: 179 MG/DL (ref 0–150)
VLDLC SERPL-MCNC: 32 MG/DL (ref 5–40)
WBC NRBC COR # BLD: 8.13 10*3/MM3 (ref 3.4–10.8)

## 2023-06-05 PROCEDURE — G0432 EIA HIV-1/HIV-2 SCREEN: HCPCS

## 2023-06-05 PROCEDURE — 86780 TREPONEMA PALLIDUM: CPT

## 2023-06-05 PROCEDURE — 99213 OFFICE O/P EST LOW 20 MIN: CPT | Performed by: OBSTETRICS & GYNECOLOGY

## 2023-06-05 PROCEDURE — 80053 COMPREHEN METABOLIC PANEL: CPT

## 2023-06-05 PROCEDURE — 36415 COLL VENOUS BLD VENIPUNCTURE: CPT

## 2023-06-05 PROCEDURE — 87340 HEPATITIS B SURFACE AG IA: CPT

## 2023-06-05 PROCEDURE — 80061 LIPID PANEL: CPT

## 2023-06-05 PROCEDURE — 86803 HEPATITIS C AB TEST: CPT

## 2023-06-05 PROCEDURE — 85025 COMPLETE CBC W/AUTO DIFF WBC: CPT

## 2023-06-05 RX ORDER — SEMAGLUTIDE 0.25 MG/.5ML
0.25 INJECTION, SOLUTION SUBCUTANEOUS WEEKLY
Qty: 2 ML | Refills: 0 | Status: SHIPPED | OUTPATIENT
Start: 2023-06-05 | End: 2023-07-05

## 2023-06-05 RX ORDER — LISINOPRIL 10 MG/1
10 TABLET ORAL DAILY
Qty: 30 TABLET | Refills: 0 | Status: SHIPPED | OUTPATIENT
Start: 2023-06-05 | End: 2023-07-05

## 2023-06-05 NOTE — PROGRESS NOTES
Have discussed with patient in office that her blood sugar A1c was 5.9%.  We discussed carb reduction and weight loss.

## 2023-06-05 NOTE — PROGRESS NOTES
"GYN Problem/Follow Up Visit    Chief Complaint   Patient presents with    DISCUSS IUD           HPI  Kinsey Suresh is a 28 y.o. female, , who presents for iud discussion. Currently has mirena but c/o weight gain with it. Has gained ten pounds since insertion last year despite eating better and exercising. Does report hx of heavy menses. Spots with mirena. Interested in a different iud. Also desires full std screen. States has hx of ureaplasma and wants to include that.        Additional OB/GYN History   No LMP recorded. Patient has had an implant.  Current contraception: contraceptive methods: IUD.  Insertion date:   Allergies : Ciprofloxacin, Doxycycline hyclate, Doxycycline monohydrate, Latex, Sulfa antibiotics, Sulfamethoxazole-trimethoprim, and Trimethoprim     The additional following portions of the patient's history were reviewed and updated as appropriate: allergies, current medications, past family history, past medical history, past social history, past surgical history, and problem list.    Review of Systems    I have reviewed and agree with the HPI, ROS, and historical information as entered above. Felicita Edward, APRN    Objective   /78   Pulse 97   Ht 162.6 cm (64\")   Wt 122 kg (268 lb)   BMI 46.00 kg/m²     Physical Exam  Vitals reviewed.   Neurological:      Mental Status: She is alert and oriented to person, place, and time.          Assessment and Plan    Diagnoses and all orders for this visit:    1. Encounter for other general counseling or advice on contraception (Primary)    2. Screen for STD (sexually transmitted disease)  -     Ct, Ng, Mycoplasmas ALISSON, Urine - Urine, Clean Catch  -     Hepatitis B Surface Antigen  -     Hepatitis C Antibody  -     HIV-1 & HIV-2 Antibodies  -     T Pallidum Antibody (FTA-Ab)    Discussed r/b/se of all IUDs. Wants to try kyleena to see if it has less side effects but can still help with her menses. Does not need beta. Will check urine preg " test the day of her ov. Std screen collected with blood and urine per pt request.     Counseling:  She understands the importance of having the above orders performed in a timely fashion.  She is encouraged to review her results online and/or contact or office if she has questions.     Follow Up:  Return for mirena removal and kyleena insertion, no beta needed.      Felicita Edward, MICHEAL  06/05/2023

## 2023-06-05 NOTE — PROGRESS NOTES
Subjective     CHIEF COMPLAINT    Chief Complaint   Patient presents with    New Patient Preventive Medicine Services     Establish Care, Pt previous PCP was Paulina Gale    Weight Gain     Pt states that she has gained at least 40 lbs within the last year.            HPI    Kinsey Suresh is a 28 y.o. female who presents with concerns listed above.         History of Present Illness    Kinsey Suresh is a 28-year-old female who presents today to [facility]. Patient has been experiencing hypertension and is transferring from [facility].     In office blood pressure today is 131/100.  The patient reports recent high blood pressure and migraines.  He feels that her migraines are related to her blood pressure.  Last week at work it was 162/100 mmHg. Upon recheck it was approximately 150 mmHg. She had a pounding headache. She adds she has felt that way for a week. Her blood pressure has been elevated during her last 2 urgent care visits. She has not taken antihypertensives in the past. The patient admits to having stressors, especially at work.  States that she will often feel lightheaded and have a headache when she has elevated blood pressure.    We discussed that she has borderline diabetic as her A1c is 5.9%.  She would like help with weight loss to help improve her A1c and prevent diabetes.    The patient has gained 46 pounds and would like to discuss a weight loss plan.     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    Patient does have a Mirena in place since 4/20/2022.      Review of Systems         Past Medical History:   Diagnosis Date    Allergic Not sure    Sulfa    Anxiety     Asthma     Clostridium difficile diarrhea 01/30/2022    Depression     GERD (gastroesophageal reflux disease)     HL (hearing loss)     Hypertension     Irritable bowel syndrome     Kidney stones     Migraine without aura     Obesity     Ovarian cyst     right ovary     PVC (premature ventricular contraction)     Sleep apnea      Urinary tract infection             Past Surgical History:   Procedure Laterality Date    COLONOSCOPY  2020    COLONOSCOPY N/A 7/11/2022    Procedure: COLONOSCOPY BIOPSY;  Surgeon: David Yip MD;  Location: [facility] ENDOSCOPY;  Service: Gastroenterology;  Laterality: N/A;  NORMAL COLON    ENDOSCOPY      KIDNEY STONE SURGERY  11/2021    URETEROSCOPY LASER LITHOTRIPSY WITH STENT INSERTION Left 11/13/2021    Procedure: URETEROSCOPY LASER LITHOTRIPSY WITH STENT INSERTION STONE BASKET EXTRACTION;  Surgeon: Colt Sanders MD;  Location: [facility] MAIN OR;  Service: Urology;  Laterality: Left;            Family History   Problem Relation Age of Onset    Heart failure Mother     Early death Mother     Asthma Sister     Depression Sister     Vision loss Sister     Other Sister     Cancer Maternal Aunt     Skin cancer Maternal Grandmother     Anxiety disorder Maternal Grandmother     Colon cancer Maternal Grandfather     Heart failure Maternal Grandfather     Diabetes Maternal Grandfather     Heart disease Maternal Grandfather     Hyperlipidemia Maternal Grandfather     Colon cancer Paternal Grandfather     Heart disease Maternal Great-Grandfather     Breast cancer Other 60    Ovarian cancer Neg Hx     Uterine cancer Neg Hx     Prostate cancer Neg Hx             Social History     Socioeconomic History    Marital status: Single   Tobacco Use    Smoking status: Never     Passive exposure: Yes    Smokeless tobacco: Never    Tobacco comments:     secondhand smoke exposure   Vaping Use    Vaping Use: Never used    Passive vaping exposure: Yes   Substance and Sexual Activity    Alcohol use: Not Currently    Drug use: No    Sexual activity: Yes     Partners: Male     Birth control/protection: I.U.D.     Comment: Mirena 4-            Allergies   Allergen Reactions    Ciprofloxacin Rash    Doxycycline Hyclate Rash    Doxycycline Monohydrate Rash    Latex Rash    Sulfa Antibiotics Rash     "Sulfamethoxazole-Trimethoprim Rash and Hives    Trimethoprim Rash            Current Outpatient Medications on File Prior to Visit   Medication Sig Dispense Refill    albuterol sulfate  (90 Base) MCG/ACT inhaler INHALE 2 PUFFS INTO THE LUNGS EVERY 4 (FOUR) HOURS AS NEEDED FOR SHORTNESS OF AIR.      bisoprolol (ZEBeta) 5 MG tablet Take 2.5 mg by mouth Daily.      famotidine (PEPCID) 20 MG tablet TAKE 1 TABLET BY MOUTH 2 TIMES A DAY 60 tablet 1    glucose blood test strip Use as instructed 60 each 0    glucose monitor monitoring kit 1 each As Needed (as needed to monitor daily blood glucose levels). 1 each 0    venlafaxine XR (EFFEXOR-XR) 75 MG 24 hr capsule Take 1 capsule by mouth Daily.      [DISCONTINUED] Blood Glucose Monitoring Suppl (ONE TOUCH ULTRA 2) w/Device kit       [DISCONTINUED] brompheniramine-pseudoephedrine-DM 30-2-10 MG/5ML syrup Take 10 mL by mouth 4 (Four) Times a Day As Needed for Cough or Congestion. 473 mL 0    [DISCONTINUED] docusate sodium (COLACE) 100 MG capsule Take 1 capsule by mouth 2 (Two) Times a Day. 28 capsule 0    [DISCONTINUED] fluticasone (FLONASE) 50 MCG/ACT nasal spray 2 sprays into the nostril(s) as directed by provider Daily. 1 g 0     No current facility-administered medications on file prior to visit.            /100 (BP Location: Right arm, Patient Position: Sitting, Cuff Size: Large Adult)   Pulse 95   Ht 162.6 cm (64\")   Wt 121 kg (266 lb 12.8 oz)   SpO2 96%   BMI 45.80 kg/m²          Objective     Physical Exam  Vitals and nursing note reviewed.   Constitutional:       Appearance: Normal appearance.   HENT:      Head: Normocephalic and atraumatic.   Eyes:      Conjunctiva/sclera: Conjunctivae normal.      Pupils: Pupils are equal, round, and reactive to light.   Cardiovascular:      Rate and Rhythm: Normal rate and regular rhythm.      Pulses: Normal pulses.      Heart sounds: Normal heart sounds.   Pulmonary:      Effort: Pulmonary effort is normal.      " Breath sounds: Normal breath sounds.   Abdominal:      General: Abdomen is flat.      Palpations: Abdomen is soft.   Musculoskeletal:         General: Normal range of motion.      Cervical back: Normal range of motion and neck supple.   Skin:     General: Skin is warm and dry.   Neurological:      General: No focal deficit present.      Mental Status: She is alert and oriented to person, place, and time. Mental status is at baseline.   Psychiatric:         Mood and Affect: Mood normal.         Behavior: Behavior normal.         Thought Content: Thought content normal.         Judgment: Judgment normal.            Procedures                        ED Course    [unfilled]         Lab Results (last 24 hours)       Procedure Component Value Units Date/Time    POC Glycosylated Hemoglobin (Hb A1C) [[id number]]  (Abnormal) Collected: 06/05/23 1033    Specimen: Blood Updated: 06/05/23 1034     Hemoglobin A1C 5.9 %      Lot Number [id number]     Expiration Date 1,242,025    CBC & Differential [[id number]] Collected: 06/05/23 1128    Specimen: Blood Updated: 06/05/23 1128    Narrative:      The following orders were created for panel order CBC & Differential.  Procedure                               Abnormality         Status                     ---------                               -----------         ------                     CBC Auto Differential[[id number]]                            In process                   Please view results for these tests on the individual orders.    Comprehensive Metabolic Panel [[id number]] Collected: 06/05/23 1128    Specimen: Blood Updated: 06/05/23 1128    Lipid Panel [[id number]] Collected: 06/05/23 1128    Specimen: Blood Updated: 06/05/23 1128    CBC Auto Differential [[id number]] Collected: 06/05/23 1128    Specimen: Blood Updated: 06/05/23 1128                      No Radiology Exams Resulted Within Past 24 Hours                             MDM                Diagnoses and all orders for  this visit:    1. Essential (primary) hypertension (Primary)  -     CBC & Differential; Future  -     Comprehensive Metabolic Panel; Future    2. Type 2 diabetes mellitus without complication, without long-term current use of insulin  Comments:  In office blood sugar levels are 5.9% discussed with her that she is borderline diabetic.  Orders:  -     POC Glycosylated Hemoglobin (Hb A1C)    3. Encounter for lipid screening for cardiovascular disease  -     Lipid Panel; Future    4. Class 3 severe obesity due to excess calories without serious comorbidity with body mass index (BMI) of 45.0 to 49.9 in adult  Comments:  Have started patient on Wegovy 0.25 mg she will return in 1 month for weight check.    Other orders  -     Semaglutide-Weight Management (Wegovy) 0.25 MG/0.5ML solution auto-injector; Inject 0.25 mg under the skin into the appropriate area as directed 1 (One) Time Per Week for 30 days.  Dispense: 2 mL; Refill: 0  -     lisinopril (PRINIVIL,ZESTRIL) 10 MG tablet; Take 1 tablet by mouth Daily for 30 days.  Dispense: 30 tablet; Refill: 0      1. Essential hypertension  Lisinopril prescribed. She will let me know in 1 week if it is not coming down enough, and dose will be increased. Recheck in 1 month.    2. Type 2 diabetes mellitus  Provider completed in EHR.    3. Encounter for lipid screening  Lipid panel ordered.    4. Class 3 severe obesity   Provider completed in EHR.  Will see if her insurance will cover Wegovy.  She will take 0.25 mg for 1 month and then increase to 0.5 mg.     Follow up in 1 month.            Additional Instructions for the Follow-ups that You Need to Schedule       CBC & Differential    Juan Daniel 10, 2023 (Approximate)      Manual Differential: No         Comprehensive Metabolic Panel    Aug 28, 2023 (Approximate)      Lipid Panel    Aug 28, 2023 (Approximate)                        FOR FULL DISCHARGE INSTRUCTIONS/COMMENTS/HANDOUTS please see the AVS      Transcribed from ambient  dictation for [facility] Chun by Reema Mckeon.  06/05/23   12:41 EDT    Patient or patient representative verbalized consent to the visit recording.  I have personally performed the services described in this document as transcribed by the above individual, and it is both accurate and complete.

## 2023-06-06 NOTE — PROGRESS NOTES
Triglycerides are elevated at 179, LDL is elevated at 117 working on diet by reducing trans fats and saturated fats would help with this and increasing omega-3 fatty acids.  Glucose was slightly elevated at 102 continue to work on carb intake by reducing amount of carbs.  All the labs are nonconcerning

## 2023-06-09 ENCOUNTER — PATIENT ROUNDING (BHMG ONLY) (OUTPATIENT)
Dept: FAMILY MEDICINE CLINIC | Facility: CLINIC | Age: 28
End: 2023-06-09
Payer: COMMERCIAL

## 2023-06-11 LAB
C TRACH RRNA UR QL NAA+PROBE: NEGATIVE
M GENITALIUM DNA UR QL NAA+PROBE: NEGATIVE
M HOMINIS DNA SPEC QL NAA+PROBE: NEGATIVE
N GONORRHOEA RRNA UR QL NAA+PROBE: NEGATIVE
UREAPLASMA DNA SPEC QL NAA+PROBE: POSITIVE

## 2023-06-12 ENCOUNTER — TELEPHONE (OUTPATIENT)
Dept: OBSTETRICS AND GYNECOLOGY | Facility: CLINIC | Age: 28
End: 2023-06-12
Payer: COMMERCIAL

## 2023-06-12 RX ORDER — AZITHROMYCIN 500 MG/1
1000 TABLET, FILM COATED ORAL ONCE
Qty: 2 TABLET | Refills: 0 | Status: SHIPPED | OUTPATIENT
Start: 2023-06-12 | End: 2023-06-12

## 2023-06-12 NOTE — TELEPHONE ENCOUNTER
----- Message from [facility] Saleem sent at 6/12/2023 12:42 PM EDT -----  A one-time dose of zithromax has been sent. Thanks   ----- Message -----  From: Lester Duke MA  Sent: 6/12/2023  12:36 PM EDT  To: [facility] Saleem    Discussed results with patient. Patient is having symptoms at this time and would like something called in to her pharmacy.

## 2023-06-12 NOTE — PROGRESS NOTES
Discussed results with patient. Patient is having symptoms at this time and would like something called in to her pharmacy.

## 2023-06-14 ENCOUNTER — PROCEDURE VISIT (OUTPATIENT)
Dept: OBSTETRICS AND GYNECOLOGY | Facility: CLINIC | Age: 28
End: 2023-06-14
Payer: COMMERCIAL

## 2023-06-14 VITALS
BODY MASS INDEX: 44.56 KG/M2 | DIASTOLIC BLOOD PRESSURE: 82 MMHG | HEART RATE: 106 BPM | SYSTOLIC BLOOD PRESSURE: 128 MMHG | WEIGHT: 261 LBS | HEIGHT: 64 IN

## 2023-06-14 DIAGNOSIS — Z30.432 ENCOUNTER FOR REMOVAL OF INTRAUTERINE CONTRACEPTIVE DEVICE (IUD): Primary | ICD-10-CM

## 2023-06-14 NOTE — PROGRESS NOTES
IUD Removal    Kinsey Suresh  Date of procedure:  6/14/2023    Risks and benefits discussed? yes  All questions answered? yes  Consents given by The patient  Written consent obtained? yes  Reason for removal: Side effect: weight gain, frequent spotting    Local anesthesia used:  no    Procedure documentation:    A speculum was placed in order to view the cervix.  A tenaculum did not need to be placed on the anterior cervical lip.  Cervical dilation did not need to be performed in order to access the string.  The IUD string was easily seen.  The string was grasped and the IUD was removed without difficulty.  The IUD did not appear to be adherent to the uterine cavity. It was removed intact.    She tolerated the procedure without any difficulty.     Post procedure instructions: Patient notified to call with heavy bleeding, fever or increasing pain. Pt was initially scheduled for mirena removal and kyleena insertion to see if the side effects were better with something lower hormone dose. Pt decided to only have mirena removed for now and declines kyleena insertion or any other bc options. Does have a hx of heavy menses so we discussed monitoring sx and f/u prn. We discussed all bc options. Also discussed condom use and pnv.     Follow up Return if symptoms worsen or fail to improve.

## 2023-06-23 PROBLEM — L98.8 SKIN LESION OF BREAST: Status: ACTIVE | Noted: 2023-06-23

## 2023-07-10 NOTE — PROGRESS NOTES
"Post-op Follow-up (1 week post op)    Subjective            History of Present Illness  Kinsey Suresh is a 28 y.o. female who presents to [facility]ON[facility] SURGERY as Postoperative Follow-Up from bilateral breast reduction and EXCISION OF LEFT UPPER CHEST MOLE 7/11/23.    Has a right breast small open area on incision line with yellowish, pink tinge color since a couple days after surgery      Path showed...  Final Diagnosis   1. Skin, left upper chest mole, biopsy:               - Compound nevus       Allergies: Ciprofloxacin, Doxycycline hyclate, Doxycycline monohydrate, Latex, Sulfa antibiotics, Sulfamethoxazole-trimethoprim, and Trimethoprim  Allergies Reconciled.    Review of Systems   All review of system has been reviewed and it  is negative except the ones note above.     Objective     Ht 162.6 cm (64.02\")   BMI 46.03 kg/m²     Body mass index is 46.03 kg/m².    Physical Exam   Cardiovascular: Normal rate.     Pulmonary/Chest  Effort normal.     Cardiovascular: Normal rate    Pulmonary/Chest: Effort normal    Breast: Glue strips in place, breast soft, good symmetry, expected ecchymosis and edema, nipples viable.                                                              Result Review :                Assessment and Plan      There are no diagnoses linked to this encounter.    Plan: Can return to work 2 weeks post op, no lifting, RTO 3wks for 1m post op follow up     Scribed by Alba Conn, acting as a scribe for Mariah Lieberman MD, 07/19/23 14:39 EDT.  Mariah Lieberman MD's signature on the note affirms that the note adequately documents the care provided.            Follow Up     No follow-ups on file.    Patient was given instructions and counseling regarding her condition. Please see specific information pulled into the AVS if appropriate.     Bailee Meneses MA  07/19/2023   "

## 2023-07-19 ENCOUNTER — OFFICE VISIT (OUTPATIENT)
Dept: PLASTIC SURGERY | Facility: CLINIC | Age: 28
End: 2023-07-19
Payer: COMMERCIAL

## 2023-07-19 VITALS
SYSTOLIC BLOOD PRESSURE: 124 MMHG | DIASTOLIC BLOOD PRESSURE: 85 MMHG | OXYGEN SATURATION: 96 % | WEIGHT: 265.2 LBS | TEMPERATURE: 98.6 F | HEIGHT: 64 IN | HEART RATE: 112 BPM | BODY MASS INDEX: 45.27 KG/M2

## 2023-07-19 DIAGNOSIS — Z09 POSTOPERATIVE FOLLOW-UP: Primary | ICD-10-CM

## 2023-07-19 DIAGNOSIS — N62 MACROMASTIA: ICD-10-CM

## 2023-07-25 PROCEDURE — 87186 SC STD MICRODIL/AGAR DIL: CPT | Performed by: FAMILY MEDICINE

## 2023-07-25 PROCEDURE — 87205 SMEAR GRAM STAIN: CPT | Performed by: FAMILY MEDICINE

## 2023-07-25 PROCEDURE — 87147 CULTURE TYPE IMMUNOLOGIC: CPT | Performed by: FAMILY MEDICINE

## 2023-07-25 PROCEDURE — 87070 CULTURE OTHR SPECIMN AEROBIC: CPT | Performed by: FAMILY MEDICINE

## 2023-07-28 ENCOUNTER — TELEPHONE (OUTPATIENT)
Dept: PLASTIC SURGERY | Facility: CLINIC | Age: 28
End: 2023-07-28
Payer: COMMERCIAL

## 2023-07-28 NOTE — TELEPHONE ENCOUNTER
Respond to the patient's MyChart request after reviewing her pictures.  After responding, I realized that she was already on clindamycin.  The patient was called and I had a conversation with the patient.  The pictures show some reactive erythema and some superficial wound edge separation.  Patient encouraged to continue local wound care as well as continue taking the clindamycin she was prescribed by Dr. Lieberman.   Patient asked to come in to be seen in clinic on 1 August, Tuesday between 8 and 9 AM

## 2023-07-30 ENCOUNTER — TELEPHONE (OUTPATIENT)
Dept: URGENT CARE | Facility: CLINIC | Age: 28
End: 2023-07-30
Payer: COMMERCIAL

## 2023-07-30 DIAGNOSIS — L08.9 WOUND INFECTION: Primary | ICD-10-CM

## 2023-07-30 DIAGNOSIS — T14.8XXA WOUND INFECTION: Primary | ICD-10-CM

## 2023-07-30 RX ORDER — VANCOMYCIN HYDROCHLORIDE 125 MG/1
125 CAPSULE ORAL 4 TIMES DAILY
Qty: 28 CAPSULE | Refills: 0 | Status: SHIPPED | OUTPATIENT
Start: 2023-07-30 | End: 2023-08-06

## 2023-07-30 NOTE — TELEPHONE ENCOUNTER
Called patient to review wound culture results, susceptibility report clearly states this infection is resistant to clindamycin.  She states that the affected area has not gotten any better and is still red and painful.  The patient is allergic to Bactrim and doxycycline.  Will have the patient's stop the clindamycin and start oral vancomycin.  The patient is scheduled to see plastic surgery on Tuesday and will have them advise further treatment.  Patient to go to ED if symptoms worsen.  She verbalizes understanding.

## 2023-08-01 ENCOUNTER — OFFICE VISIT (OUTPATIENT)
Dept: PLASTIC SURGERY | Facility: CLINIC | Age: 28
End: 2023-08-01
Payer: COMMERCIAL

## 2023-08-01 VITALS
BODY MASS INDEX: 45.41 KG/M2 | WEIGHT: 266 LBS | HEIGHT: 64 IN | HEART RATE: 72 BPM | TEMPERATURE: 98 F | SYSTOLIC BLOOD PRESSURE: 112 MMHG | OXYGEN SATURATION: 96 % | DIASTOLIC BLOOD PRESSURE: 73 MMHG

## 2023-08-01 DIAGNOSIS — Z09 POSTOPERATIVE FOLLOW-UP: Primary | ICD-10-CM

## 2023-08-01 PROCEDURE — 99024 POSTOP FOLLOW-UP VISIT: CPT | Performed by: SURGERY

## 2023-08-03 RX ORDER — LISINOPRIL 10 MG/1
TABLET ORAL
Qty: 30 TABLET | Refills: 0 | Status: SHIPPED | OUTPATIENT
Start: 2023-08-03

## 2023-08-04 NOTE — PROGRESS NOTES
"Post-op Follow-up (1 month post op)    Subjective            History of Present Illness  Kinsey Suresh is a 28 y.o. female who presents to [facility] SURGERY as Postoperative Follow-Up from bilateral breast reduction and EXCISION OF LEFT UPPER CHEST MOLE 7/11/23.    She is 1 month post op. Still has some redness on both breasts. Denies pain. Is on pain medication for kidney stones. Is having surgery tomorrow. Happy with results so far.      Allergies: Ciprofloxacin, Doxycycline hyclate, Doxycycline monohydrate, Latex, Sulfa antibiotics, Sulfamethoxazole-trimethoprim, and Trimethoprim  Allergies Reconciled.    Review of Systems   All review of system has been reviewed and it  is negative except the ones note above.     Objective     /77 (BP Location: Right arm, Patient Position: Sitting, Cuff Size: Large Adult)   Pulse 94   Temp 98.6 øF (37 øC) (Temporal)   Ht 162.6 cm (64.02\")   Wt 120 kg (264 lb 6.4 oz)   SpO2 98%   BMI 45.36 kg/mý     Body mass index is 45.36 kg/mý.    Physical Exam   Cardiovascular: Normal rate.     Pulmonary/Chest  Effort normal.     Cardiovascular: Normal rate    Pulmonary/Chest: Effort normal    Breast: scattered scabbed area with resolving spitting sutures on bilateral breasts, denies pain    Neck: left side of neck spitting suture present, no open areas or drainage.                                                         Result Review :       Assessment and Plan      Diagnoses and all orders for this visit:    1. Macromastia (Primary)          Plan:   Photos obtained today. Removed spitting sutures off of bilateral breasts and neck incision. Applied antibiotic ointment. Continue applying vaseline or Aquaphor with scar massage to bilateral breast incisions. RTC 6 month post op. Patient is aware to call us with any other concerns.    Scribed by Bailee Meneses MA, acting as a scribe for Mariah Lieberman MD, 08/17/23 08:26 EDT.  " Mariah Lieberman MD's signature on the note affirms that the note adequately documents the care provided.      Patient was given instructions and counseling regarding her condition. Please see specific information pulled into the AVS if appropriate.     Mariah Lieberman MD  08/17/2023

## 2023-08-10 ENCOUNTER — OFFICE VISIT (OUTPATIENT)
Dept: OBSTETRICS AND GYNECOLOGY | Facility: CLINIC | Age: 28
End: 2023-08-10
Payer: COMMERCIAL

## 2023-08-10 VITALS
DIASTOLIC BLOOD PRESSURE: 77 MMHG | WEIGHT: 270 LBS | HEART RATE: 89 BPM | HEIGHT: 64 IN | BODY MASS INDEX: 46.1 KG/M2 | SYSTOLIC BLOOD PRESSURE: 124 MMHG

## 2023-08-10 DIAGNOSIS — R35.0 URINE FREQUENCY: Primary | ICD-10-CM

## 2023-08-10 DIAGNOSIS — N89.8 VAGINAL ITCHING: ICD-10-CM

## 2023-08-10 DIAGNOSIS — Z30.09 ENCOUNTER FOR OTHER GENERAL COUNSELING OR ADVICE ON CONTRACEPTION: ICD-10-CM

## 2023-08-10 DIAGNOSIS — N89.8 VAGINAL DISCHARGE: ICD-10-CM

## 2023-08-10 LAB
BILIRUB BLD-MCNC: NEGATIVE MG/DL
GLUCOSE UR STRIP-MCNC: NEGATIVE MG/DL
KETONES UR QL: NEGATIVE
LEUKOCYTE EST, POC: ABNORMAL
NITRITE UR-MCNC: NEGATIVE MG/ML
PH UR: 6.5 [PH] (ref 5–8)
PROT UR STRIP-MCNC: NEGATIVE MG/DL
RBC # UR STRIP: NEGATIVE /UL
SP GR UR: 1.02 (ref 1–1.03)
UROBILINOGEN UR QL: NORMAL

## 2023-08-10 PROCEDURE — 87086 URINE CULTURE/COLONY COUNT: CPT | Performed by: OBSTETRICS & GYNECOLOGY

## 2023-08-10 RX ORDER — FLUCONAZOLE 150 MG/1
150 TABLET ORAL
Qty: 2 TABLET | Refills: 0 | Status: SHIPPED | OUTPATIENT
Start: 2023-08-10 | End: 2023-08-14

## 2023-08-10 RX ORDER — NITROFURANTOIN 25; 75 MG/1; MG/1
100 CAPSULE ORAL 2 TIMES DAILY
Qty: 10 CAPSULE | Refills: 0 | Status: SHIPPED | OUTPATIENT
Start: 2023-08-10 | End: 2023-08-15

## 2023-08-10 NOTE — PROGRESS NOTES
"GYN Problem/Follow Up Visit    Chief Complaint   Patient presents with    Vaginal Pain    Urinary Frequency           HPI  Kinsey Suresh is a 28 y.o. female, , who presents for urinary frequency x 2 days. Also c/o itching and irritation inside her vagina. Has been on antibiotics recently after her breast reduction surgery. Also wants kyleena iud.       Additional OB/GYN History   No LMP recorded. Patient has had an implant.  Current contraception: contraceptive methods: None  Desires to: start contraception  Allergies : Ciprofloxacin, Doxycycline hyclate, Doxycycline monohydrate, Latex, Sulfa antibiotics, Sulfamethoxazole-trimethoprim, and Trimethoprim     The additional following portions of the patient's history were reviewed and updated as appropriate: allergies, current medications, past family history, past medical history, past social history, past surgical history, and problem list.    Review of Systems    I have reviewed and agree with the HPI, ROS, and historical information as entered above. Felicita Edward, APRN    Objective   /77   Pulse 89   Ht 162.6 cm (64\")   Wt 122 kg (270 lb)   BMI 46.35 kg/mý     Physical Exam  Vitals reviewed.   Genitourinary:     General: Normal vulva.      Vagina: No signs of injury and foreign body. Vaginal discharge (thin white), erythema and tenderness present. No bleeding, lesions or prolapsed vaginal walls.      Cervix: Discharge and erythema present. No cervical motion tenderness, friability, lesion or cervical bleeding.   Skin:     General: Skin is warm and dry.   Neurological:      Mental Status: She is alert and oriented to person, place, and time.          Assessment and Plan    Diagnoses and all orders for this visit:    1. Urine frequency (Primary)  -     POC Urinalysis Dipstick  -     nitrofurantoin, macrocrystal-monohydrate, (Macrobid) 100 MG capsule; Take 1 capsule by mouth 2 (Two) Times a Day for 5 days.  Dispense: 10 capsule; Refill: 0  -     " Urine Culture - Urine, Urine, Clean Catch    2. Vaginal discharge  -     fluconazole (Diflucan) 150 MG tablet; Take 1 tablet by mouth Every 72 (Seventy-Two) Hours for 2 doses.  Dispense: 2 tablet; Refill: 0  -     NuSwab BV & Candida - Swab, Vagina    3. Vaginal itching  -     fluconazole (Diflucan) 150 MG tablet; Take 1 tablet by mouth Every 72 (Seventy-Two) Hours for 2 doses.  Dispense: 2 tablet; Refill: 0  -     NuSwab BV & Candida - Swab, Vagina    4. Encounter for other general counseling or advice on contraception  -     hCG, Quantitative, Pregnancy; Future    Urine multistick shows 2+leuk. Will send for culture. Macrobid. Increase fluids. Will check for vaginal infections since there was d/c present on exam and she has been having itching. Diflucan sent due to recent antibiotic use. Discussed kyleena. No intercourse x 2 weeks before appt. Beta day before appt.     Counseling:  She understands the importance of having the above orders performed in a timely fashion.  She is encouraged to review her results online and/or contact or office if she has questions.     Follow Up:  Return for kyleena insertion, beta day before.      Felicita Edward, APRN  08/10/2023

## 2023-08-11 LAB
A VAGINAE DNA VAG QL NAA+PROBE: NORMAL SCORE
BVAB2 DNA VAG QL NAA+PROBE: NORMAL SCORE
C ALBICANS DNA VAG QL NAA+PROBE: NEGATIVE
C GLABRATA DNA VAG QL NAA+PROBE: NEGATIVE
MEGA1 DNA VAG QL NAA+PROBE: NORMAL SCORE

## 2023-08-12 LAB — BACTERIA SPEC AEROBE CULT: NORMAL

## 2023-08-15 ENCOUNTER — APPOINTMENT (OUTPATIENT)
Dept: CT IMAGING | Facility: HOSPITAL | Age: 28
End: 2023-08-15
Payer: COMMERCIAL

## 2023-08-15 ENCOUNTER — HOSPITAL ENCOUNTER (EMERGENCY)
Facility: HOSPITAL | Age: 28
Discharge: HOME | End: 2023-08-16
Attending: EMERGENCY MEDICINE
Payer: COMMERCIAL

## 2023-08-15 DIAGNOSIS — N20.1 URETEROLITHIASIS: Primary | ICD-10-CM

## 2023-08-15 LAB
ALBUMIN SERPL-MCNC: 3.9 G/DL (ref 3.5–5.2)
ALBUMIN/GLOB SERPL: 1.4 G/DL
ALP SERPL-CCNC: 120 U/L (ref 39–117)
ALT SERPL W P-5'-P-CCNC: 15 U/L (ref 1–33)
ANION GAP SERPL CALCULATED.3IONS-SCNC: 12.8 MMOL/L (ref 5–15)
AST SERPL-CCNC: 12 U/L (ref 1–32)
BACTERIA UR QL AUTO: ABNORMAL /HPF
BASOPHILS # BLD AUTO: 0.07 10*3/MM3 (ref 0–0.2)
BASOPHILS NFR BLD AUTO: 0.8 % (ref 0–1.5)
BILIRUB SERPL-MCNC: 0.2 MG/DL (ref 0–1.2)
BILIRUB UR QL STRIP: ABNORMAL
BUN SERPL-MCNC: 9 MG/DL (ref 6–20)
BUN/CREAT SERPL: 9.5 (ref 7–25)
CALCIUM SPEC-SCNC: 9.1 MG/DL (ref 8.6–10.5)
CHLORIDE SERPL-SCNC: 105 MMOL/L (ref 98–107)
CLARITY UR: ABNORMAL
CO2 SERPL-SCNC: 23.2 MMOL/L (ref 22–29)
COLOR UR: ABNORMAL
CREAT SERPL-MCNC: 0.95 MG/DL (ref 0.57–1)
DEPRECATED RDW RBC AUTO: 39.2 FL (ref 37–54)
EGFRCR SERPLBLD CKD-EPI 2021: 84.9 ML/MIN/1.73
EOSINOPHIL # BLD AUTO: 0.22 10*3/MM3 (ref 0–0.4)
EOSINOPHIL NFR BLD AUTO: 2.6 % (ref 0.3–6.2)
ERYTHROCYTE [DISTWIDTH] IN BLOOD BY AUTOMATED COUNT: 12.1 % (ref 12.3–15.4)
GLOBULIN UR ELPH-MCNC: 2.8 GM/DL
GLUCOSE SERPL-MCNC: 163 MG/DL (ref 65–99)
GLUCOSE UR STRIP-MCNC: ABNORMAL MG/DL
HCG INTACT+B SERPL-ACNC: <0.5 MIU/ML
HCT VFR BLD AUTO: 41.9 % (ref 34–46.6)
HGB BLD-MCNC: 13.8 G/DL (ref 12–15.9)
HGB UR QL STRIP.AUTO: ABNORMAL
HOLD SPECIMEN: NORMAL
HOLD SPECIMEN: NORMAL
HYALINE CASTS UR QL AUTO: ABNORMAL /LPF
IMM GRANULOCYTES # BLD AUTO: 0.03 10*3/MM3 (ref 0–0.05)
IMM GRANULOCYTES NFR BLD AUTO: 0.3 % (ref 0–0.5)
KETONES UR QL STRIP: ABNORMAL
LEUKOCYTE ESTERASE UR QL STRIP.AUTO: ABNORMAL
LIPASE SERPL-CCNC: 45 U/L (ref 13–60)
LYMPHOCYTES # BLD AUTO: 2.69 10*3/MM3 (ref 0.7–3.1)
LYMPHOCYTES NFR BLD AUTO: 31.2 % (ref 19.6–45.3)
MCH RBC QN AUTO: 28.8 PG (ref 26.6–33)
MCHC RBC AUTO-ENTMCNC: 32.9 G/DL (ref 31.5–35.7)
MCV RBC AUTO: 87.3 FL (ref 79–97)
MONOCYTES # BLD AUTO: 0.49 10*3/MM3 (ref 0.1–0.9)
MONOCYTES NFR BLD AUTO: 5.7 % (ref 5–12)
NEUTROPHILS NFR BLD AUTO: 5.12 10*3/MM3 (ref 1.7–7)
NEUTROPHILS NFR BLD AUTO: 59.4 % (ref 42.7–76)
NITRITE UR QL STRIP: ABNORMAL
NRBC BLD AUTO-RTO: 0 /100 WBC (ref 0–0.2)
PH UR STRIP.AUTO: ABNORMAL [PH]
PLATELET # BLD AUTO: 260 10*3/MM3 (ref 140–450)
PMV BLD AUTO: 9.8 FL (ref 6–12)
POTASSIUM SERPL-SCNC: 3.8 MMOL/L (ref 3.5–5.2)
PROT SERPL-MCNC: 6.7 G/DL (ref 6–8.5)
PROT UR QL STRIP: ABNORMAL
RBC # BLD AUTO: 4.8 10*6/MM3 (ref 3.77–5.28)
RBC # UR STRIP: ABNORMAL /HPF
REF LAB TEST METHOD: ABNORMAL
SODIUM SERPL-SCNC: 141 MMOL/L (ref 136–145)
SP GR UR STRIP: >=1.03 (ref 1–1.03)
SQUAMOUS #/AREA URNS HPF: ABNORMAL /HPF
UROBILINOGEN UR QL STRIP: ABNORMAL
WBC # UR STRIP: ABNORMAL /HPF
WBC NRBC COR # BLD: 8.62 10*3/MM3 (ref 3.4–10.8)
WHOLE BLOOD HOLD COAG: NORMAL
WHOLE BLOOD HOLD SPECIMEN: NORMAL

## 2023-08-15 PROCEDURE — 36415 COLL VENOUS BLD VENIPUNCTURE: CPT

## 2023-08-15 PROCEDURE — 96375 TX/PRO/DX INJ NEW DRUG ADDON: CPT

## 2023-08-15 PROCEDURE — 96374 THER/PROPH/DIAG INJ IV PUSH: CPT

## 2023-08-15 PROCEDURE — 25010000002 KETOROLAC TROMETHAMINE PER 15 MG: Performed by: BEHAVIOR TECHNICIAN

## 2023-08-15 PROCEDURE — 25010000002 ONDANSETRON PER 1 MG: Performed by: BEHAVIOR TECHNICIAN

## 2023-08-15 PROCEDURE — 74177 CT ABD & PELVIS W/CONTRAST: CPT

## 2023-08-15 PROCEDURE — 84702 CHORIONIC GONADOTROPIN TEST: CPT | Performed by: EMERGENCY MEDICINE

## 2023-08-15 PROCEDURE — 80053 COMPREHEN METABOLIC PANEL: CPT | Performed by: EMERGENCY MEDICINE

## 2023-08-15 PROCEDURE — 25510000001 IOPAMIDOL PER 1 ML: Performed by: EMERGENCY MEDICINE

## 2023-08-15 PROCEDURE — 85025 COMPLETE CBC W/AUTO DIFF WBC: CPT

## 2023-08-15 PROCEDURE — 99285 EMERGENCY DEPT VISIT HI MDM: CPT

## 2023-08-15 PROCEDURE — 81001 URINALYSIS AUTO W/SCOPE: CPT | Performed by: EMERGENCY MEDICINE

## 2023-08-15 PROCEDURE — 83690 ASSAY OF LIPASE: CPT | Performed by: EMERGENCY MEDICINE

## 2023-08-15 RX ORDER — ONDANSETRON 2 MG/ML
4 INJECTION INTRAMUSCULAR; INTRAVENOUS ONCE
Status: COMPLETED | OUTPATIENT
Start: 2023-08-15 | End: 2023-08-15

## 2023-08-15 RX ORDER — KETOROLAC TROMETHAMINE 30 MG/ML
30 INJECTION, SOLUTION INTRAMUSCULAR; INTRAVENOUS ONCE
Status: COMPLETED | OUTPATIENT
Start: 2023-08-15 | End: 2023-08-15

## 2023-08-15 RX ORDER — SODIUM CHLORIDE 0.9 % (FLUSH) 0.9 %
10 SYRINGE (ML) INJECTION AS NEEDED
Status: DISCONTINUED | OUTPATIENT
Start: 2023-08-15 | End: 2023-08-16 | Stop reason: HOSPADM

## 2023-08-15 RX ADMIN — ONDANSETRON 4 MG: 2 INJECTION INTRAMUSCULAR; INTRAVENOUS at 22:52

## 2023-08-15 RX ADMIN — KETOROLAC TROMETHAMINE 30 MG: 30 INJECTION, SOLUTION INTRAMUSCULAR; INTRAVENOUS at 22:52

## 2023-08-15 RX ADMIN — IOPAMIDOL 96 ML: 755 INJECTION, SOLUTION INTRAVENOUS at 23:25

## 2023-08-15 NOTE — Clinical Note
[facility] EMERGENCY ROOM  [address]  [place] [address]  Phone: [phone]    Kinsey Suresh was seen and treated in our emergency department on 8/15/2023.  She may return to work on 08/19/2023.         Thank you for choosing [facility].    Shabbir, Yusra, PA

## 2023-08-15 NOTE — Clinical Note
[facility] EMERGENCY ROOM  [address]  [place] [address]  Phone: [phone]    Kinsey Suresh was seen and treated in our emergency department on 8/15/2023.  She may return to work on 08/19/2023.         Thank you for choosing [facility].    Shabbir, Yusra, PA

## 2023-08-15 NOTE — Clinical Note
[facility] EMERGENCY ROOM  [address]  [place] [address]  Phone: [phone]    Kinsey Suresh was seen and treated in our emergency department on 8/15/2023.  She may return to work on 08/18/2023.         Thank you for choosing [facility].    Srikanth De La Rosa, DO

## 2023-08-16 ENCOUNTER — TELEPHONE (OUTPATIENT)
Dept: UROLOGY | Facility: CLINIC | Age: 28
End: 2023-08-16
Payer: COMMERCIAL

## 2023-08-16 ENCOUNTER — PREP FOR SURGERY (OUTPATIENT)
Dept: OTHER | Facility: HOSPITAL | Age: 28
End: 2023-08-16
Payer: COMMERCIAL

## 2023-08-16 VITALS
SYSTOLIC BLOOD PRESSURE: 128 MMHG | HEART RATE: 84 BPM | BODY MASS INDEX: 45.62 KG/M2 | HEIGHT: 64 IN | RESPIRATION RATE: 17 BRPM | OXYGEN SATURATION: 99 % | TEMPERATURE: 98 F | DIASTOLIC BLOOD PRESSURE: 72 MMHG | WEIGHT: 267.2 LBS

## 2023-08-16 DIAGNOSIS — N20.1 RIGHT URETERAL STONE: Primary | ICD-10-CM

## 2023-08-16 LAB
BACTERIA UR QL AUTO: ABNORMAL /HPF
BILIRUB UR QL STRIP: NEGATIVE
CLARITY UR: CLEAR
COLOR UR: YELLOW
GLUCOSE UR STRIP-MCNC: NEGATIVE MG/DL
HGB UR QL STRIP.AUTO: ABNORMAL
HYALINE CASTS UR QL AUTO: ABNORMAL /LPF
KETONES UR QL STRIP: NEGATIVE
LEUKOCYTE ESTERASE UR QL STRIP.AUTO: NEGATIVE
NITRITE UR QL STRIP: NEGATIVE
PH UR STRIP.AUTO: 7 [PH] (ref 5–8)
PROT UR QL STRIP: NEGATIVE
RBC # UR STRIP: ABNORMAL /HPF
REF LAB TEST METHOD: ABNORMAL
SP GR UR STRIP: >1.03 (ref 1–1.03)
SQUAMOUS #/AREA URNS HPF: ABNORMAL /HPF
UROBILINOGEN UR QL STRIP: ABNORMAL
WBC # UR STRIP: ABNORMAL /HPF

## 2023-08-16 PROCEDURE — P9612 CATHETERIZE FOR URINE SPEC: HCPCS

## 2023-08-16 PROCEDURE — 81001 URINALYSIS AUTO W/SCOPE: CPT | Performed by: BEHAVIOR TECHNICIAN

## 2023-08-16 RX ORDER — TAMSULOSIN HYDROCHLORIDE 0.4 MG/1
1 CAPSULE ORAL DAILY
Qty: 30 CAPSULE | Refills: 0 | Status: SHIPPED | OUTPATIENT
Start: 2023-08-16

## 2023-08-16 RX ORDER — HYDROCODONE BITARTRATE AND ACETAMINOPHEN 5; 325 MG/1; MG/1
1 TABLET ORAL EVERY 6 HOURS PRN
Qty: 12 TABLET | Refills: 0 | Status: SHIPPED | OUTPATIENT
Start: 2023-08-16 | End: 2023-08-18 | Stop reason: HOSPADM

## 2023-08-16 RX ORDER — ONDANSETRON 4 MG/1
4 TABLET, ORALLY DISINTEGRATING ORAL EVERY 8 HOURS PRN
Qty: 15 TABLET | Refills: 0 | Status: ON HOLD | OUTPATIENT
Start: 2023-08-16 | End: 2023-08-18 | Stop reason: SDUPTHER

## 2023-08-16 NOTE — ED PROVIDER NOTES
Time: 10:01 PM EDT  Date of encounter:  8/15/2023  Independent Historian/Clinical History and Information was obtained by:   Patient    History is limited by: N/A    Chief Complaint   Patient presents with    Abdominal Pain         History of Present Illness:  Patient is a 28 y.o. year old female who presents to the emergency department for evaluation of right flank pain.  Patient denies radiation of flank pain to her back or abdomen.  Patient states the flank pain started at 9 AM today.  Patient admits nausea without vomiting.  Patient denies fever, chills.  Patient does admit to intermittent diarrhea.  Patient states that she is currently on her menstrual cycle.  Patient does admit to dysuria but denies urinary frequency or hematuria.  Patient denies shortness of breath or chest pain.    HPI    Patient Care Team  Primary Care Provider: Meredith Alarcon APRN    Past Medical History:     Allergies   Allergen Reactions    Ciprofloxacin Rash    Doxycycline Hyclate Rash    Doxycycline Monohydrate Rash    Latex Rash    Sulfa Antibiotics Rash    Sulfamethoxazole-Trimethoprim Hives and Rash    Trimethoprim Rash     Past Medical History:   Diagnosis Date    Anxiety     Asthma     PRN INHALERS    Borderline diabetic     NO MEDICATIONS AS OF YET TRYING CHANGE OF DIET    Clostridium difficile diarrhea 01/30/2022    DENIES ANY CURRENT ISSUES    Depression     GERD (gastroesophageal reflux disease)     HL (hearing loss)     Hypertension     Irritable bowel syndrome     Kidney stones     Migraine without aura     Obesity     Ovarian cyst     right ovary     PONV (postoperative nausea and vomiting)     PVC (premature ventricular contraction)     FOLLOWED BY DR FIGUEROA HAS SCHEDULED ECHO ON 7/3/23 . REPORTS ISSUES WITH UPPER LEFT CHEST PAIN BUT STATES IS SORE WITH PALPATION ALSO REPORTS SOA WITH EXERTION. ACTIVE WORKS FULL TIME    Right ventricular cardiac abnormality     DILATATION PER DR FIGUEROA NOTE    Seasonal allergies      Sleep apnea     DOES NOT USE CPAP    Urinary tract infection     NO CURRENT ISSUES     Past Surgical History:   Procedure Laterality Date    BILATERAL BREAST REDUCTION Bilateral 7/11/2023    Procedure: BREAST REDUCTION BILATERAL EXCISION OF LEFT UPPER CHEST MOLE;  Surgeon: Mariah Lieberman MD;  [place] [facility] [place] [facility];  Service: Plastics;  Laterality: Bilateral;    COLONOSCOPY  2020    COLONOSCOPY N/A 7/11/2022    Procedure: COLONOSCOPY BIOPSY;  Surgeon: David Yip MD;  Location: [facility] ENDOSCOPY;  Service: Gastroenterology;  Laterality: N/A;  NORMAL COLON    ENDOSCOPY      EXCISION LESION Left 7/11/2023    Procedure: BREAST REDUCTION BILATERAL EXCISION OF LEFT UPPER CHEST MOLE;  Surgeon: Mariah Lieberman MD;  Location[place] [facility] [place] [facility];  Service: Plastics;  Laterality: Left;    KIDNEY STONE SURGERY  11/2021    URETEROSCOPY LASER LITHOTRIPSY WITH STENT INSERTION Left 11/13/2021    Procedure: URETEROSCOPY LASER LITHOTRIPSY WITH STENT INSERTION STONE BASKET EXTRACTION;  Surgeon: Colt Sanders MD;  Location: [facility] MAIN OR;  Service: Urology;  Laterality: Left;     Family History   Problem Relation Age of Onset    Heart failure Mother     Early death Mother     Asthma Sister     Depression Sister     Vision loss Sister     Other Sister     Cancer Maternal Aunt     Skin cancer Maternal Grandmother     Anxiety disorder Maternal Grandmother     Colon cancer Maternal Grandfather     Heart failure Maternal Grandfather     Diabetes Maternal Grandfather     Heart disease Maternal Grandfather     Hyperlipidemia Maternal Grandfather     Colon cancer Paternal Grandfather     Heart disease Maternal Great-Grandfather     Breast cancer Other 60    Ovarian cancer Neg Hx     Uterine cancer Neg Hx     Prostate cancer Neg Hx        Home Medications:  Prior to Admission medications    Medication Sig Start Date End Date Taking? Authorizing Provider   albuterol sulfate  (90 Base) MCG/ACT inhaler  INHALE 2 PUFFS INTO THE LUNGS EVERY 4 (FOUR) HOURS AS NEEDED FOR SHORTNESS OF AIR. 8/22/22   ProviderEric MD   bisoprolol (ZEBeta) 5 MG tablet Take 0.5 tablets by mouth Daily.    Provider, MD Eric   fluconazole (Diflucan) 150 MG tablet Take 1 tablet by mouth Every 72 (Seventy-Two) Hours for 2 doses. 8/10/23 8/14/23  Felicita Edward APRN   glucose blood test strip Use as instructed 3/20/23   Felicita Marcus APRN   glucose monitor monitoring kit 1 each As Needed (as needed to monitor daily blood glucose levels). 3/20/23   Felicita Marcus APRN   lisinopril (PRINIVIL,ZESTRIL) 10 MG tablet TAKE 1 TABLET BY MOUTH EVERY DAY 8/3/23   Meredith Alarcon APRN   mupirocin (BACTROBAN) 2 % ointment Apply 1 application  topically to the appropriate area as directed 3 (Three) Times a Day.  Patient not taking: Reported on 8/1/2023 7/25/23   Tod Carl MD   nitrofurantoin, macrocrystal-monohydrate, (Macrobid) 100 MG capsule Take 1 capsule by mouth 2 (Two) Times a Day for 5 days. 8/10/23 8/15/23  Felicita Edward APRN   fluticasone (FLONASE) 50 MCG/ACT nasal spray 2 sprays into the nostril(s) as directed by provider Daily. 7/25/21 9/19/21  Rosio Mccoy APRN        Social History:   Social History     Tobacco Use    Smoking status: Never     Passive exposure: Yes    Smokeless tobacco: Never    Tobacco comments:     secondhand smoke exposure   Vaping Use    Vaping Use: Never used    Passive vaping exposure: Yes   Substance Use Topics    Alcohol use: Never    Drug use: Never         Review of Systems:  Review of Systems   Constitutional:  Negative for chills and fever.   Respiratory:  Negative for shortness of breath.    Cardiovascular:  Negative for chest pain.   Gastrointestinal:  Positive for diarrhea and nausea. Negative for abdominal pain and vomiting.   Genitourinary:  Positive for dysuria and flank pain. Negative for hematuria.   Musculoskeletal:  Negative for back pain.      Physical  "Exam:  /72 (BP Location: Right arm, Patient Position: Sitting)   Pulse 84   Temp 98 øF (36.7 øC) (Oral)   Resp 17   Ht 162.6 cm (64\")   Wt 121 kg (267 lb 3.2 oz)   LMP 08/14/2023 (Exact Date)   SpO2 99%   BMI 45.86 kg/mý         Physical Exam  Vitals and nursing note reviewed.   Constitutional:       General: She is not in acute distress.     Appearance: Normal appearance. She is not ill-appearing, toxic-appearing or diaphoretic.   HENT:      Head: Normocephalic and atraumatic.      Nose: Nose normal.      Mouth/Throat:      Mouth: Mucous membranes are moist.   Eyes:      Extraocular Movements: Extraocular movements intact.      Pupils: Pupils are equal, round, and reactive to light.   Cardiovascular:      Rate and Rhythm: Normal rate and regular rhythm.      Heart sounds: Normal heart sounds.   Pulmonary:      Effort: Pulmonary effort is normal. No respiratory distress.      Breath sounds: Normal breath sounds. No wheezing.   Chest:      Chest wall: No tenderness.   Abdominal:      General: Abdomen is flat. Bowel sounds are normal. There is no distension.      Palpations: Abdomen is soft. There is no mass.      Tenderness: There is no abdominal tenderness. There is right CVA tenderness. There is no left CVA tenderness or guarding.   Musculoskeletal:         General: Normal range of motion.      Cervical back: Normal range of motion and neck supple.   Skin:     General: Skin is warm and dry.      Coloration: Skin is not jaundiced or pale.   Neurological:      General: No focal deficit present.      Mental Status: She is alert and oriented to person, place, and time.   Psychiatric:         Mood and Affect: Mood normal.         Behavior: Behavior normal.      .              Procedures:  Procedures      Medical Decision Making:      Comorbidities that affect care:    Asthma, Hypertension, Obesity    External Notes reviewed:    Previous Clinic Note: Patient seen at PCPs office on 8/10/2023 for urinary " frequency      The following orders were placed and all results were independently analyzed by me:  Orders Placed This Encounter   Procedures    CT Abdomen Pelvis With Contrast    Arbon Draw    Comprehensive Metabolic Panel    Lipase    Urinalysis With Microscopic If Indicated (No Culture) - Urine, Clean Catch    hCG, Quantitative, Pregnancy    CBC Auto Differential    Urinalysis, Microscopic Only - Urine, Clean Catch    Urinalysis With Microscopic If Indicated (No Culture) - Straight Cath    Urinalysis, Microscopic Only - Urine, Clean Catch    NPO Diet NPO Type: Strict NPO    Undress & Gown    Insert Peripheral IV    CBC & Differential    Green Top (Gel)    Lavender Top    Gold Top - SST    Light Blue Top       Medications Given in the Emergency Department:  Medications   sodium chloride 0.9 % flush 10 mL (has no administration in time range)   ketorolac (TORADOL) injection 30 mg (30 mg Intravenous Given 8/15/23 2252)   ondansetron (ZOFRAN) injection 4 mg (4 mg Intravenous Given 8/15/23 2252)   iopamidol (ISOVUE-370) 76 % injection 100 mL (96 mL Intravenous Given 8/15/23 2325)        ED Course:    The patient was initially evaluated in the triage area where orders were placed. The patient was later dispositioned by Yusra Shabbir, PA.      The patient was advised to stay for completion of workup which includes but is not limited to communication of labs and radiological results, reassessment and plan. The patient was advised that leaving prior to disposition by a provider could result in critical findings that are not communicated to the patient.     ED Course as of 08/16/23 0039   Tue Aug 15, 2023   2346 CT Abdomen Pelvis With Contrast [YS]      ED Course User Index  [YS] Shabbir, Yusra, PA       Labs:    Lab Results (last 24 hours)       Procedure Component Value Units Date/Time    CBC & Differential [[id number]]  (Abnormal) Collected: 08/15/23 2134    Specimen: Blood Updated: 08/15/23 2142    Narrative:      The  following orders were created for panel order CBC & Differential.  Procedure                               Abnormality         Status                     ---------                               -----------         ------                     CBC Auto Differential[[id number]]        Abnormal            Final result                 Please view results for these tests on the individual orders.    Comprehensive Metabolic Panel [[id number]]  (Abnormal) Collected: 08/15/23 2134    Specimen: Blood Updated: 08/15/23 2204     Glucose 163 mg/dL      BUN 9 mg/dL      Creatinine 0.95 mg/dL      Sodium 141 mmol/L      Potassium 3.8 mmol/L      Chloride 105 mmol/L      CO2 23.2 mmol/L      Calcium 9.1 mg/dL      Total Protein 6.7 g/dL      Albumin 3.9 g/dL      ALT (SGPT) 15 U/L      AST (SGOT) 12 U/L      Alkaline Phosphatase 120 U/L      Total Bilirubin 0.2 mg/dL      Globulin 2.8 gm/dL      A/G Ratio 1.4 g/dL      BUN/Creatinine Ratio 9.5     Anion Gap 12.8 mmol/L      eGFR 84.9 mL/min/1.73     Narrative:      GFR Normal >60  Chronic Kidney Disease <60  Kidney Failure <15      Lipase [[id number]]  (Normal) Collected: 08/15/23 2134    Specimen: Blood Updated: 08/15/23 2204     Lipase 45 U/L     hCG, Quantitative, Pregnancy [[id number]] Collected: 08/15/23 2134    Specimen: Blood Updated: 08/15/23 2206     HCG Quantitative <0.50 mIU/mL     Narrative:      HCG Ranges by Gestational Age    Females - non-pregnant premenopausal   </= 1mIU/mL HCG  Females - postmenopausal               </= 7mIU/mL HCG    3 Weeks       5.4   -      72 mIU/mL  4 Weeks      10.2   -     708 mIU/mL  5 Weeks       217   -   8,245 mIU/mL  6 Weeks       152   -  32,177 mIU/mL  7 Weeks     4,059   - 153,767 mIU/mL  8 Weeks    31,366   - 149,094 mIU/mL  9 Weeks    59,109   - 135,901 mIU/mL  10 Weeks   44,186   - 170,409 mIU/mL  12 Weeks   27,107   - 201,615 mIU/mL  14 Weeks   24,302   -  93,646 mIU/mL  15 Weeks   12,540   -  69,747 mIU/mL  16 Weeks    8,904   -   55,332 mIU/mL  17 Weeks    8,240   -  51,793 mIU/mL  18 Weeks    9,649   -  55,271 mIU/mL      CBC Auto Differential [[id number]]  (Abnormal) Collected: 08/15/23 2134    Specimen: Blood Updated: 08/15/23 2142     WBC 8.62 10*3/mm3      RBC 4.80 10*6/mm3      Hemoglobin 13.8 g/dL      Hematocrit 41.9 %      MCV 87.3 fL      MCH 28.8 pg      MCHC 32.9 g/dL      RDW 12.1 %      RDW-SD 39.2 fl      MPV 9.8 fL      Platelets 260 10*3/mm3      Neutrophil % 59.4 %      Lymphocyte % 31.2 %      Monocyte % 5.7 %      Eosinophil % 2.6 %      Basophil % 0.8 %      Immature Grans % 0.3 %      Neutrophils, Absolute 5.12 10*3/mm3      Lymphocytes, Absolute 2.69 10*3/mm3      Monocytes, Absolute 0.49 10*3/mm3      Eosinophils, Absolute 0.22 10*3/mm3      Basophils, Absolute 0.07 10*3/mm3      Immature Grans, Absolute 0.03 10*3/mm3      nRBC 0.0 /100 WBC     Urinalysis With Microscopic If Indicated (No Culture) - Urine, Clean Catch [[id number]]  (Abnormal) Collected: 08/15/23 2151    Specimen: Urine, Clean Catch Updated: 08/15/23 2201     Color, UA Red     Appearance, UA Turbid     pH, UA --     Comment: Unable to report chemistries due to color interference.         Specific Gravity, UA >=1.030     Glucose, UA --     Comment: Unable to report chemistries due to color interference.         Ketones, UA --     Comment: Unable to report chemistries due to color interference.         Bilirubin, UA --     Comment: Unable to report chemistries due to color interference.         Blood, UA --     Comment: Unable to report chemistries due to color interference.         Protein, UA --     Comment: Unable to report chemistries due to color interference.         Leuk Esterase, UA --     Comment: Unable to report chemistries due to color interference.         Nitrite, UA --     Comment: Unable to report chemistries due to color interference.         Urobilinogen, UA --     Comment: Unable to report chemistries due to color interference.         Urinalysis, Microscopic Only - Urine, Clean Catch [[id number]]  (Abnormal) Collected: 08/15/23 2151    Specimen: Urine, Clean Catch Updated: 08/15/23 2219     RBC, UA Too Numerous to Count /HPF      WBC, UA 6-12 /HPF      Bacteria, UA None Seen /HPF      Squamous Epithelial Cells, UA 7-12 /HPF      Hyaline Casts, UA None Seen /LPF      Methodology Manual Light Microscopy    Urinalysis With Microscopic If Indicated (No Culture) - Straight Cath [[id number]]  (Abnormal) Collected: 08/16/23 0005    Specimen: Urine from Straight Cath Updated: 08/16/23 0016     Color, UA Yellow     Appearance, UA Clear     pH, UA 7.0     Specific Gravity, UA >1.030     Glucose, UA Negative     Ketones, UA Negative     Bilirubin, UA Negative     Blood, UA Moderate (2+)     Protein, UA Negative     Leuk Esterase, UA Negative     Nitrite, UA Negative     Urobilinogen, UA 0.2 E.U./dL    Urinalysis, Microscopic Only - Straight Cath [[id number]]  (Abnormal) Collected: 08/16/23 0005    Specimen: Urine from Straight Cath Updated: 08/16/23 0016     RBC, UA 31-50 /HPF      WBC, UA 3-5 /HPF      Bacteria, UA None Seen /HPF      Squamous Epithelial Cells, UA 0-2 /HPF      Hyaline Casts, UA 3-6 /LPF      Methodology Automated Microscopy             Imaging:    CT Abdomen Pelvis With Contrast    Result Date: 8/15/2023  PROCEDURE: CT ABDOMEN PELVIS W CONTRAST  COMPARISON: [facility], CT, CT ABDOMEN PELVIS W CONTRAST, 3/05/2023, 10:45.  INDICATIONS: RIGHT LOWER QUADRANT PAIN X TODAY. HISTORY OF KIDNEY STONES  TECHNIQUE: After obtaining the patient's consent, CT images were created with non-ionic intravenous contrast material.   PROTOCOL:   Standard imaging protocol performed    RADIATION:   DLP: 1286 mGy*cm   Automated exposure control was utilized to minimize radiation dose. CONTRAST: 96 cc Isovue 370 I.V. LABS:   eGFR: 84.9 ml/min/1.73m2  FINDINGS:  The lung bases are clear.  The liver, gallbladder, adrenal glands, left kidney, spleen, and  pancreas are unremarkable.  There is a 4 mm obstructing stone within the distal right ureter, with mild right hydronephrosis.  The stomach appears normal.  The small bowel appears normal in caliber and configuration.  The colon appears normal.  The appendix appears normal.  There is no ascites or loculated collection.  No abnormally enlarged lymph nodes are identified.  The rectum, uterus and adnexa, and urinary bladder are unremarkable.  No aggressive osseous lesions are identified.       4 mm obstructing stone within distal right ureter, with mild right hydronephrosis.     MONICA SANTOS MD       Electronically Signed and Approved By: MONICA SANTOS MD on 8/15/2023 at 23:43                Differential Diagnosis and Discussion:      Dysuria: Differential diagnosis includes but is not limited to urethritis, cystitis, pyelonephritis, ureteral calculi, neoplasm, chemical irritant, urethral stricture, and trauma    All labs were reviewed and interpreted by me.  CT scan radiology impression was interpreted by me.    MDM  Number of Diagnoses or Management Options  Ureterolithiasis  Diagnosis management comments: Patient's urinalysis remarkable for hematuria and increased specific gravity.  Patient CBC and CMP unremarkable.  Patient's lipase and pregnancy negative.  Patient's vital signs within normal limits for us, specifically no fever.  Patient CT of abdomen pelvis remarkable for a 4 mm obstructing stone in the right ureter with mild hydronephrosis.  Patient was given Toradol and Zofran emergency department for symptomatic relief.  Considered, consulting urology however due to patient being afebrile, no white count, nitrite negative urine I opted out of having patient seen by urology outpatient.  At the time of discharge patient's vital signs within normal meds.  Patient was discharged home with Norco, Zofran, Flomax.  Patient was educated about how there may be similar, monensin Flomax as Bactrim and she may  experience allergic reaction to her symptoms.  Patient will discontinues to has a reaction to taking Flomax.  Patient states understanding.  Patient agreeable treatment plan.             Patient Care Considerations:    CONSULT: I considered consulting urology, however patient is afebrile in the emergency department with normal vital signs.  Patient's white count within normal limits.  Patient's urine is unremarkable.  Patient was given outpatient referral to urology.      Consultants/Shared Management Plan:    I have discussed the case with Dr. Devan Rodriguez who states that the patient can be safely discharged with close follow up.    Social Determinants of Health:    Patient is independent, reliable, and has access to care.       Disposition and Care Coordination:    Discharged: I considered escalation of care by admitting this patient for observation, however the patient has improved and is suitable and  stable for discharge.    I have explained the patient's condition, diagnoses and treatment plan based on the information available to me at this time. I have answered questions and addressed any concerns. The patient has a good  understanding of the patient's diagnosis, condition, and treatment plan as can be expected at this point. The vital signs have been stable. The patient's condition is stable and appropriate for discharge from the emergency department.      The patient will pursue further outpatient evaluation with the primary care physician or other designated or consulting physician as outlined in the discharge instructions. They are agreeable to this plan of care and follow-up instructions have been explained in detail. The patient has received these instructions in written format and have expressed an understanding of the discharge instructions. The patient is aware that any significant change in condition or worsening of symptoms should prompt an immediate return to this or the closest emergency department  or call to 911.  I have explained discharge medications and the need for follow up with the patient/caretakers. This was also printed in the discharge instructions. Patient was discharged with the following medications and follow up:      Medication List        New Prescriptions      HYDROcodone-acetaminophen 5-325 MG per tablet  Commonly known as: NORCO  Take 1 tablet by mouth Every 6 (Six) Hours As Needed for Moderate Pain.     ondansetron ODT 4 MG disintegrating tablet  Commonly known as: ZOFRAN-ODT  Place 1 tablet on the tongue Every 8 (Eight) Hours As Needed for Vomiting or Nausea for up to 10 days.     tamsulosin 0.4 MG capsule 24 hr capsule  Commonly known as: FLOMAX  Take 1 capsule by mouth Daily.            Stop      fluconazole 150 MG tablet  Commonly known as: Diflucan     nitrofurantoin (macrocrystal-monohydrate) 100 MG capsule  Commonly known as: Macrobid               Where to Get Your Medications        These medications were sent to [facility]/pharmacy #[id number] - [place], [place] - [address] - [phone] [facility] [phone]   [address] [facility] [address]      Hours: 24-hours Phone: [phone]   HYDROcodone-acetaminophen 5-325 MG per tablet  ondansetron ODT 4 MG disintegrating tablet  tamsulosin 0.4 MG capsule 24 hr capsule      Meredith Alarcon APRN  [address] [facility]  [address]  [place] [address]  [phone]    In 3 days      Bailee Edgar MD  [address] [place] [id number]  [phone]    Schedule an appointment as soon as possible for a visit          Final diagnoses:   Ureterolithiasis        ED Disposition       ED Disposition   Discharge    Condition   Stable    Comment   --               This medical record created using voice recognition software.             Shabbir, Yusra, PA  08/16/23 0039

## 2023-08-16 NOTE — TELEPHONE ENCOUNTER
PER SONJA, ADD ON TO FRI. PT IS AGREEABLE TO SCHED. ADVISED THAT HOSPITAL WILL CALL HER DIRECTLY WITH AN ARRIVAL TIME, NOTHING TO EAT/DRINK AFTER MIDNIGHT, WILL NEED A . PT EXPRESSED UNDERSTANDING AND IS AGREEABLE.

## 2023-08-16 NOTE — DISCHARGE INSTRUCTIONS
Your CT of abdomen pelvis shows that you had a 4 mm stone in your right ureter.,  Drink plenty of fluids such as water and beverages rich in electrolytes.  I prescribed you a short course of pain medication, take as prescribed.  Take Zofran as prescribed for nausea and vomiting.  I have also prescribed you Flomax, take as prescribed.  Note any allergic type symptoms whenever taking this medication, discontinue if you have a reaction.  Call to schedule appointment with urology with information provided below.  Return to the emergency department for new or worsening symptoms.

## 2023-08-16 NOTE — TELEPHONE ENCOUNTER
----- Message from Hamida Kessler sent at 8/16/2023 10:09 AM EDT -----  Regarding: APPT  REFERRAL IN [facility]/Located within [facility] ER F/U/OSNJA ON CALL/RECORDS IN [facility]/PLEASE ADVISE ON APPT??

## 2023-08-17 ENCOUNTER — OFFICE VISIT (OUTPATIENT)
Dept: PLASTIC SURGERY | Facility: CLINIC | Age: 28
End: 2023-08-17
Payer: COMMERCIAL

## 2023-08-17 VITALS
HEART RATE: 94 BPM | WEIGHT: 264.4 LBS | SYSTOLIC BLOOD PRESSURE: 110 MMHG | OXYGEN SATURATION: 98 % | HEIGHT: 64 IN | TEMPERATURE: 98.6 F | DIASTOLIC BLOOD PRESSURE: 77 MMHG | BODY MASS INDEX: 45.14 KG/M2

## 2023-08-17 DIAGNOSIS — N62 MACROMASTIA: Primary | ICD-10-CM

## 2023-08-17 PROCEDURE — 99024 POSTOP FOLLOW-UP VISIT: CPT | Performed by: SURGERY

## 2023-08-17 NOTE — PRE-PROCEDURE INSTRUCTIONS
PATIENT INSTRUCTED TO BE:    - NPO AFTER MIDNIGHT EXCEPT CAN HAVE CLEAR LIQUIDS 2 HOURS PRIOR TO SURGERY ARRIVAL TIME     - TO HOLD ALL VITAMINS, SUPPLEMENTS, NSAIDS FOR ONE WEEK PRIOR TO THEIR SURGICAL PROCEDURE    - INSTRUCTED PT TO USE SURGICAL SOAP 1 TIME THE NIGHT PRIOR TO SURGERY OR THE AM OF SURGERY.   USE SOAP FROM NECK TO TOES AVOID THEIR FACE, HAIR, AND PRIVATE PARTS. INSTRUCTED NO LOTIONS, JEWELRY, PIERCINGS, OR DEODORANT DAY OF SURGERY    - IF DIABETIC, CHECK BLOOD GLUCOSE IF LESS THAN 70 CALL PREOP AREA -AM OF SURGERY     - INSTRUCTED TO TAKE THE FOLLOWING MEDICATIONS THE DAY OF SURGERY:        INHALERS PRN, BISOPROLOL, NORCO PRN, ZOFRAN PRN, FLOMAX     BRING INHALER DAY OF SURGERY     PATIENT VERBALIZED UNDERSTANDING

## 2023-08-18 ENCOUNTER — HOSPITAL ENCOUNTER (OUTPATIENT)
Facility: HOSPITAL | Age: 28
Setting detail: HOSPITAL OUTPATIENT SURGERY
Discharge: HOME | End: 2023-08-18
Attending: UROLOGY | Admitting: UROLOGY
Payer: COMMERCIAL

## 2023-08-18 ENCOUNTER — ANESTHESIA (OUTPATIENT)
Dept: PERIOP | Facility: HOSPITAL | Age: 28
End: 2023-08-18
Payer: COMMERCIAL

## 2023-08-18 ENCOUNTER — APPOINTMENT (OUTPATIENT)
Dept: GENERAL RADIOLOGY | Facility: HOSPITAL | Age: 28
End: 2023-08-18
Payer: COMMERCIAL

## 2023-08-18 ENCOUNTER — ANESTHESIA EVENT (OUTPATIENT)
Dept: PERIOP | Facility: HOSPITAL | Age: 28
End: 2023-08-18
Payer: COMMERCIAL

## 2023-08-18 VITALS
WEIGHT: 263.67 LBS | SYSTOLIC BLOOD PRESSURE: 109 MMHG | RESPIRATION RATE: 16 BRPM | HEIGHT: 61 IN | BODY MASS INDEX: 49.78 KG/M2 | TEMPERATURE: 97.5 F | HEART RATE: 72 BPM | OXYGEN SATURATION: 97 % | DIASTOLIC BLOOD PRESSURE: 54 MMHG

## 2023-08-18 DIAGNOSIS — N20.1 RIGHT URETERAL STONE: ICD-10-CM

## 2023-08-18 LAB
B-HCG UR QL: NEGATIVE
GLUCOSE BLDC GLUCOMTR-MCNC: 96 MG/DL (ref 70–99)

## 2023-08-18 PROCEDURE — 25010000002 FENTANYL CITRATE (PF) 50 MCG/ML SOLUTION

## 2023-08-18 PROCEDURE — 82365 CALCULUS SPECTROSCOPY: CPT | Performed by: UROLOGY

## 2023-08-18 PROCEDURE — C1758 CATHETER, URETERAL: HCPCS | Performed by: UROLOGY

## 2023-08-18 PROCEDURE — 52352 CYSTOURETERO W/STONE REMOVE: CPT | Performed by: UROLOGY

## 2023-08-18 PROCEDURE — 25010000002 ONDANSETRON PER 1 MG

## 2023-08-18 PROCEDURE — 25010000002 PROPOFOL 10 MG/ML EMULSION

## 2023-08-18 PROCEDURE — 25010000002 DIPHENHYDRAMINE PER 50 MG

## 2023-08-18 PROCEDURE — 25010000002 SUGAMMADEX 200 MG/2ML SOLUTION

## 2023-08-18 PROCEDURE — 25010000002 MIDAZOLAM PER 1MG: Performed by: ANESTHESIOLOGY

## 2023-08-18 PROCEDURE — C2617 STENT, NON-COR, TEM W/O DEL: HCPCS | Performed by: UROLOGY

## 2023-08-18 PROCEDURE — 74420 UROGRAPHY RTRGR +-KUB: CPT | Performed by: UROLOGY

## 2023-08-18 PROCEDURE — C1769 GUIDE WIRE: HCPCS | Performed by: UROLOGY

## 2023-08-18 PROCEDURE — 82948 REAGENT STRIP/BLOOD GLUCOSE: CPT

## 2023-08-18 PROCEDURE — 25010000002 HYDROMORPHONE 1 MG/ML SOLUTION

## 2023-08-18 PROCEDURE — 76000 FLUOROSCOPY <1 HR PHYS/QHP: CPT

## 2023-08-18 PROCEDURE — S0260 H&P FOR SURGERY: HCPCS | Performed by: UROLOGY

## 2023-08-18 PROCEDURE — 25010000002 CEFAZOLIN PER 500 MG: Performed by: UROLOGY

## 2023-08-18 PROCEDURE — 81025 URINE PREGNANCY TEST: CPT | Performed by: ANESTHESIOLOGY

## 2023-08-18 PROCEDURE — 52332 CYSTOSCOPY AND TREATMENT: CPT | Performed by: UROLOGY

## 2023-08-18 PROCEDURE — 25010000002 METOCLOPRAMIDE PER 10 MG: Performed by: ANESTHESIOLOGY

## 2023-08-18 PROCEDURE — 88300 SURGICAL PATH GROSS: CPT | Performed by: UROLOGY

## 2023-08-18 PROCEDURE — 25510000001 IOPAMIDOL PER 1 ML: Performed by: UROLOGY

## 2023-08-18 PROCEDURE — 25010000002 KETOROLAC TROMETHAMINE PER 15 MG

## 2023-08-18 DEVICE — STNT URETRL CLASSC DBL PIG 6F 28CM: Type: IMPLANTABLE DEVICE | Site: URETER | Status: FUNCTIONAL

## 2023-08-18 RX ORDER — PHENAZOPYRIDINE HYDROCHLORIDE 200 MG/1
200 TABLET, FILM COATED ORAL 3 TIMES DAILY PRN
Qty: 18 TABLET | Refills: 0 | Status: SHIPPED | OUTPATIENT
Start: 2023-08-18

## 2023-08-18 RX ORDER — ONDANSETRON 4 MG/1
4 TABLET, FILM COATED ORAL ONCE AS NEEDED
Status: DISCONTINUED | OUTPATIENT
Start: 2023-08-18 | End: 2023-08-18 | Stop reason: HOSPADM

## 2023-08-18 RX ORDER — MIDAZOLAM HYDROCHLORIDE 2 MG/2ML
2 INJECTION, SOLUTION INTRAMUSCULAR; INTRAVENOUS ONCE
Status: COMPLETED | OUTPATIENT
Start: 2023-08-18 | End: 2023-08-18

## 2023-08-18 RX ORDER — SCOLOPAMINE TRANSDERMAL SYSTEM 1 MG/1
1 PATCH, EXTENDED RELEASE TRANSDERMAL ONCE
Status: DISCONTINUED | OUTPATIENT
Start: 2023-08-18 | End: 2023-08-18 | Stop reason: HOSPADM

## 2023-08-18 RX ORDER — METOCLOPRAMIDE HYDROCHLORIDE 5 MG/ML
10 INJECTION INTRAMUSCULAR; INTRAVENOUS ONCE
Status: COMPLETED | OUTPATIENT
Start: 2023-08-18 | End: 2023-08-18

## 2023-08-18 RX ORDER — OXYCODONE HYDROCHLORIDE AND ACETAMINOPHEN 5; 325 MG/1; MG/1
1-2 TABLET ORAL EVERY 6 HOURS PRN
Qty: 14 TABLET | Refills: 0 | Status: SHIPPED | OUTPATIENT
Start: 2023-08-18

## 2023-08-18 RX ORDER — ONDANSETRON 2 MG/ML
4 INJECTION INTRAMUSCULAR; INTRAVENOUS ONCE AS NEEDED
Status: DISCONTINUED | OUTPATIENT
Start: 2023-08-18 | End: 2023-08-18 | Stop reason: HOSPADM

## 2023-08-18 RX ORDER — PROMETHAZINE HYDROCHLORIDE 12.5 MG/1
12.5 TABLET ORAL ONCE AS NEEDED
Status: DISCONTINUED | OUTPATIENT
Start: 2023-08-18 | End: 2023-08-18 | Stop reason: HOSPADM

## 2023-08-18 RX ORDER — OXYBUTYNIN CHLORIDE 5 MG/1
5 TABLET ORAL 3 TIMES DAILY PRN
Qty: 12 TABLET | Refills: 0 | Status: SHIPPED | OUTPATIENT
Start: 2023-08-18

## 2023-08-18 RX ORDER — PROMETHAZINE HYDROCHLORIDE 25 MG/1
25 SUPPOSITORY RECTAL ONCE AS NEEDED
Status: DISCONTINUED | OUTPATIENT
Start: 2023-08-18 | End: 2023-08-18 | Stop reason: HOSPADM

## 2023-08-18 RX ORDER — IBUPROFEN 600 MG/1
600 TABLET ORAL EVERY 6 HOURS PRN
Status: DISCONTINUED | OUTPATIENT
Start: 2023-08-18 | End: 2023-08-18 | Stop reason: HOSPADM

## 2023-08-18 RX ORDER — LIDOCAINE HYDROCHLORIDE 20 MG/ML
INJECTION, SOLUTION EPIDURAL; INFILTRATION; INTRACAUDAL; PERINEURAL AS NEEDED
Status: DISCONTINUED | OUTPATIENT
Start: 2023-08-18 | End: 2023-08-18 | Stop reason: SURG

## 2023-08-18 RX ORDER — ACETAMINOPHEN 500 MG
1000 TABLET ORAL ONCE
Status: COMPLETED | OUTPATIENT
Start: 2023-08-18 | End: 2023-08-18

## 2023-08-18 RX ORDER — DIPHENHYDRAMINE HYDROCHLORIDE 50 MG/ML
INJECTION INTRAMUSCULAR; INTRAVENOUS AS NEEDED
Status: DISCONTINUED | OUTPATIENT
Start: 2023-08-18 | End: 2023-08-18 | Stop reason: SURG

## 2023-08-18 RX ORDER — FAMOTIDINE 10 MG/ML
20 INJECTION, SOLUTION INTRAVENOUS
Status: COMPLETED | OUTPATIENT
Start: 2023-08-18 | End: 2023-08-18

## 2023-08-18 RX ORDER — DOXAZOSIN 2 MG/1
2 TABLET ORAL DAILY
Qty: 10 TABLET | Refills: 0 | Status: SHIPPED | OUTPATIENT
Start: 2023-08-18 | End: 2023-08-28

## 2023-08-18 RX ORDER — MEPERIDINE HYDROCHLORIDE 25 MG/ML
12.5 INJECTION INTRAMUSCULAR; INTRAVENOUS; SUBCUTANEOUS
Status: DISCONTINUED | OUTPATIENT
Start: 2023-08-18 | End: 2023-08-18 | Stop reason: HOSPADM

## 2023-08-18 RX ORDER — MAGNESIUM HYDROXIDE 1200 MG/15ML
LIQUID ORAL AS NEEDED
Status: DISCONTINUED | OUTPATIENT
Start: 2023-08-18 | End: 2023-08-18 | Stop reason: HOSPADM

## 2023-08-18 RX ORDER — KETOROLAC TROMETHAMINE 30 MG/ML
INJECTION, SOLUTION INTRAMUSCULAR; INTRAVENOUS AS NEEDED
Status: DISCONTINUED | OUTPATIENT
Start: 2023-08-18 | End: 2023-08-18 | Stop reason: SURG

## 2023-08-18 RX ORDER — ONDANSETRON 4 MG/1
4 TABLET, ORALLY DISINTEGRATING ORAL EVERY 8 HOURS PRN
Qty: 8 TABLET | Refills: 0 | Status: SHIPPED | OUTPATIENT
Start: 2023-08-18 | End: 2023-08-28

## 2023-08-18 RX ORDER — ONDANSETRON 2 MG/ML
INJECTION INTRAMUSCULAR; INTRAVENOUS AS NEEDED
Status: DISCONTINUED | OUTPATIENT
Start: 2023-08-18 | End: 2023-08-18 | Stop reason: SURG

## 2023-08-18 RX ORDER — KETOROLAC TROMETHAMINE 10 MG/1
10 TABLET, FILM COATED ORAL EVERY 6 HOURS PRN
Qty: 8 TABLET | Refills: 0 | Status: SHIPPED | OUTPATIENT
Start: 2023-08-18

## 2023-08-18 RX ORDER — ACETAMINOPHEN 325 MG/1
650 TABLET ORAL ONCE
Status: DISCONTINUED | OUTPATIENT
Start: 2023-08-18 | End: 2023-08-18

## 2023-08-18 RX ORDER — OXYCODONE HCL 5 MG/5 ML
5 SOLUTION, ORAL ORAL EVERY 4 HOURS PRN
Status: DISCONTINUED | OUTPATIENT
Start: 2023-08-18 | End: 2023-08-18 | Stop reason: HOSPADM

## 2023-08-18 RX ORDER — FENTANYL CITRATE 50 UG/ML
INJECTION, SOLUTION INTRAMUSCULAR; INTRAVENOUS AS NEEDED
Status: DISCONTINUED | OUTPATIENT
Start: 2023-08-18 | End: 2023-08-18 | Stop reason: SURG

## 2023-08-18 RX ORDER — PROMETHAZINE HYDROCHLORIDE 12.5 MG/1
25 TABLET ORAL ONCE AS NEEDED
Status: DISCONTINUED | OUTPATIENT
Start: 2023-08-18 | End: 2023-08-18 | Stop reason: HOSPADM

## 2023-08-18 RX ORDER — PROPOFOL 10 MG/ML
VIAL (ML) INTRAVENOUS AS NEEDED
Status: DISCONTINUED | OUTPATIENT
Start: 2023-08-18 | End: 2023-08-18 | Stop reason: SURG

## 2023-08-18 RX ORDER — SODIUM CHLORIDE, SODIUM LACTATE, POTASSIUM CHLORIDE, CALCIUM CHLORIDE 600; 310; 30; 20 MG/100ML; MG/100ML; MG/100ML; MG/100ML
9 INJECTION, SOLUTION INTRAVENOUS CONTINUOUS PRN
Status: DISCONTINUED | OUTPATIENT
Start: 2023-08-18 | End: 2023-08-18 | Stop reason: HOSPADM

## 2023-08-18 RX ORDER — CEFAZOLIN SODIUM IN 0.9 % NACL 3 G/100 ML
3000 INTRAVENOUS SOLUTION, PIGGYBACK (ML) INTRAVENOUS ONCE
Status: COMPLETED | OUTPATIENT
Start: 2023-08-18 | End: 2023-08-18

## 2023-08-18 RX ORDER — ROCURONIUM BROMIDE 10 MG/ML
INJECTION, SOLUTION INTRAVENOUS AS NEEDED
Status: DISCONTINUED | OUTPATIENT
Start: 2023-08-18 | End: 2023-08-18 | Stop reason: SURG

## 2023-08-18 RX ORDER — SODIUM CHLORIDE 9 MG/ML
40 INJECTION, SOLUTION INTRAVENOUS AS NEEDED
Status: DISCONTINUED | OUTPATIENT
Start: 2023-08-18 | End: 2023-08-18 | Stop reason: HOSPADM

## 2023-08-18 RX ADMIN — MIDAZOLAM HYDROCHLORIDE 2 MG: 1 INJECTION, SOLUTION INTRAMUSCULAR; INTRAVENOUS at 10:09

## 2023-08-18 RX ADMIN — PROPOFOL 200 MG: 10 INJECTION, EMULSION INTRAVENOUS at 10:46

## 2023-08-18 RX ADMIN — LIDOCAINE HYDROCHLORIDE 100 MG: 20 INJECTION, SOLUTION EPIDURAL; INFILTRATION; INTRACAUDAL; PERINEURAL at 10:46

## 2023-08-18 RX ADMIN — SODIUM CHLORIDE, POTASSIUM CHLORIDE, SODIUM LACTATE AND CALCIUM CHLORIDE 9 ML/HR: 600; 310; 30; 20 INJECTION, SOLUTION INTRAVENOUS at 09:19

## 2023-08-18 RX ADMIN — ACETAMINOPHEN 1000 MG: 500 TABLET ORAL at 09:26

## 2023-08-18 RX ADMIN — ONDANSETRON 4 MG: 2 INJECTION INTRAMUSCULAR; INTRAVENOUS at 11:29

## 2023-08-18 RX ADMIN — FENTANYL CITRATE 50 MCG: 50 INJECTION, SOLUTION INTRAMUSCULAR; INTRAVENOUS at 10:46

## 2023-08-18 RX ADMIN — DIPHENHYDRAMINE HYDROCHLORIDE 12.5 MG: 50 INJECTION INTRAMUSCULAR; INTRAVENOUS at 11:02

## 2023-08-18 RX ADMIN — FENTANYL CITRATE 50 MCG: 50 INJECTION, SOLUTION INTRAMUSCULAR; INTRAVENOUS at 10:52

## 2023-08-18 RX ADMIN — ROCURONIUM BROMIDE 50 MG: 50 INJECTION INTRAVENOUS at 10:46

## 2023-08-18 RX ADMIN — METOCLOPRAMIDE HYDROCHLORIDE 10 MG: 5 INJECTION INTRAMUSCULAR; INTRAVENOUS at 10:09

## 2023-08-18 RX ADMIN — SCOPOLAMINE 1 PATCH: 1.5 PATCH, EXTENDED RELEASE TRANSDERMAL at 09:27

## 2023-08-18 RX ADMIN — KETOROLAC TROMETHAMINE 30 MG: 30 INJECTION, SOLUTION INTRAMUSCULAR; INTRAVENOUS at 11:29

## 2023-08-18 RX ADMIN — Medication 3000 MG: at 10:47

## 2023-08-18 RX ADMIN — SUGAMMADEX 200 MG: 100 INJECTION, SOLUTION INTRAVENOUS at 11:29

## 2023-08-18 RX ADMIN — HYDROMORPHONE HYDROCHLORIDE 0.5 MG: 1 INJECTION, SOLUTION INTRAMUSCULAR; INTRAVENOUS; SUBCUTANEOUS at 12:01

## 2023-08-18 RX ADMIN — FAMOTIDINE 20 MG: 10 INJECTION INTRAVENOUS at 10:09

## 2023-08-18 NOTE — ANESTHESIA PREPROCEDURE EVALUATION
Anesthesia Evaluation     Patient summary reviewed and Nursing notes reviewed   history of anesthetic complications:  PONV               Airway   Mallampati: I  TM distance: >3 FB  Neck ROM: full  No difficulty expected  Dental      Pulmonary - normal exam    breath sounds clear to auscultation  (+) asthma,sleep apnea  Cardiovascular - normal exam    Rhythm: regular  Rate: normal    (+) hypertension      Neuro/Psych  (+) headaches, psychiatric history  GI/Hepatic/Renal/Endo    (+) morbid obesity, GERD, renal disease, diabetes mellitus    Musculoskeletal (-) negative ROS    Abdominal    Substance History - negative use     OB/GYN negative ob/gyn ROS         Other                      Anesthesia Plan    ASA 4     general     intravenous induction     Anesthetic plan, risks, benefits, and alternatives have been provided, discussed and informed consent has been obtained with: patient.    CODE STATUS:

## 2023-08-18 NOTE — DISCHARGE INSTRUCTIONS
DISCHARGE INSTRUCTIONS  Extracorporeal Shock Wave Lithotripsy (ESWL)/ Ureteroscopy Lasertripsy      For your surgery you had:  General anesthesia (you may have a sore throat for the first 24 hours)  You received a medicated path for nausea prevention today (behind your ear). It is recommended that you remove it 24-48 hours post-operatively. It must be removed within 72 hours.   You have received an anesthesia medication today that can cause hormonal forms of birth control to be ineffective. You should use a different form of birth control (to prevent pregnancy) for 7 days.   You may experience dizziness, drowsiness, or lightheadedness for several hours following surgery.  Do not stay alone today or tonight.  Limit your activity for 24 hours.  You should not drive or operate machinery, drink alcohol, or sign legally binding documents for 24 hours or while you are taking pain medication.  Resume your diet slowly.  Follow any special dietary instructions you may have been given by your doctor.     NOTIFY YOUR DOCTOR IF YOU EXPERIENCE ANY OF THE FOLLOWING:  Temperature greater than 101 degrees Fahrenheit  Shaking Chills  Redness or excessive drainage from incision  Nausea, vomiting and/or pain that is not controlled by prescribed medications  Increase in bleeding or bleeding that is excessive  Unable to urinate in 6 hours after surgery  If unable to reach your doctor, please go to the closest Emergency Room  Strain urine if instructed by physician.  Collect any fragments and take with you on your scheduled appointment. You may pass stone pieces or small blood clots.  Blood in your urine is normal.  It could be light pink to cherry color.  Drink 6-8 glasses of fluid each day to assist with passing of stone fragments.  Back pain is common.  It may feel like a dull ache or back spasm.  Urine will be bloody for several days.  Slight redness or bruising may be noticed on treated side.  If you have difficulty urinating, try  sitting in a bathtub of warm water.    If you have a stent, it must be managed by your urologist.  Do NOT forget.      SPECIAL INSTRUCTIONS:  See Dr. Edgar's instructions on After Visit Summary.    Last dose of pain medication was given at:   Tylenol (1000mg) last at 9:26am. Do not exceed 4000mg of tylenol in a 24 hour period.  Toradol last at 11:30am. May take ibuprofen next at 5:30pm if needed.  Oxycodone last at 12pm. May take percocet next at 4pm if needed.

## 2023-08-18 NOTE — OP NOTE
Preoperative diagnosis  Right ureteral stone    Postoperative diagnosis  Right ureteral stone    Procedure performed  Cystoscopy, right retrograde pyelogram, ureteroscopy, basket stone extraction, stent insertion    Attending surgeon  Bailee Edgar MD     Anesthesia  General    EBL  0 mL    Complications  None    Specimen  Right ureteral stone    Findings  Cystoscopy without abnormality, orthotopic ureters; right retrograde pyelogram with mild hydronephrosis; right ureteral stone retrieved with basket; no other stones   6 x 28 stent no string    Indications  28 y.o. female agreed to undergo the above named procedure after discussion of the alternatives, risks and benefits.   Informed consent was obtained.      Procedure  After informed consent was obtained.  The patient was taken back to the operating suite where general anesthesia was administered.  Once patient was adequately anesthetized she was placed in the dorsolithotomy position.  Her genitals were prepped and draped in the normal sterile fashion.  A rigid cystoscope was inserted gently into the urethral meatus and passed atraumatically into the bladder.  Pan cystoscopy was performed and a 360 degree manner.  No abnormalities were noted.  There were no diverticula, trabeculations, lesions, or tumors.  Patient had bilateral orthotopic ureters.  Focus for the remainder of the procedure was placed on the right side.  A sensor wire was used to intubate the right ureteral orifice which passed up into the renal pelvis.  Next, semirigid ureteroscope was used to navigate the distal ureter.  The distal ureteral stone was encountered as expected in the distal ureter, it was amenable to basket extraction.  Basket was inserted through the ureteroscope and the stone was retrieved entirely.  Passed off for chemical analysis.  Further ureteroscopy up to the level of the renal pelvis did not reveal any ureteral abnormalities further fragments or calculi.    Retrograde  pyelogram indicated mild proximal hydronephrosis.  Stent placement was then performed over the wire under direct vision.  Various sizes of stent were tried but had to be retrieved because they were too short.  Sizes including 4.5 x 22 as well as 4.5 x 28, finally, a 6 x 28 stent was placed over the wire.  The wire was backloaded through the cystoscope and under direct vision the stent was placed.  Upon retrieval of the wire there was proximal coil within the UPJ and visible distal coil within the bladder.  Patient's bladder was then emptied and the procedure deemed terminated.  She was awoken from general anesthesia and transferred to the PACU in stable condition.      Signed:  Bailee Edgar MD  08/18/23  11:39 EDT

## 2023-08-18 NOTE — ANESTHESIA POSTPROCEDURE EVALUATION
Patient: Kinsey Suresh    Procedure Summary       Date: 08/18/23 Room / Location: [facility] OR 08 / [facility] MAIN OR    Anesthesia Start: 1037 Anesthesia Stop: 1144    Procedure: CYSTOSCOPY URETEROSCOPY RETROGRADE PYELOGRAM HOLMIUM LASER STENT INSERTION, right (Right) Diagnosis:       Right ureteral stone      (Right ureteral stone [N20.1])    Surgeons: Bailee Edgar MD Provider: Tre Albarran MD    Anesthesia Type: general ASA Status: 4            Anesthesia Type: general    Vitals  Vitals Value Taken Time   /66 08/18/23 1212   Temp 36.7 øC (98 øF) 08/18/23 1145   Pulse 87 08/18/23 1213   Resp 20 08/18/23 1200   SpO2 95 % 08/18/23 1213   Vitals shown include unvalidated device data.        Post Anesthesia Care and Evaluation    Patient location during evaluation: bedside  Patient participation: complete - patient participated  Level of consciousness: awake  Pain management: adequate    Airway patency: patent  PONV Status: none  Cardiovascular status: acceptable and stable  Respiratory status: acceptable  Hydration status: acceptable    Comments: An Anesthesiologist personally participated in the most demanding procedures (including induction and emergence if applicable) in the anesthesia plan, monitored the course of anesthesia administration at frequent intervals and remained physically present and available for immediate diagnosis and treatment of emergencies.

## 2023-08-18 NOTE — H&P
[facility]   Urology Preop H&P Note    Patient Name: Kinsey Suresh  : 1995  MRN: [id number]  Primary Care Physician:  Meredith Alarcon APRN  Referring Physician: MICHEAL Chun  Date of admission: 2023    Subjective   Subjective     Reason for Consult/ Chief Complaint: Right ureteral stone [N20.1]    HPI:  Kinsey Suresh is a 28 y.o. female history ofRight ureteral stone [N20.1] who presents for further management OR.  Presents for planned Procedure(s):  CYSTOSCOPY URETEROSCOPY RETROGRADE PYELOGRAM HOLMIUM LASER STENT INSERTION, right;  .  Procedure to be preformed on the right.  Risk, benefits, and alternatives discussed with patient.All questions were addressed after providing time for discussion.  Patient denies significant changes since last visit.  No new complaints today.    History of nephrolithiasis requiring intervention.  Previously on left by Dr. Valenzuela.    CT abdomen pelvis 8/15/2023:4 mm obstructing stone within the distal right ureter, with mild right hydronephrosis.  No other stones bilaterally.    Review of Systems   All systems were reviewed and negative except for the above  Personal History     Past Medical History:   Diagnosis Date    Anxiety     Asthma     PRN INHALERS    Borderline diabetic     NO MEDICATIONS AS OF YET TRYING CHANGE OF DIET- DOESN'T CHECK BG AT HOME    Clostridium difficile diarrhea 2022    DENIES ANY CURRENT ISSUES    Depression     GERD (gastroesophageal reflux disease)     HL (hearing loss)     Hypertension     Irritable bowel syndrome     Kidney stones     Migraine without aura     Obesity     Ovarian cyst     right ovary     PONV (postoperative nausea and vomiting)     PVC (premature ventricular contraction)     FOLLOWED BY DR FIGUEROA HAS SCHEDULED ECHO ON 7/3/23 . REPORTS ISSUES WITH UPPER LEFT CHEST PAIN BUT STATES IS SORE WITH PALPATION ALSO REPORTS SOA WITH EXERTION. ACTIVE WORKS FULL TIME    Right ventricular cardiac  abnormality     DILATATION PER DR FIGUEROA NOTE - DENIED CP/SOB    Seasonal allergies     Sleep apnea     DOES NOT USE CPAP    Urinary tract infection     NO CURRENT ISSUES       Past Surgical History:   Procedure Laterality Date    BILATERAL BREAST REDUCTION Bilateral 7/11/2023    Procedure: BREAST REDUCTION BILATERAL EXCISION OF LEFT UPPER CHEST MOLE;  Surgeon: Mariah Lieberman MD;  [place] [facility] [place] [facility] [place]  Service: Plastics;  Laterality: Bilateral;    COLONOSCOPY  2020    COLONOSCOPY N/A 7/11/2022    Procedure: COLONOSCOPY BIOPSY;  Surgeon: David Yip MD;  Location: [facility] ENDOSCOPY;  Service: Gastroenterology;  Laterality: N/A;  NORMAL COLON    ENDOSCOPY      EXCISION LESION Left 7/11/2023    Procedure: BREAST REDUCTION BILATERAL EXCISION OF LEFT UPPER CHEST MOLE;  Surgeon: Mariah Lieberman MD;  [place] [facility] [place] [facility] [place];  Service: Plastics;  Laterality: Left;    KIDNEY STONE SURGERY  11/2021    URETEROSCOPY LASER LITHOTRIPSY WITH STENT INSERTION Left 11/13/2021    Procedure: URETEROSCOPY LASER LITHOTRIPSY WITH STENT INSERTION STONE BASKET EXTRACTION;  Surgeon: Colt Sanders MD;  Location: [facility] MAIN OR;  Service: Urology;  Laterality: Left;       Family History: family history includes Anxiety disorder in her maternal grandmother; Asthma in her sister; Breast cancer (age of onset: 60) in an other family member; Cancer in her maternal aunt; Colon cancer in her maternal grandfather and paternal grandfather; Depression in her sister; Diabetes in her maternal grandfather; Early death in her mother; Heart disease in her maternal grandfather and maternal great-grandfather; Heart failure in her maternal grandfather and mother; Hyperlipidemia in her maternal grandfather; Other in her sister; Skin cancer in her maternal grandmother; Vision loss in her sister. Otherwise pertinent FHx was reviewed and not pertinent to current issue.    Social History:  reports that she has never smoked.  She has been exposed to tobacco smoke. She has never used smokeless tobacco. She reports that she does not drink alcohol and does not use drugs.    Home Medications:  HYDROcodone-acetaminophen, albuterol sulfate HFA, bisoprolol, fluticasone, glucose blood, glucose monitor, lisinopril, mupirocin, ondansetron ODT, and tamsulosin    Allergies:  Allergies   Allergen Reactions    Ciprofloxacin Rash    Doxycycline Hyclate Rash    Doxycycline Monohydrate Rash    Latex Rash    Sulfa Antibiotics Rash    Sulfamethoxazole-Trimethoprim Hives and Rash    Trimethoprim Rash       Objective    Objective     Vitals:   Temp:  [99.8 øF (37.7 øC)] 99.8 øF (37.7 øC)  Heart Rate:  [113] 113  Resp:  [18] 18  BP: (134)/(81) 134/81    Physical Exam:   Constitutional: Awake, alert   Respiratory: Clear, nonlabored respirations    Cardiovascular: Regular rate, no chest retractions   gastrointestinal: Appears soft, nontender     Results:    Assessment & Plan   Assessment / Plan     Brief Patient Summary:  Kinsey Suresh is a 28 y.o. female right ureteral stone; failed trial of passage  Active Hospital Problems:  Active Hospital Problems    Diagnosis     **Right ureteral stone        Plan:   Proceed to the OR for planned procedure, Procedure(s):  CYSTOSCOPY URETEROSCOPY RETROGRADE PYELOGRAM HOLMIUM LASER STENT INSERTION, right,  ,   Risks, benefits and alternatives were discussed with patient including bleeding, infection, damage to surrounding structures, stone recurrence, stricture.  Patient also notes understanding that if unable to reach the level of the stone or if significant purulent discharge noted upon initiation of procedure that stent will be placed in definitive stone management will be deferred until the collecting system is optimized.   Risk, benefits, and alternatives discussed with patient at length she is agreeable to proceed  Laterality identified, right  All questions addressed      Electronically signed by Bailee  MD Herve, 08/18/23, 8:44 AM EDT.

## 2023-08-23 ENCOUNTER — TELEPHONE (OUTPATIENT)
Dept: UROLOGY | Facility: CLINIC | Age: 28
End: 2023-08-23

## 2023-08-23 ENCOUNTER — PROCEDURE VISIT (OUTPATIENT)
Dept: UROLOGY | Facility: CLINIC | Age: 28
End: 2023-08-23
Payer: COMMERCIAL

## 2023-08-23 VITALS — WEIGHT: 263 LBS | HEIGHT: 61 IN | RESPIRATION RATE: 16 BRPM | BODY MASS INDEX: 49.65 KG/M2

## 2023-08-23 DIAGNOSIS — T19.9XXA FOREIGN BODY IN GENITOURINARY TRACT, INITIAL ENCOUNTER: Primary | ICD-10-CM

## 2023-08-23 DIAGNOSIS — N13.30 HYDRONEPHROSIS, UNSPECIFIED HYDRONEPHROSIS TYPE: ICD-10-CM

## 2023-08-23 NOTE — PROGRESS NOTES
Preoperative diagnosis  Foreign body in genitourinary tract; hydronephrosis    Postoperative diagnosis  Same as above    Procedure  Flexible cystourethroscopy with stent removal    Attending surgeon  Bailee Edgar MD     Anesthesia  2% lidocaine jelly intraurethrally    Complications  None    Specimen  None    Estimated blood loss  0cc    Indications  28 y.o. female who is status post right ureteroscopy with stone treatment who presents for stent removal.    Procedure  The patient was appropriately identified and brought to the cytoscopy suite. A timeout was undertaken documenting the correct patient, site, and procedure. The patient was prepped in a normal sterile fashion. A flexible cytoscope was then introduced into the bladder. The stent was identified and grasped with endoscopic graspers. The entire stent was removed and passed off the field. Patient tolerated the procedure well. There were no intraprocedural complications.     Plan  Tolerated procedure well.  Instructions provided.  Patient follow-up in 4 to 6 weeks with renal ultrasound prior or earlier as needed  All question addressed

## 2023-08-28 LAB
CALCIUM OXALATE DIHYDRATE MFR STONE IR: 100 %
COLOR STONE: NORMAL
COMPN STONE: NORMAL
LABORATORY COMMENT REPORT: NORMAL
Lab: NORMAL
Lab: NORMAL
PHOTO: NORMAL
SIZE STONE: NORMAL MM
SPEC SOURCE SUBJ: NORMAL
WT STONE: 14 MG

## 2023-09-07 RX ORDER — LISINOPRIL 10 MG/1
TABLET ORAL
Qty: 30 TABLET | Refills: 0 | Status: SHIPPED | OUTPATIENT
Start: 2023-09-07

## 2023-09-22 RX ORDER — LISINOPRIL 10 MG/1
10 TABLET ORAL DAILY
Qty: 30 TABLET | Refills: 0 | Status: SHIPPED | OUTPATIENT
Start: 2023-09-22

## 2023-10-02 ENCOUNTER — TELEPHONE (OUTPATIENT)
Dept: UROLOGY | Facility: CLINIC | Age: 28
End: 2023-10-02
Payer: COMMERCIAL

## 2023-10-02 NOTE — TELEPHONE ENCOUNTER
CALLED PT TO OFFER R/S OF CX'D APPT 10/4 W/ SONJA.    SPOKE W/ PT WHO REQUESTED SCHEDULING IN DECEMBER DUE TO INSURANCE ISSUES. BOOKED APPT    Future Appointments         Provider Department Center    12/12/2023 9:15 AM Bailee Edgar MD [facility] UROLOGY [facility]    1/17/2024 2:15 PM Mariah Lieberman MD [facility] PLASTIC & RECONSTRUCTIVE SURGERY [facility]

## 2023-10-11 ENCOUNTER — TELEPHONE (OUTPATIENT)
Dept: PLASTIC SURGERY | Facility: CLINIC | Age: 28
End: 2023-10-11
Payer: COMMERCIAL

## 2023-10-11 NOTE — TELEPHONE ENCOUNTER
LVM FOR PATIENT TO CALL AND DISCUSS UPCOMING APPOINTMENT ON 01/17/2023 WITH DR. HASSAN TO SEE OUR APRN GEORGIE AMBROSIO A DIFFERENT DAY THAT WEEK.    PLEASE TRANSFER TO THE OFFICE WHEN PATIENT CALLS BACK.

## 2023-12-01 ENCOUNTER — HOSPITAL ENCOUNTER (EMERGENCY)
Facility: HOSPITAL | Age: 28
Discharge: HOME | End: 2023-12-01
Attending: EMERGENCY MEDICINE
Payer: COMMERCIAL

## 2023-12-01 ENCOUNTER — APPOINTMENT (OUTPATIENT)
Dept: GENERAL RADIOLOGY | Facility: HOSPITAL | Age: 28
End: 2023-12-01
Payer: COMMERCIAL

## 2023-12-01 VITALS
HEIGHT: 64 IN | DIASTOLIC BLOOD PRESSURE: 70 MMHG | HEART RATE: 64 BPM | WEIGHT: 264.4 LBS | SYSTOLIC BLOOD PRESSURE: 112 MMHG | TEMPERATURE: 98.6 F | BODY MASS INDEX: 45.14 KG/M2 | OXYGEN SATURATION: 97 % | RESPIRATION RATE: 20 BRPM

## 2023-12-01 DIAGNOSIS — R07.9 CHEST PAIN, UNSPECIFIED TYPE: Primary | ICD-10-CM

## 2023-12-01 LAB
ALBUMIN SERPL-MCNC: 4.5 G/DL (ref 3.5–5.2)
ALBUMIN/GLOB SERPL: 1.7 G/DL
ALP SERPL-CCNC: 114 U/L (ref 39–117)
ALT SERPL W P-5'-P-CCNC: 19 U/L (ref 1–33)
ANION GAP SERPL CALCULATED.3IONS-SCNC: 11.8 MMOL/L (ref 5–15)
AST SERPL-CCNC: 18 U/L (ref 1–32)
BASOPHILS # BLD AUTO: 0.1 10*3/MM3 (ref 0–0.2)
BASOPHILS NFR BLD AUTO: 0.9 % (ref 0–1.5)
BILIRUB SERPL-MCNC: 0.3 MG/DL (ref 0–1.2)
BUN SERPL-MCNC: 11 MG/DL (ref 6–20)
BUN/CREAT SERPL: 11.5 (ref 7–25)
CALCIUM SPEC-SCNC: 9.9 MG/DL (ref 8.6–10.5)
CHLORIDE SERPL-SCNC: 102 MMOL/L (ref 98–107)
CO2 SERPL-SCNC: 26.2 MMOL/L (ref 22–29)
CREAT SERPL-MCNC: 0.96 MG/DL (ref 0.57–1)
DEPRECATED RDW RBC AUTO: 40.8 FL (ref 37–54)
EGFRCR SERPLBLD CKD-EPI 2021: 83.9 ML/MIN/1.73
EOSINOPHIL # BLD AUTO: 0.11 10*3/MM3 (ref 0–0.4)
EOSINOPHIL NFR BLD AUTO: 1 % (ref 0.3–6.2)
ERYTHROCYTE [DISTWIDTH] IN BLOOD BY AUTOMATED COUNT: 12.7 % (ref 12.3–15.4)
FLUAV SUBTYP SPEC NAA+PROBE: NOT DETECTED
FLUBV RNA ISLT QL NAA+PROBE: NOT DETECTED
GLOBULIN UR ELPH-MCNC: 2.7 GM/DL
GLUCOSE SERPL-MCNC: 115 MG/DL (ref 65–99)
HCT VFR BLD AUTO: 45.2 % (ref 34–46.6)
HGB BLD-MCNC: 14.8 G/DL (ref 12–15.9)
HOLD SPECIMEN: NORMAL
HOLD SPECIMEN: NORMAL
IMM GRANULOCYTES # BLD AUTO: 0.02 10*3/MM3 (ref 0–0.05)
IMM GRANULOCYTES NFR BLD AUTO: 0.2 % (ref 0–0.5)
LIPASE SERPL-CCNC: 33 U/L (ref 13–60)
LYMPHOCYTES # BLD AUTO: 3.74 10*3/MM3 (ref 0.7–3.1)
LYMPHOCYTES NFR BLD AUTO: 33.8 % (ref 19.6–45.3)
MAGNESIUM SERPL-MCNC: 2.1 MG/DL (ref 1.6–2.6)
MCH RBC QN AUTO: 28.7 PG (ref 26.6–33)
MCHC RBC AUTO-ENTMCNC: 32.7 G/DL (ref 31.5–35.7)
MCV RBC AUTO: 87.8 FL (ref 79–97)
MONOCYTES # BLD AUTO: 0.63 10*3/MM3 (ref 0.1–0.9)
MONOCYTES NFR BLD AUTO: 5.7 % (ref 5–12)
NEUTROPHILS NFR BLD AUTO: 58.4 % (ref 42.7–76)
NEUTROPHILS NFR BLD AUTO: 6.48 10*3/MM3 (ref 1.7–7)
NRBC BLD AUTO-RTO: 0 /100 WBC (ref 0–0.2)
NT-PROBNP SERPL-MCNC: <36 PG/ML (ref 0–450)
PLATELET # BLD AUTO: 279 10*3/MM3 (ref 140–450)
PMV BLD AUTO: 10.1 FL (ref 6–12)
POTASSIUM SERPL-SCNC: 3.4 MMOL/L (ref 3.5–5.2)
PROT SERPL-MCNC: 7.2 G/DL (ref 6–8.5)
RBC # BLD AUTO: 5.15 10*6/MM3 (ref 3.77–5.28)
RSV RNA NPH QL NAA+NON-PROBE: NOT DETECTED
SARS-COV-2 RNA RESP QL NAA+PROBE: NOT DETECTED
SODIUM SERPL-SCNC: 140 MMOL/L (ref 136–145)
TROPONIN T SERPL HS-MCNC: <6 NG/L
WBC NRBC COR # BLD AUTO: 11.08 10*3/MM3 (ref 3.4–10.8)
WHOLE BLOOD HOLD COAG: NORMAL
WHOLE BLOOD HOLD SPECIMEN: NORMAL

## 2023-12-01 PROCEDURE — 85025 COMPLETE CBC W/AUTO DIFF WBC: CPT | Performed by: EMERGENCY MEDICINE

## 2023-12-01 PROCEDURE — 80053 COMPREHEN METABOLIC PANEL: CPT | Performed by: EMERGENCY MEDICINE

## 2023-12-01 PROCEDURE — 36415 COLL VENOUS BLD VENIPUNCTURE: CPT

## 2023-12-01 PROCEDURE — 87637 SARSCOV2&INF A&B&RSV AMP PRB: CPT | Performed by: EMERGENCY MEDICINE

## 2023-12-01 PROCEDURE — 99284 EMERGENCY DEPT VISIT MOD MDM: CPT

## 2023-12-01 PROCEDURE — 84484 ASSAY OF TROPONIN QUANT: CPT | Performed by: EMERGENCY MEDICINE

## 2023-12-01 PROCEDURE — 93005 ELECTROCARDIOGRAM TRACING: CPT

## 2023-12-01 PROCEDURE — 83735 ASSAY OF MAGNESIUM: CPT | Performed by: EMERGENCY MEDICINE

## 2023-12-01 PROCEDURE — 93005 ELECTROCARDIOGRAM TRACING: CPT | Performed by: EMERGENCY MEDICINE

## 2023-12-01 PROCEDURE — 83690 ASSAY OF LIPASE: CPT | Performed by: EMERGENCY MEDICINE

## 2023-12-01 PROCEDURE — 83880 ASSAY OF NATRIURETIC PEPTIDE: CPT | Performed by: EMERGENCY MEDICINE

## 2023-12-01 PROCEDURE — 71045 X-RAY EXAM CHEST 1 VIEW: CPT

## 2023-12-01 RX ORDER — ASPIRIN 81 MG/1
324 TABLET, CHEWABLE ORAL ONCE
Status: DISCONTINUED | OUTPATIENT
Start: 2023-12-01 | End: 2023-12-02 | Stop reason: HOSPADM

## 2023-12-01 RX ORDER — SODIUM CHLORIDE 0.9 % (FLUSH) 0.9 %
10 SYRINGE (ML) INJECTION AS NEEDED
Status: DISCONTINUED | OUTPATIENT
Start: 2023-12-01 | End: 2023-12-02 | Stop reason: HOSPADM

## 2023-12-02 NOTE — DISCHARGE INSTRUCTIONS
Continue current medications.  Drink plenty fluids.  Avoid caffeine or other stimulants.  Return for worsening symptoms.  Follow-up with your doctor on Monday.

## 2023-12-02 NOTE — ED PROVIDER NOTES
Time: 8:20 PM EST  Date of encounter:  12/1/2023  Independent Historian/Clinical History and Information was obtained by:   Patient    History is limited by: N/A    Chief Complaint   Patient presents with    Chest Pain    Nausea    Shortness of Breath         History of Present Illness:  Patient is a 28 y.o. year old female who presents to the emergency department for evaluation of chest pain that started earlier this morning, also reports nausea throughout the day.  Patient reports she had was feeling hot and sweaty tonight.  Patient reports chest pain is intermittent, reports mild shortness of breath.  Patient states she has a history of PVCs which has caused her chest pain in the past.  Patient also reports general malaise and congestion since last night.  (MICHEAL Ibrahim, provider in triage)      Patient Care Team  Primary Care Provider: Meredith Alarcon APRN    Past Medical History:     Allergies   Allergen Reactions    Ciprofloxacin Rash    Doxycycline Hyclate Rash    Doxycycline Monohydrate Rash    Latex Rash    Sulfa Antibiotics Rash    Sulfamethoxazole-Trimethoprim Hives and Rash    Trimethoprim Rash     Past Medical History:   Diagnosis Date    Anxiety     Asthma     PRN INHALERS    Borderline diabetic     NO MEDICATIONS AS OF YET TRYING CHANGE OF DIET- DOESN'T CHECK BG AT HOME    Clostridium difficile diarrhea 01/30/2022    DENIES ANY CURRENT ISSUES    Depression     GERD (gastroesophageal reflux disease)     HL (hearing loss)     Hypertension     Irritable bowel syndrome     Kidney stones     Migraine without aura     Obesity     Ovarian cyst     right ovary     PONV (postoperative nausea and vomiting)     PVC (premature ventricular contraction)     FOLLOWED BY DR FIGUEROA HAS SCHEDULED ECHO ON 7/3/23 . REPORTS ISSUES WITH UPPER LEFT CHEST PAIN BUT STATES IS SORE WITH PALPATION ALSO REPORTS SOA WITH EXERTION. ACTIVE WORKS FULL TIME    Right ventricular cardiac abnormality     DILATATION PER   [facility] NOTE - DENIED CP/SOB    Seasonal allergies     Sleep apnea     DOES NOT USE CPAP    Urinary tract infection     NO CURRENT ISSUES     Past Surgical History:   Procedure Laterality Date    BILATERAL BREAST REDUCTION Bilateral 7/11/2023    Procedure: BREAST REDUCTION BILATERAL EXCISION OF LEFT UPPER CHEST MOLE;  Surgeon: Mariah Lieberman MD;  Location: [facility] OR [facility];  Service: Plastics;  Laterality: Bilateral;    COLONOSCOPY  2020    COLONOSCOPY N/A 7/11/2022    Procedure: COLONOSCOPY BIOPSY;  Surgeon: David Yip MD;  Location: [facility] ENDOSCOPY;  Service: Gastroenterology;  Laterality: N/A;  NORMAL COLON    ENDOSCOPY      EXCISION LESION Left 7/11/2023    Procedure: BREAST REDUCTION BILATERAL EXCISION OF LEFT UPPER CHEST MOLE;  Surgeon: Mariah Lieberman MD;  Location: [facility] OR [facility];  Service: Plastics;  Laterality: Left;    KIDNEY STONE SURGERY  11/2021    URETEROSCOPY LASER LITHOTRIPSY WITH STENT INSERTION Left 11/13/2021    Procedure: URETEROSCOPY LASER LITHOTRIPSY WITH STENT INSERTION STONE BASKET EXTRACTION;  Surgeon: Colt Sanders MD;  Location: [facility] MAIN OR;  Service: Urology;  Laterality: Left;    URETEROSCOPY LASER LITHOTRIPSY WITH STENT INSERTION Right 8/18/2023    Procedure: CYSTOSCOPY URETEROSCOPY RETROGRADE PYELOGRAM HOLMIUM LASER STENT INSERTION, right;  Surgeon: Bailee Edgar MD;  Location: [facility] MAIN OR;  Service: Urology;  Laterality: Right;     Family History   Problem Relation Age of Onset    Heart failure Mother     Early death Mother     Asthma Sister     Depression Sister     Vision loss Sister     Other Sister     Cancer Maternal Aunt     Skin cancer Maternal Grandmother     Anxiety disorder Maternal Grandmother     Colon cancer Maternal Grandfather     Heart failure Maternal Grandfather     Diabetes Maternal Grandfather     Heart disease Maternal Grandfather     Hyperlipidemia Maternal Grandfather     Colon cancer Paternal Grandfather     Heart  disease Maternal Great-Grandfather     Breast cancer Other 60    Ovarian cancer Neg Hx     Uterine cancer Neg Hx     Prostate cancer Neg Hx        Home Medications:  Prior to Admission medications    Medication Sig Start Date End Date Taking? Authorizing Provider   albuterol sulfate  (90 Base) MCG/ACT inhaler INHALE 2 PUFFS INTO THE LUNGS EVERY 4 (FOUR) HOURS AS NEEDED FOR SHORTNESS OF AIR. 8/22/22   Eric Amaya MD   bisoprolol (ZEBeta) 5 MG tablet Take 0.5 tablets by mouth Daily.    ProviderEric MD   doxazosin (Cardura) 2 MG tablet Take 1 tablet by mouth Daily for 10 days. 8/18/23 8/28/23  Bailee Edgar MD   glucose blood test strip Use as instructed 3/20/23   Felicita Marcus APRN   glucose monitor monitoring kit 1 each As Needed (as needed to monitor daily blood glucose levels). 3/20/23   Felicita Marcus APRN   ketorolac (TORADOL) 10 MG tablet Take 1 tablet by mouth Every 6 (Six) Hours As Needed for Moderate Pain. 8/18/23   Bailee Edgar MD   lisinopril (PRINIVIL,ZESTRIL) 10 MG tablet Take 1 tablet by mouth Daily. 9/22/23   Meredith Alarcon APRN   mupirocin (BACTROBAN) 2 % ointment Apply 1 application  topically to the appropriate area as directed 3 (Three) Times a Day. 10/31/23   Tod Carl MD   oxybutynin (DITROPAN) 5 MG tablet Take 1 tablet by mouth 3 (Three) Times a Day As Needed (Bladder spasm). May cause constipation 8/18/23   Bailee Edgar MD   oxyCODONE-acetaminophen (Percocet) 5-325 MG per tablet Take 1-2 tablets by mouth Every 6 (Six) Hours As Needed for Severe Pain. 8/18/23   Bailee Edgar MD   phenazopyridine (Pyridium) 200 MG tablet Take 1 tablet by mouth 3 (Three) Times a Day As Needed for Bladder Spasms (Burning, urinary discomfort). 8/18/23   Bailee Edgar MD   tamsulosin (FLOMAX) 0.4 MG capsule 24 hr capsule Take 1 capsule by mouth Daily. 8/16/23   Shabbir, Yusra, PA   fluticasone (FLONASE) 50 MCG/ACT nasal spray 2 sprays into  "the nostril(s) as directed by provider Daily. 7/25/21 9/19/21  Rosio Mccoy, APRN        Social History:   Social History     Tobacco Use    Smoking status: Never     Passive exposure: Yes    Smokeless tobacco: Never    Tobacco comments:     secondhand smoke exposure   Vaping Use    Vaping Use: Never used    Passive vaping exposure: Yes   Substance Use Topics    Alcohol use: Never    Drug use: Never         Review of Systems:  Review of Systems   HENT:  Positive for congestion.    Respiratory:  Positive for shortness of breath.    Cardiovascular:  Positive for chest pain.        Physical Exam:  /70   Pulse 64   Temp 98.6 °F (37 °C) (Oral)   Resp 20   Ht 162.6 cm (64\")   Wt 120 kg (264 lb 6.4 oz)   SpO2 97%   BMI 45.38 kg/m²         Physical Exam  HENT:      Head: Normocephalic.      Mouth/Throat:      Mouth: Mucous membranes are moist.   Eyes:      Pupils: Pupils are equal, round, and reactive to light.   Pulmonary:      Effort: Pulmonary effort is normal.   Abdominal:      General: There is no distension.   Musculoskeletal:      Cervical back: Neck supple.   Skin:     General: Skin is warm and dry.   Neurological:      General: No focal deficit present.      Mental Status: She is alert and oriented to person, place, and time.   Psychiatric:         Mood and Affect: Mood normal.         Behavior: Behavior normal.                      Procedures:  Procedures      Medical Decision Making:      Comorbidities that affect care:    Prior arrhythmia    External Notes reviewed:    Previous Clinic Note:  visit for ingrown toenail      The following orders were placed and all results were independently analyzed by me:  Orders Placed This Encounter   Procedures    COVID PRE-OP / PRE-PROCEDURE SCREENING ORDER (NO ISOLATION) - Swab, Nasopharynx    COVID-19, FLU A/B, RSV PCR 1 HR TAT - Swab, Nasopharynx    XR Chest 1 View    [place] Draw    High Sensitivity Troponin T    Comprehensive Metabolic Panel    " Lipase    BNP    Magnesium    CBC Auto Differential    Undress & Gown    Continuous Pulse Oximetry    CBC & Differential    Green Top (Gel)    Lavender Top    Gold Top - SST    Light Blue Top       Medications Given in the Emergency Department:  Medications - No data to display       ED Course:    The patient was initially evaluated in the triage area where orders were placed. The patient was later dispositioned by Srikanth De La Rosa DO.      The patient was advised to stay for completion of workup which includes but is not limited to communication of labs and radiological results, reassessment and plan. The patient was advised that leaving prior to disposition by a provider could result in critical findings that are not communicated to the patient.          Labs:      Results for orders placed or performed during the hospital encounter of 12/01/23   COVID-19, FLU A/B, RSV PCR 1 HR TAT - Swab, Nasopharynx    Specimen: Nasopharynx; Swab   Result Value Ref Range    COVID19 Not Detected Not Detected - Ref. Range    Influenza A PCR Not Detected Not Detected    Influenza B PCR Not Detected Not Detected    RSV, PCR Not Detected Not Detected   High Sensitivity Troponin T    Specimen: Blood   Result Value Ref Range    HS Troponin T <6 <14 ng/L   Comprehensive Metabolic Panel    Specimen: Blood   Result Value Ref Range    Glucose 115 (H) 65 - 99 mg/dL    BUN 11 6 - 20 mg/dL    Creatinine 0.96 0.57 - 1.00 mg/dL    Sodium 140 136 - 145 mmol/L    Potassium 3.4 (L) 3.5 - 5.2 mmol/L    Chloride 102 98 - 107 mmol/L    CO2 26.2 22.0 - 29.0 mmol/L    Calcium 9.9 8.6 - 10.5 mg/dL    Total Protein 7.2 6.0 - 8.5 g/dL    Albumin 4.5 3.5 - 5.2 g/dL    ALT (SGPT) 19 1 - 33 U/L    AST (SGOT) 18 1 - 32 U/L    Alkaline Phosphatase 114 39 - 117 U/L    Total Bilirubin 0.3 0.0 - 1.2 mg/dL    Globulin 2.7 gm/dL    A/G Ratio 1.7 g/dL    BUN/Creatinine Ratio 11.5 7.0 - 25.0    Anion Gap 11.8 5.0 - 15.0 mmol/L    eGFR 83.9 >60.0 mL/min/1.73    Lipase    Specimen: Blood   Result Value Ref Range    Lipase 33 13 - 60 U/L   BNP    Specimen: Blood   Result Value Ref Range    proBNP <36.0 0.0 - 450.0 pg/mL   Magnesium    Specimen: Blood   Result Value Ref Range    Magnesium 2.1 1.6 - 2.6 mg/dL   CBC Auto Differential    Specimen: Blood   Result Value Ref Range    WBC 11.08 (H) 3.40 - 10.80 10*3/mm3    RBC 5.15 3.77 - 5.28 10*6/mm3    Hemoglobin 14.8 12.0 - 15.9 g/dL    Hematocrit 45.2 34.0 - 46.6 %    MCV 87.8 79.0 - 97.0 fL    MCH 28.7 26.6 - 33.0 pg    MCHC 32.7 31.5 - 35.7 g/dL    RDW 12.7 12.3 - 15.4 %    RDW-SD 40.8 37.0 - 54.0 fl    MPV 10.1 6.0 - 12.0 fL    Platelets 279 140 - 450 10*3/mm3    Neutrophil % 58.4 42.7 - 76.0 %    Lymphocyte % 33.8 19.6 - 45.3 %    Monocyte % 5.7 5.0 - 12.0 %    Eosinophil % 1.0 0.3 - 6.2 %    Basophil % 0.9 0.0 - 1.5 %    Immature Grans % 0.2 0.0 - 0.5 %    Neutrophils, Absolute 6.48 1.70 - 7.00 10*3/mm3    Lymphocytes, Absolute 3.74 (H) 0.70 - 3.10 10*3/mm3    Monocytes, Absolute 0.63 0.10 - 0.90 10*3/mm3    Eosinophils, Absolute 0.11 0.00 - 0.40 10*3/mm3    Basophils, Absolute 0.10 0.00 - 0.20 10*3/mm3    Immature Grans, Absolute 0.02 0.00 - 0.05 10*3/mm3    nRBC 0.0 0.0 - 0.2 /100 WBC   ECG 12 Lead ED Triage Standing Order; Chest Pain   Result Value Ref Range    QT Interval 357 ms    QTC Interval 406 ms   Green Top (Gel)   Result Value Ref Range    Extra Tube Hold for add-ons.    Lavender Top   Result Value Ref Range    Extra Tube hold for add-on    Gold Top - SST   Result Value Ref Range    Extra Tube Hold for add-ons.    Light Blue Top   Result Value Ref Range    Extra Tube Hold for add-ons.        Lab Results (last 24 hours)       ** No results found for the last 24 hours. **             Imaging:      XR Chest 1 View    Result Date: 12/1/2023  Narrative: PROCEDURE: XR CHEST 1 VW  COMPARISON: [facility], CR, XR CHEST 1 VW, 12/30/2022, 10:26.  INDICATIONS: Chest Pain  FINDINGS:  The heart is normal in  size.  The lungs are well-expanded and free of infiltrates.  Bony structures appear intact.      Impression:  No active disease is seen.       ELENA ELI MD       Electronically Signed and Approved By: ELENA ELI MD on 12/01/2023 at 20:29                No Radiology Exams Resulted Within Past 24 Hours      Differential Diagnosis and Discussion:      Chest Pain:  Based on the patient's signs and symptoms, I considered aortic dissection, myocardial infaction, pulmonary embolism, cardiac tamponade, pericarditis, pneumothorax, musculoskeletal chest pain and other differential diagnosis as an etiology of the patient's chest pain.     All labs were reviewed and interpreted by me.  EKG was interpreted by me.    MDM     Amount and/or Complexity of Data Reviewed  Clinical lab tests: reviewed  Tests in the radiology section of CPT®: reviewed  Tests in the medicine section of CPT®: reviewed                 Patient Care Considerations:    CT CHEST: I considered ordering a CT scan of the chest, however the patient is currently asymptomatic and has no sign of pE      Consultants/Shared Management Plan:    None    Social Determinants of Health:    Patient is independent, reliable, and has access to care.       Disposition and Care Coordination:    Discharged: The patient is suitable and stable for discharge with no need for consideration of observation or admission.    I have explained discharge medications and the need for follow up with the patient/caretakers. This was also printed in the discharge instructions. Patient was discharged with the following medications and follow up:      Medication List      No changes were made to your prescriptions during this visit.      Meredith Alarcon, APRBRADLEY  [address] [address]  [phone]    In 3 days         Final diagnoses:   Chest pain, unspecified type        ED Disposition       ED Disposition   Discharge    Condition   Stable    Comment   --                This medical record created using voice recognition software.             Srikanth De La Rosa, DO  12/04/23 1145

## 2023-12-04 LAB
QT INTERVAL: 357 MS
QTC INTERVAL: 406 MS

## 2023-12-07 PROCEDURE — 87077 CULTURE AEROBIC IDENTIFY: CPT | Performed by: NURSE PRACTITIONER

## 2023-12-07 PROCEDURE — 87086 URINE CULTURE/COLONY COUNT: CPT | Performed by: NURSE PRACTITIONER

## 2023-12-08 ENCOUNTER — TELEPHONE (OUTPATIENT)
Dept: URGENT CARE | Facility: CLINIC | Age: 28
End: 2023-12-08
Payer: MEDICAID

## 2023-12-08 NOTE — TELEPHONE ENCOUNTER
----- Message from MICHEAL Taylor sent at 12/8/2023 12:37 PM EST -----  Please call the patient regarding her urine culture result.  Normal urogenital nunu revealed.  Since she is symptomatic, she should finish course of antibiotics.  She should follow-up with PCP for persistent or worsening symptoms.

## 2024-01-11 ENCOUNTER — TELEPHONE (OUTPATIENT)
Dept: UROLOGY | Facility: CLINIC | Age: 29
End: 2024-01-11

## 2024-01-13 ENCOUNTER — HOSPITAL ENCOUNTER (OUTPATIENT)
Dept: ULTRASOUND IMAGING | Facility: HOSPITAL | Age: 29
Discharge: HOME | End: 2024-01-13
Admitting: UROLOGY
Payer: COMMERCIAL

## 2024-01-13 DIAGNOSIS — N13.30 HYDRONEPHROSIS, UNSPECIFIED HYDRONEPHROSIS TYPE: ICD-10-CM

## 2024-01-13 PROCEDURE — 76775 US EXAM ABDO BACK WALL LIM: CPT

## 2024-01-23 PROCEDURE — 87086 URINE CULTURE/COLONY COUNT: CPT | Performed by: NURSE PRACTITIONER

## 2024-01-24 ENCOUNTER — TELEPHONE (OUTPATIENT)
Dept: UROLOGY | Facility: CLINIC | Age: 29
End: 2024-01-24

## 2024-01-24 NOTE — PROGRESS NOTES
"Post-op Follow-up (6m post op bbr)    Subjective  I have a spot on the left that is sore          History of Present Illness  Kinsey Suresh is a 28 y.o. female who presents to [facility] as Postoperative Follow-Up from bilateral breast reduction and EXCISION OF LEFT UPPER CHEST MOLE 7/11/23.    Pt states she has 2 areas in middle under breast that does not want to heal.    Allergies: Ciprofloxacin, Doxycycline hyclate, Doxycycline monohydrate, Latex, Sulfa antibiotics, Sulfamethoxazole-trimethoprim, and Trimethoprim  Allergies Reconciled.    Review of Systems   Constitutional: Negative.    HENT: Negative.     Eyes: Negative.    Respiratory: Negative.     Cardiovascular: Negative.    Gastrointestinal: Negative.    Endocrine: Negative.    Genitourinary: Negative.    Musculoskeletal: Negative.    Skin: Negative.    Allergic/Immunologic: Negative.    Neurological: Negative.    Hematological: Negative.    Psychiatric/Behavioral: Negative.        All review of system has been reviewed and it  is negative except the ones note above.     Objective     /81 (BP Location: Left arm, Patient Position: Sitting, Cuff Size: Adult)   Pulse 94   Temp 98.7 °F (37.1 °C) (Temporal)   Ht 162.6 cm (64.02\")   Wt 121 kg (267 lb)   SpO2 96%   BMI 45.81 kg/m²     Body mass index is 45.81 kg/m².    Physical Exam   Vitals reviewed.  Constitutional  She appears well-developed and well-nourished.     Eyes  System normal.       Cardiovascular: Normal rate.     Pulmonary/Chest  Effort normal.     Skin  Skin is warm and dry.     Psychiatric  She has a normal mood and affect.     Breast    Additional breast conditions include the following:   Right Breast: Her right breast has a anchor scar.   Left Breast: Her left breast has a anchor scar.         Cardiovascular: Normal rate    Pulmonary/Chest: Effort normal    Breast: incisions healed well, scars are pink around areola and wide at " the beginning incisions of bilateral breasts, sternum, raised and nodular with some tenderness and discomfort on left breast scar, no open areas.                                                        Result Review : no new results to review this visit      Assessment and Plan      Diagnoses and all orders for this visit:    1. Hypertrophic scar (Primary)    2. Macromastia    3. S/P bilateral breast reduction      Plan:   Photos obtained today. Patient is happy with breast size but is wanting to improve her scars. Patient has wide, irregular raised scars at the medial end of  incisions of bilateral breasts. Instructed patient to try silicone scar sheets at night on incisions. Discussed possible steroid injection but patient declined at this time and wanted to wait if she seen some improvement with scar sheets. Instructed her to use for at least 1 month.  Advised patient to call the office and let us know if she wants to proceed with steroid injection. She verbalized understanding of instructions.   RTC PRN.    Scribed by Bailee Meneses MA, acting as a scribe for MICHEAL Khan, 02/02/24 16:12 EST.  MICHEAL Khan's signature on the note affirms that the note adequately documents the care provided.      Patient was given instructions and counseling regarding her condition. Please see specific information pulled into the AVS if appropriate.     MICHEAL Khan  02/02/2024

## 2024-02-02 ENCOUNTER — OFFICE VISIT (OUTPATIENT)
Dept: PLASTIC SURGERY | Facility: CLINIC | Age: 29
End: 2024-02-02
Payer: COMMERCIAL

## 2024-02-02 VITALS
DIASTOLIC BLOOD PRESSURE: 81 MMHG | HEIGHT: 64 IN | TEMPERATURE: 98.7 F | OXYGEN SATURATION: 96 % | SYSTOLIC BLOOD PRESSURE: 117 MMHG | WEIGHT: 267 LBS | BODY MASS INDEX: 45.58 KG/M2 | HEART RATE: 94 BPM

## 2024-02-02 DIAGNOSIS — N62 MACROMASTIA: ICD-10-CM

## 2024-02-02 DIAGNOSIS — Z98.890 S/P BILATERAL BREAST REDUCTION: ICD-10-CM

## 2024-02-02 DIAGNOSIS — L91.0 HYPERTROPHIC SCAR: Primary | ICD-10-CM

## 2024-02-18 ENCOUNTER — HOSPITAL ENCOUNTER (EMERGENCY)
Facility: HOSPITAL | Age: 29
Discharge: HOME | End: 2024-02-19
Attending: EMERGENCY MEDICINE | Admitting: EMERGENCY MEDICINE
Payer: COMMERCIAL

## 2024-02-18 DIAGNOSIS — K29.00 ACUTE GASTRITIS WITHOUT HEMORRHAGE, UNSPECIFIED GASTRITIS TYPE: Primary | ICD-10-CM

## 2024-02-18 LAB
BASOPHILS # BLD AUTO: 0.08 10*3/MM3 (ref 0–0.2)
BASOPHILS NFR BLD AUTO: 0.7 % (ref 0–1.5)
DEPRECATED RDW RBC AUTO: 39.7 FL (ref 37–54)
EOSINOPHIL # BLD AUTO: 0.17 10*3/MM3 (ref 0–0.4)
EOSINOPHIL NFR BLD AUTO: 1.5 % (ref 0.3–6.2)
ERYTHROCYTE [DISTWIDTH] IN BLOOD BY AUTOMATED COUNT: 12.2 % (ref 12.3–15.4)
HCT VFR BLD AUTO: 45 % (ref 34–46.6)
HGB BLD-MCNC: 14.6 G/DL (ref 12–15.9)
IMM GRANULOCYTES # BLD AUTO: 0.04 10*3/MM3 (ref 0–0.05)
IMM GRANULOCYTES NFR BLD AUTO: 0.4 % (ref 0–0.5)
LYMPHOCYTES # BLD AUTO: 4.55 10*3/MM3 (ref 0.7–3.1)
LYMPHOCYTES NFR BLD AUTO: 40.2 % (ref 19.6–45.3)
MCH RBC QN AUTO: 28.5 PG (ref 26.6–33)
MCHC RBC AUTO-ENTMCNC: 32.4 G/DL (ref 31.5–35.7)
MCV RBC AUTO: 87.9 FL (ref 79–97)
MONOCYTES # BLD AUTO: 0.8 10*3/MM3 (ref 0.1–0.9)
MONOCYTES NFR BLD AUTO: 7.1 % (ref 5–12)
NEUTROPHILS NFR BLD AUTO: 5.69 10*3/MM3 (ref 1.7–7)
NEUTROPHILS NFR BLD AUTO: 50.1 % (ref 42.7–76)
NRBC BLD AUTO-RTO: 0 /100 WBC (ref 0–0.2)
PLATELET # BLD AUTO: 285 10*3/MM3 (ref 140–450)
PMV BLD AUTO: 10.5 FL (ref 6–12)
RBC # BLD AUTO: 5.12 10*6/MM3 (ref 3.77–5.28)
WBC NRBC COR # BLD AUTO: 11.33 10*3/MM3 (ref 3.4–10.8)

## 2024-02-18 PROCEDURE — 36415 COLL VENOUS BLD VENIPUNCTURE: CPT | Performed by: EMERGENCY MEDICINE

## 2024-02-18 PROCEDURE — 99283 EMERGENCY DEPT VISIT LOW MDM: CPT

## 2024-02-18 PROCEDURE — 93005 ELECTROCARDIOGRAM TRACING: CPT | Performed by: EMERGENCY MEDICINE

## 2024-02-18 PROCEDURE — 85025 COMPLETE CBC W/AUTO DIFF WBC: CPT | Performed by: EMERGENCY MEDICINE

## 2024-02-18 PROCEDURE — 80053 COMPREHEN METABOLIC PANEL: CPT | Performed by: EMERGENCY MEDICINE

## 2024-02-18 PROCEDURE — 93010 ELECTROCARDIOGRAM REPORT: CPT | Performed by: SPECIALIST

## 2024-02-18 NOTE — Clinical Note
[facility] EMERGENCY ROOM  [address]  [place] [address]  Phone: [phone]    Kinsey Suresh was seen and treated in our emergency department on 2/18/2024.  She may return to work on 02/20/2024.         Thank you for choosing [facility].    Ashok Etienne MD

## 2024-02-19 VITALS
TEMPERATURE: 98 F | HEIGHT: 64 IN | RESPIRATION RATE: 16 BRPM | HEART RATE: 115 BPM | OXYGEN SATURATION: 95 % | SYSTOLIC BLOOD PRESSURE: 92 MMHG | BODY MASS INDEX: 45.5 KG/M2 | DIASTOLIC BLOOD PRESSURE: 50 MMHG | WEIGHT: 266.54 LBS

## 2024-02-19 LAB
ALBUMIN SERPL-MCNC: 4.1 G/DL (ref 3.5–5.2)
ALBUMIN/GLOB SERPL: 1.5 G/DL
ALP SERPL-CCNC: 117 U/L (ref 39–117)
ALT SERPL W P-5'-P-CCNC: 18 U/L (ref 1–33)
ANION GAP SERPL CALCULATED.3IONS-SCNC: 13 MMOL/L (ref 5–15)
AST SERPL-CCNC: 15 U/L (ref 1–32)
BILIRUB SERPL-MCNC: 0.3 MG/DL (ref 0–1.2)
BUN SERPL-MCNC: 9 MG/DL (ref 6–20)
BUN/CREAT SERPL: 10 (ref 7–25)
CALCIUM SPEC-SCNC: 9.1 MG/DL (ref 8.6–10.5)
CHLORIDE SERPL-SCNC: 102 MMOL/L (ref 98–107)
CO2 SERPL-SCNC: 25 MMOL/L (ref 22–29)
CREAT SERPL-MCNC: 0.9 MG/DL (ref 0.57–1)
EGFRCR SERPLBLD CKD-EPI 2021: 90.6 ML/MIN/1.73
GLOBULIN UR ELPH-MCNC: 2.8 GM/DL
GLUCOSE SERPL-MCNC: 139 MG/DL (ref 65–99)
POTASSIUM SERPL-SCNC: 3.4 MMOL/L (ref 3.5–5.2)
PROT SERPL-MCNC: 6.9 G/DL (ref 6–8.5)
QT INTERVAL: 306 MS
QTC INTERVAL: 442 MS
SODIUM SERPL-SCNC: 140 MMOL/L (ref 136–145)

## 2024-02-19 PROCEDURE — 63710000001 ONDANSETRON ODT 4 MG TABLET DISPERSIBLE: Performed by: EMERGENCY MEDICINE

## 2024-02-19 RX ORDER — ONDANSETRON 4 MG/1
4 TABLET, ORALLY DISINTEGRATING ORAL ONCE
Status: COMPLETED | OUTPATIENT
Start: 2024-02-19 | End: 2024-02-19

## 2024-02-19 RX ORDER — ONDANSETRON 4 MG/1
4 TABLET, ORALLY DISINTEGRATING ORAL EVERY 6 HOURS PRN
Qty: 15 TABLET | Refills: 0 | Status: SHIPPED | OUTPATIENT
Start: 2024-02-19

## 2024-02-19 RX ORDER — POTASSIUM CHLORIDE 750 MG/1
40 CAPSULE, EXTENDED RELEASE ORAL ONCE
Status: COMPLETED | OUTPATIENT
Start: 2024-02-19 | End: 2024-02-19

## 2024-02-19 RX ORDER — OMEPRAZOLE 40 MG/1
40 CAPSULE, DELAYED RELEASE ORAL DAILY
Qty: 14 CAPSULE | Refills: 0 | Status: SHIPPED | OUTPATIENT
Start: 2024-02-19

## 2024-02-19 RX ADMIN — ONDANSETRON 4 MG: 4 TABLET, ORALLY DISINTEGRATING ORAL at 00:44

## 2024-02-19 RX ADMIN — POTASSIUM CHLORIDE 40 MEQ: 10 CAPSULE, COATED, EXTENDED RELEASE ORAL at 00:23

## 2024-02-19 NOTE — ED PROVIDER NOTES
Time: 12:28 AM EST  Date of encounter:  2/18/2024  Independent Historian/Clinical History and Information was obtained by:   Patient    History is limited by: N/A    Chief Complaint: Nausea, vomiting, dizziness and generalized weakness      History of Present Illness:  Patient is a 28 y.o. year old female who presents to the emergency department for evaluation of nausea, vomiting, dizziness.  Symptoms came on after eating a THC gummy to help her fall asleep.  She felt bilateral numbness as well.  Significant other at bedside does note that she also ate both [employer]'s today.  She had 1 episode of vomiting here today.  She is not having any significant abdominal pain.  The THC gummy she took tonight is something she has never had that they obtained from the [employer].      HPI    Patient Care Team  Primary Care Provider: Provider, No Known    Past Medical History:     Allergies   Allergen Reactions    Ciprofloxacin Rash    Doxycycline Hyclate Rash    Doxycycline Monohydrate Rash    Latex Rash    Sulfa Antibiotics Rash    Sulfamethoxazole-Trimethoprim Hives and Rash    Trimethoprim Rash     Past Medical History:   Diagnosis Date    Anxiety     Asthma     PRN INHALERS    Borderline diabetic     NO MEDICATIONS AS OF YET TRYING CHANGE OF DIET- DOESN'T CHECK BG AT HOME    Clostridium difficile diarrhea 01/30/2022    DENIES ANY CURRENT ISSUES    Depression     GERD (gastroesophageal reflux disease)     HL (hearing loss)     Hypertension     Irritable bowel syndrome     Kidney stones     Migraine without aura     Obesity     Ovarian cyst     right ovary     PONV (postoperative nausea and vomiting)     PVC (premature ventricular contraction)     FOLLOWED BY DR FIGUEROA HAS SCHEDULED ECHO ON 7/3/23 . REPORTS ISSUES WITH UPPER LEFT CHEST PAIN BUT STATES IS SORE WITH PALPATION ALSO REPORTS SOA WITH EXERTION. ACTIVE WORKS FULL TIME    Right ventricular cardiac abnormality     DILATATION PER DR FIGUEROA NOTE -  DENIED CP/SOB    Seasonal allergies     Sleep apnea     DOES NOT USE CPAP    Urinary tract infection     NO CURRENT ISSUES     Past Surgical History:   Procedure Laterality Date    BILATERAL BREAST REDUCTION Bilateral 7/11/2023    Procedure: BREAST REDUCTION BILATERAL EXCISION OF LEFT UPPER CHEST MOLE;  Surgeon: Mariah Lieberman MD;  Location: [facility] OR [facility] [place];  Service: Plastics;  Laterality: Bilateral;    COLONOSCOPY  2020    COLONOSCOPY N/A 7/11/2022    Procedure: COLONOSCOPY BIOPSY;  Surgeon: David Yip MD;  Location: [facility] ENDOSCOPY;  Service: Gastroenterology;  Laterality: N/A;  NORMAL COLON    ENDOSCOPY      EXCISION LESION Left 7/11/2023    Procedure: BREAST REDUCTION BILATERAL EXCISION OF LEFT UPPER CHEST MOLE;  Surgeon: Mariah Lieberman MD;  Location: [facility] OR [facility] [place];  Service: Plastics;  Laterality: Left;    KIDNEY STONE SURGERY  11/2021    URETEROSCOPY LASER LITHOTRIPSY WITH STENT INSERTION Left 11/13/2021    Procedure: URETEROSCOPY LASER LITHOTRIPSY WITH STENT INSERTION STONE BASKET EXTRACTION;  Surgeon: Colt Sanders MD;  Location: [facility] MAIN OR;  Service: Urology;  Laterality: Left;    URETEROSCOPY LASER LITHOTRIPSY WITH STENT INSERTION Right 8/18/2023    Procedure: CYSTOSCOPY URETEROSCOPY RETROGRADE PYELOGRAM HOLMIUM LASER STENT INSERTION, right;  Surgeon: Bailee Edgar MD;  Location: [facility] MAIN OR;  Service: Urology;  Laterality: Right;     Family History   Problem Relation Age of Onset    Heart failure Mother     Early death Mother     Asthma Sister     Depression Sister     Vision loss Sister     Other Sister     Cancer Maternal Aunt     Skin cancer Maternal Grandmother     Anxiety disorder Maternal Grandmother     Colon cancer Maternal Grandfather     Heart failure Maternal Grandfather     Diabetes Maternal Grandfather     Heart disease Maternal Grandfather     Hyperlipidemia Maternal Grandfather     Colon cancer Paternal Grandfather     Heart disease Maternal  Great-Grandfather     Breast cancer Other 60    Ovarian cancer Neg Hx     Uterine cancer Neg Hx     Prostate cancer Neg Hx        Home Medications:  Prior to Admission medications    Medication Sig Start Date End Date Taking? Authorizing Provider   albuterol sulfate  (90 Base) MCG/ACT inhaler INHALE 2 PUFFS INTO THE LUNGS EVERY 4 (FOUR) HOURS AS NEEDED FOR SHORTNESS OF AIR. 8/22/22   Eric Amaya MD   bisoprolol (ZEBeta) 5 MG tablet Take 0.5 tablets by mouth Daily.    Eric Amaya MD   glucose blood test strip Use as instructed 3/20/23   Felicita Marcus APRN   glucose monitor monitoring kit 1 each As Needed (as needed to monitor daily blood glucose levels). 3/20/23   Felicita Marcus APRN   fluticasone (FLONASE) 50 MCG/ACT nasal spray 2 sprays into the nostril(s) as directed by provider Daily. 7/25/21 9/19/21  Rosio Mccoy APRN        Social History:   Social History     Tobacco Use    Smoking status: Never     Passive exposure: Yes    Smokeless tobacco: Never    Tobacco comments:     secondhand smoke exposure   Vaping Use    Vaping Use: Never used    Passive vaping exposure: Yes   Substance Use Topics    Alcohol use: Never    Drug use: Never         Review of Systems:  Review of Systems   Constitutional:  Negative for chills and fever.   HENT:  Negative for congestion, rhinorrhea and sore throat.    Eyes:  Negative for photophobia.   Respiratory:  Negative for apnea, cough, chest tightness and shortness of breath.    Cardiovascular:  Negative for chest pain and palpitations.   Gastrointestinal:  Positive for nausea and vomiting. Negative for abdominal pain and diarrhea.   Endocrine: Negative.    Genitourinary:  Negative for difficulty urinating and dysuria.   Musculoskeletal:  Negative for back pain, joint swelling and myalgias.   Skin:  Negative for color change and wound.   Allergic/Immunologic: Negative.    Neurological:  Positive for dizziness, light-headedness and  "numbness. Negative for seizures and headaches.   Psychiatric/Behavioral:  Positive for decreased concentration and dysphoric mood.    All other systems reviewed and are negative.       Physical Exam:  BP 92/50   Pulse 115   Temp 98 °F (36.7 °C)   Resp 16   Ht 162.6 cm (64\")   Wt 121 kg (266 lb 8.6 oz)   SpO2 95%   BMI 45.75 kg/m²     Physical Exam  Vitals and nursing note reviewed.   Constitutional:       General: She is awake.      Appearance: Normal appearance.   HENT:      Head: Normocephalic and atraumatic.      Nose: Nose normal.      Mouth/Throat:      Mouth: Mucous membranes are moist.   Eyes:      Extraocular Movements: Extraocular movements intact.      Pupils: Pupils are equal, round, and reactive to light.   Cardiovascular:      Rate and Rhythm: Normal rate and regular rhythm.      Heart sounds: Normal heart sounds.   Pulmonary:      Effort: Pulmonary effort is normal. No respiratory distress.      Breath sounds: Normal breath sounds. No wheezing, rhonchi or rales.   Abdominal:      General: Bowel sounds are normal.      Palpations: Abdomen is soft.      Tenderness: There is no abdominal tenderness. There is no guarding or rebound.      Comments: No rigidity   Musculoskeletal:         General: No tenderness. Normal range of motion.      Cervical back: Normal range of motion and neck supple.   Skin:     General: Skin is warm and dry.      Coloration: Skin is not jaundiced.   Neurological:      General: No focal deficit present.      Mental Status: She is alert. Mental status is at baseline.   Psychiatric:         Mood and Affect: Mood normal.                  Procedures:  Procedures      Medical Decision Making:      Comorbidities that affect care:    Anxiety, migraines, kidney stones, GERD, depression    External Notes reviewed:    Previous Clinic Note: Office visit with plastic search 2-24.  Description: Hypertrophic scar; macromastia status post bilateral breast reduction      The following " orders were placed and all results were independently analyzed by me:  Orders Placed This Encounter   Procedures    Comprehensive Metabolic Panel    CBC Auto Differential    ECG 12 Lead Tachycardia    CBC & Differential       Medications Given in the Emergency Department:  Medications   ondansetron ODT (ZOFRAN-ODT) disintegrating tablet 4 mg (has no administration in time range)   potassium chloride (MICRO-K/KLOR-CON) CR capsule (40 mEq Oral Given 2/19/24 0023)        ED Course:    ED Course as of 02/19/24 0035   Mon Feb 19, 2024 0010 I have personally interpreted the EKG today and it shows no evidence of any acute ischemia or heart arrhythmia. [RP]      ED Course User Index  [RP] Ashok Etienne MD       Labs:    Lab Results (last 24 hours)       Procedure Component Value Units Date/Time    CBC & Differential [[id number]]  (Abnormal) Collected: 02/18/24 2322    Specimen: Blood from Hand, Left Updated: 02/18/24 2345    Narrative:      The following orders were created for panel order CBC & Differential.  Procedure                               Abnormality         Status                     ---------                               -----------         ------                     CBC Auto Differential[[id number]]        Abnormal            Final result                 Please view results for these tests on the individual orders.    Comprehensive Metabolic Panel [[id number]]  (Abnormal) Collected: 02/18/24 2322    Specimen: Blood from Hand, Left Updated: 02/19/24 0004     Glucose 139 mg/dL      BUN 9 mg/dL      Creatinine 0.90 mg/dL      Sodium 140 mmol/L      Potassium 3.4 mmol/L      Chloride 102 mmol/L      CO2 25.0 mmol/L      Calcium 9.1 mg/dL      Total Protein 6.9 g/dL      Albumin 4.1 g/dL      ALT (SGPT) 18 U/L      AST (SGOT) 15 U/L      Alkaline Phosphatase 117 U/L      Total Bilirubin 0.3 mg/dL      Globulin 2.8 gm/dL      A/G Ratio 1.5 g/dL      BUN/Creatinine Ratio 10.0     Anion Gap 13.0 mmol/L      eGFR  90.6 mL/min/1.73     Narrative:      GFR Normal >60  Chronic Kidney Disease <60  Kidney Failure <15      CBC Auto Differential [[id number]]  (Abnormal) Collected: 02/18/24 2322    Specimen: Blood from Hand, Left Updated: 02/18/24 7480     WBC 11.33 10*3/mm3      RBC 5.12 10*6/mm3      Hemoglobin 14.6 g/dL      Hematocrit 45.0 %      MCV 87.9 fL      MCH 28.5 pg      MCHC 32.4 g/dL      RDW 12.2 %      RDW-SD 39.7 fl      MPV 10.5 fL      Platelets 285 10*3/mm3      Neutrophil % 50.1 %      Lymphocyte % 40.2 %      Monocyte % 7.1 %      Eosinophil % 1.5 %      Basophil % 0.7 %      Immature Grans % 0.4 %      Neutrophils, Absolute 5.69 10*3/mm3      Lymphocytes, Absolute 4.55 10*3/mm3      Monocytes, Absolute 0.80 10*3/mm3      Eosinophils, Absolute 0.17 10*3/mm3      Basophils, Absolute 0.08 10*3/mm3      Immature Grans, Absolute 0.04 10*3/mm3      nRBC 0.0 /100 WBC              Imaging:    No Radiology Exams Resulted Within Past 24 Hours      Differential Diagnosis and Discussion:    Altered Mental Status: Based on the patient's signs and symptoms, differential diagnosis includes but is not limited to meningitis, stroke, sepsis, subarachnoid hemorrhage, intracranial bleeding, encephalitis, and metabolic encephalopathy.    All labs were reviewed and interpreted by me.  EKG was interpreted by me.    MDM     Amount and/or Complexity of Data Reviewed  Clinical lab tests: reviewed  Tests in the medicine section of CPT®: reviewed                 Patient Care Considerations:    MRI: I considered ordering an MRI however patient has no focal neurologic deficits.  Her altered mentation and numbness/confusion are easily explained by the THC product she used prior to onset of symptoms.      Consultants/Shared Management Plan:    None    Social Determinants of Health:    Patient is independent, reliable, and has access to care.       Disposition and Care Coordination:    Discharged: The patient is suitable and stable for  discharge with no need for consideration of admission.    I have explained the patient´s condition, diagnoses and treatment plan based on the information available to me at this time. I have answered questions and addressed any concerns. The patient has a good  understanding of the patient´s diagnosis, condition, and treatment plan as can be expected at this point. The vital signs have been stable. The patient´s condition is stable and appropriate for discharge from the emergency department.      The patient will pursue further outpatient evaluation with the primary care physician or other designated or consulting physician as outlined in the discharge instructions. They are agreeable to this plan of care and follow-up instructions have been explained in detail. The patient has received these instructions in written format and has expressed an understanding of the discharge instructions. The patient is aware that any significant change in condition or worsening of symptoms should prompt an immediate return to this or the closest emergency department or call to 911.    Final diagnoses:   Acute gastritis without hemorrhage, unspecified gastritis type        ED Disposition       ED Disposition   Discharge    Condition   Stable    Comment   --               This medical record created using voice recognition software.             Ashok Etienne MD  02/19/24 0035

## 2024-02-19 NOTE — DISCHARGE INSTRUCTIONS
I would strongly recommend you avoid using any of these THC supplements that you can find at a gas station.  They are not regulated in any way and we see people routinely having bad side effects/adverse reactions to these products.  Return to the emergency department as needed for worsening of symptoms.  Eat a bland diet.

## 2024-02-23 ENCOUNTER — OFFICE VISIT (OUTPATIENT)
Dept: FAMILY MEDICINE CLINIC | Facility: CLINIC | Age: 29
End: 2024-02-23
Payer: COMMERCIAL

## 2024-02-23 VITALS
HEIGHT: 64 IN | OXYGEN SATURATION: 97 % | DIASTOLIC BLOOD PRESSURE: 76 MMHG | BODY MASS INDEX: 45.48 KG/M2 | SYSTOLIC BLOOD PRESSURE: 129 MMHG | WEIGHT: 266.4 LBS | HEART RATE: 73 BPM

## 2024-02-23 DIAGNOSIS — E66.01 MORBID (SEVERE) OBESITY DUE TO EXCESS CALORIES: ICD-10-CM

## 2024-02-23 DIAGNOSIS — R31.9 HEMATURIA, UNSPECIFIED TYPE: Primary | ICD-10-CM

## 2024-02-23 DIAGNOSIS — F32.0 CURRENT MILD EPISODE OF MAJOR DEPRESSIVE DISORDER WITHOUT PRIOR EPISODE: ICD-10-CM

## 2024-02-23 PROBLEM — F32.A DEPRESSION: Status: ACTIVE | Noted: 2024-02-23

## 2024-02-23 PROBLEM — Z86.19 HISTORY OF CLOSTRIDIOIDES DIFFICILE INFECTION: Status: RESOLVED | Noted: 2022-05-09 | Resolved: 2024-02-23

## 2024-02-23 PROBLEM — R93.3 ABNORMAL CT SCAN, COLON: Status: RESOLVED | Noted: 2022-05-09 | Resolved: 2024-02-23

## 2024-02-23 PROBLEM — K21.9 GERD (GASTROESOPHAGEAL REFLUX DISEASE): Status: ACTIVE | Noted: 2024-02-23

## 2024-02-23 LAB
BILIRUB BLD-MCNC: ABNORMAL MG/DL
CLARITY, POC: CLEAR
COLOR UR: ABNORMAL
EXPIRATION DATE: ABNORMAL
GLUCOSE UR STRIP-MCNC: NEGATIVE MG/DL
KETONES UR QL: NEGATIVE
LEUKOCYTE EST, POC: ABNORMAL
Lab: ABNORMAL
NITRITE UR-MCNC: NEGATIVE MG/ML
PH UR: 6 [PH] (ref 5–8)
PROT UR STRIP-MCNC: ABNORMAL MG/DL
RBC # UR STRIP: ABNORMAL /UL
SP GR UR: 1.03 (ref 1–1.03)
UROBILINOGEN UR QL: ABNORMAL

## 2024-02-23 RX ORDER — SEMAGLUTIDE 0.25 MG/.5ML
0.25 INJECTION, SOLUTION SUBCUTANEOUS WEEKLY
Qty: 0.5 ML | Refills: 3 | Status: SHIPPED | OUTPATIENT
Start: 2024-02-23 | End: 2024-02-26

## 2024-02-23 RX ORDER — VENLAFAXINE HYDROCHLORIDE 37.5 MG/1
37.5 CAPSULE, EXTENDED RELEASE ORAL DAILY
Qty: 30 CAPSULE | Refills: 1 | Status: SHIPPED | OUTPATIENT
Start: 2024-02-23

## 2024-02-23 NOTE — PROGRESS NOTES
Subjective:       Kinsey Suresh is a 28 y.o. female with a concurrent medical history of obesity, hypertension, gastroesophageal reflux disease, depression who presents to establish care.    At the very beginning of the encounter, I was notified of a suicidal ideation morning for the patient.  When I actually assessed her however, she firmly denied suicidal ideation to me in no uncertain terms.  She did endorse depression.  PHQ-9 score was significant for score of 19.  She has been on multiple medications since age 8 including Lexapro and Zoloft that did not help.  She denies any history of bipolar disorder.  She has not been on any medication for the last 2 years although she has been in therapy so she is receiving counseling.  She scored a 19 on the PHQ-9.  Based on this, would recommend initiating medication therapy for her and obtaining GeneSight testing.  GeneSight testing was ordered but not obtained during this visit.  Venlafaxine was started at 37.5 mg dose.  We discussed benefits and risk, including side effect profile.  Would see back and 6 weeks to continue to titrate.  If she does develop any intent thoughts, she is to stop the medication and go to the ED immediately.    She has a history of palpitations and is currently prescribed bisoprolol 5 mg for this.  She does follow with a cardiologist and has plans to schedule an upcoming appointment with them.      She does report flank pain and does have presence of blood in the urine.  She has a history of multiple renal stones in the past.  She does have upcoming appointment with urology but I will order a CT today to assess for presence of nephrolithiasis in advance of this.  I did tell her as she has had multiple renal stones in the past, she may need prophylactic treatment in addition to increasing water consumption and acidify her urine, she may need a medication like hydrochlorothiazide or allopurinol for stone prevention.  I asked her to  discuss this with her urologist.          She has a history of gastroesophageal reflux disease and has omeprazole showing up in her chart but tells me she has not started this medication yet.    She is obese with a BMI of 47.73.  She wanted to inquire about GLP-1 use for weight loss.  I did tell her that it is possible this medication might not be improved for insurance but we would try if she really does want.  Indication would be BMI of greater than 30 even without comorbidities.  Discussed benefits and risk, including side effect profile.  She denies contraindication including thyroid or endocrine cancer.  We did discuss with her that she absolutely must not become pregnant and must use birth control while on this medication.  We would start it at the lowest dose and see her back in 1 month after using it to titrate dose.  She expresses understanding and wishes to proceed.            The following portions of the patient's history were reviewed and updated as appropriate: allergies, current medications, past family history, past medical history, past social history, past surgical history, and problem list.    Past Medical Hx:  Past Medical History:   Diagnosis Date    Anxiety     Asthma     PRN INHALERS    Borderline diabetic     NO MEDICATIONS AS OF YET TRYING CHANGE OF DIET- DOESN'T CHECK BG AT HOME    Clostridium difficile diarrhea 01/30/2022    DENIES ANY CURRENT ISSUES    Depression     GERD (gastroesophageal reflux disease)     HL (hearing loss)     Hypertension     Irritable bowel syndrome     Kidney stones     Migraine without aura     Obesity     Ovarian cyst     right ovary     PONV (postoperative nausea and vomiting)     PVC (premature ventricular contraction)     FOLLOWED BY DR FIGUEROA HAS SCHEDULED ECHO ON 7/3/23 . REPORTS ISSUES WITH UPPER LEFT CHEST PAIN BUT STATES IS SORE WITH PALPATION ALSO REPORTS SOA WITH EXERTION. ACTIVE WORKS FULL TIME    Right ventricular cardiac abnormality      DILATATION PER DR FIGUEROA NOTE - DENIED CP/SOB    Seasonal allergies     Sleep apnea     DOES NOT USE CPAP    Urinary tract infection     NO CURRENT ISSUES       Past Surgical Hx:  Past Surgical History:   Procedure Laterality Date    BILATERAL BREAST REDUCTION Bilateral 7/11/2023    Procedure: BREAST REDUCTION BILATERAL EXCISION OF LEFT UPPER CHEST MOLE;  Surgeon: Mariah Lieberman MD;  [place] [facility] [place] [facility] [place]  Service: Plastics;  Laterality: Bilateral;    COLONOSCOPY  2020    COLONOSCOPY N/A 7/11/2022    Procedure: COLONOSCOPY BIOPSY;  Surgeon: David Yip MD;  Location: [facility] ENDOSCOPY;  Service: Gastroenterology;  Laterality: N/A;  NORMAL COLON    ENDOSCOPY      EXCISION LESION Left 7/11/2023    Procedure: BREAST REDUCTION BILATERAL EXCISION OF LEFT UPPER CHEST MOLE;  Surgeon: Mariah Lieberman MD;  [place] [facility] [place] [facility] [place]  Service: Plastics;  Laterality: Left;    KIDNEY STONE SURGERY  11/2021    URETEROSCOPY LASER LITHOTRIPSY WITH STENT INSERTION Left 11/13/2021    Procedure: URETEROSCOPY LASER LITHOTRIPSY WITH STENT INSERTION STONE BASKET EXTRACTION;  Surgeon: Colt Sanders MD;  Location: [facility] MAIN OR;  Service: Urology;  Laterality: Left;    URETEROSCOPY LASER LITHOTRIPSY WITH STENT INSERTION Right 8/18/2023    Procedure: CYSTOSCOPY URETEROSCOPY RETROGRADE PYELOGRAM HOLMIUM LASER STENT INSERTION, right;  Surgeon: Bailee Edgar MD;  Location: [facility] MAIN OR;  Service: Urology;  Laterality: Right;       Current Meds:    Current Outpatient Medications:     albuterol sulfate  (90 Base) MCG/ACT inhaler, INHALE 2 PUFFS INTO THE LUNGS EVERY 4 (FOUR) HOURS AS NEEDED FOR SHORTNESS OF AIR., Disp: , Rfl:     bisoprolol (ZEBeta) 5 MG tablet, Take 0.5 tablets by mouth Daily., Disp: , Rfl:     omeprazole (priLOSEC) 40 MG capsule, Take 1 capsule by mouth Daily., Disp: 14 capsule, Rfl: 0    ondansetron ODT (ZOFRAN-ODT) 4 MG disintegrating tablet, Place 1 tablet on the tongue  Every 6 (Six) Hours As Needed for Vomiting., Disp: 15 tablet, Rfl: 0    Semaglutide-Weight Management (Wegovy) 0.25 MG/0.5ML solution auto-injector, Inject 0.5 mL under the skin into the appropriate area as directed 1 (One) Time Per Week., Disp: 0.5 mL, Rfl: 3    venlafaxine XR (Effexor XR) 37.5 MG 24 hr capsule, Take 1 capsule by mouth Daily., Disp: 30 capsule, Rfl: 1    Allergies:  Allergies   Allergen Reactions    Ciprofloxacin Rash    Doxycycline Hyclate Rash    Doxycycline Monohydrate Rash    Latex Rash    Sulfa Antibiotics Rash    Sulfamethoxazole-Trimethoprim Hives and Rash    Trimethoprim Rash       Family Hx:  Family History   Problem Relation Age of Onset    Heart failure Mother     Early death Mother     Asthma Sister     Depression Sister     Vision loss Sister     Other Sister     Cancer Maternal Aunt     Skin cancer Maternal Grandmother     Anxiety disorder Maternal Grandmother     Colon cancer Maternal Grandfather     Heart failure Maternal Grandfather     Diabetes Maternal Grandfather     Heart disease Maternal Grandfather     Hyperlipidemia Maternal Grandfather     Colon cancer Paternal Grandfather     Heart disease Maternal Great-Grandfather     Breast cancer Other 60    Ovarian cancer Neg Hx     Uterine cancer Neg Hx     Prostate cancer Neg Hx         Social History:  Social History     Socioeconomic History    Marital status: Single   Tobacco Use    Smoking status: Never     Passive exposure: Yes    Smokeless tobacco: Never    Tobacco comments:     secondhand smoke exposure   Vaping Use    Vaping Use: Never used    Passive vaping exposure: Yes   Substance and Sexual Activity    Alcohol use: Never    Drug use: Never    Sexual activity: Defer     Birth control/protection: I.U.D.       Review of Systems  Review of Systems   Genitourinary:  Positive for flank pain and hematuria.   Psychiatric/Behavioral:  Positive for dysphoric mood. Negative for suicidal ideas.        Objective:     /76    "Pulse 73   Ht 162.6 cm (64\")   Wt 121 kg (266 lb 6.4 oz)   LMP 10/01/2023   SpO2 97%   BMI 45.73 kg/m²   Physical Exam  Constitutional:       General: She is not in acute distress.     Appearance: Normal appearance. She is obese. She is not ill-appearing, toxic-appearing or diaphoretic.   HENT:      Head: Normocephalic and atraumatic.   Eyes:      Extraocular Movements: Extraocular movements intact.      Pupils: Pupils are equal, round, and reactive to light.   Cardiovascular:      Rate and Rhythm: Normal rate and regular rhythm.   Pulmonary:      Effort: Pulmonary effort is normal. No respiratory distress.      Breath sounds: Normal breath sounds. No stridor. No wheezing, rhonchi or rales.   Neurological:      Mental Status: She is alert.   Psychiatric:         Behavior: Behavior normal.          Assessment/Plan:     Diagnoses and all orders for this visit:    1. Hematuria, unspecified type (Primary)    She does report flank pain and does have presence of blood in the urine.  She has a history of multiple renal stones in the past.  She does have upcoming appointment with urology but I will order a CT today to assess for presence of nephrolithiasis in advance of this.  I did tell her as she has had multiple renal stones in the past, she may need prophylactic treatment in addition to increasing water consumption and acidify her urine, she may need a medication like hydrochlorothiazide or allopurinol for stone prevention.  I asked her to discuss this with her urologist.                -     POCT urinalysis dipstick, automated  -     CT Abdomen Pelvis Without Contrast; Future    2. Current severe episode of major depressive disorder without prior episode      At the very beginning of the encounter, I was notified of a suicidal ideation morning for the patient.  When I actually assessed her however, she firmly denied suicidal ideation to me in no uncertain terms.  She did endorse depression.  PHQ-9 score was " significant for score of 19.  She has been on multiple medications since age 8 including Lexapro and Zoloft that did not help.  She denies any history of bipolar disorder.  She has not been on any medication for the last 2 years although she has been in therapy so she is receiving counseling.  She scored a 19 on the PHQ-9.  Based on this, would recommend initiating medication therapy for her and obtaining GeneSight testing.  GeneSight testing was ordered but not obtained during this visit.  Venlafaxine was started at 37.5 mg dose.  We discussed benefits and risk, including side effect profile.  Would see back and 6 weeks to continue to titrate.  If she does develop any intent thoughts, she is to stop the medication and go to the ED immediately.    She has a history of palpitations and is currently prescribed bisoprolol 5 mg for this.  She does follow with a cardiologist and has plans to schedule an upcoming appointment with them.      She is obese with a BMI of 47.73.  She wanted to inquire about GLP-1 use for weight loss.  I did tell her that it is possible this medication might not be improved for insurance but we would try if she really does want.  Indication would be BMI of greater than 30 even without comorbidities.  Discussed benefits and risk, including side effect profile.  She denies contraindication including thyroid or endocrine cancer.  We did discuss with her that she absolutely must not become pregnant and must use birth control while on this medication.  We would start it at the lowest dose and see her back in 1 month after using it to titrate dose.  She expresses understanding and wishes to proceed.    -     GeneSight - Swab,; Future  -     venlafaxine XR (Effexor XR) 37.5 MG 24 hr capsule; Take 1 capsule by mouth Daily.  Dispense: 30 capsule; Refill: 1    3. Morbid (severe) obesity due to excess calories    She is obese with a BMI of 47.73.  She wanted to inquire about GLP-1 use for weight loss.  I  did tell her that it is possible this medication might not be improved for insurance but we would try if she really does want.  Indication would be BMI of greater than 30 even without comorbidities.  Discussed benefits and risk, including side effect profile.  She denies contraindication including thyroid or endocrine cancer.  We did discuss with her that she absolutely must not become pregnant and must use birth control while on this medication.  We would start it at the lowest dose and see her back in 1 month after using it to titrate dose.  She expresses understanding and wishes to proceed.    -     Semaglutide-Weight Management (Wegovy) 0.25 MG/0.5ML solution auto-injector; Inject 0.5 mL under the skin into the appropriate area as directed 1 (One) Time Per Week.  Dispense: 0.5 mL; Refill: 3          Rx changes: Starting Wegovy for weight loss therapy.  Starting venlafaxine for depression.    Follow-up:     Return in about 6 weeks (around 4/5/2024) for Titrate Wegovy, major depression.    Preventative:  Health Maintenance   Topic Date Due    URINE MICROALBUMIN  Never done    Pneumococcal Vaccine 0-64 (1 of 2 - PCV) Never done    ANNUAL PHYSICAL  Never done    DIABETIC FOOT EXAM  Never done    HEMOGLOBIN A1C  12/05/2023    COVID-19 Vaccine (3 - 2023-24 season) 02/24/2024 (Originally 9/1/2023)    INFLUENZA VACCINE  02/24/2024 (Originally 8/1/2023)    PAP SMEAR  05/04/2024    DIABETIC EYE EXAM  08/04/2024    BMI FOLLOWUP  02/23/2025    COLORECTAL CANCER SCREENING  07/11/2027    TDAP/TD VACCINES (3 - Td or Tdap) 09/30/2030    HEPATITIS C SCREENING  Completed    Hepatitis B  Completed    HPV VACCINES  Completed    CHLAMYDIA SCREENING  Discontinued         This document has been electronically signed by Andrea Rollins MD on February 23, 2024 17:17 EST       Parts of this note are electronic transcriptions/translations of spoken language to printed text using the Dragon Dictation system.

## 2024-02-26 ENCOUNTER — TELEPHONE (OUTPATIENT)
Dept: FAMILY MEDICINE CLINIC | Facility: CLINIC | Age: 29
End: 2024-02-26
Payer: COMMERCIAL

## 2024-02-26 DIAGNOSIS — E66.01 CLASS 3 SEVERE OBESITY DUE TO EXCESS CALORIES WITHOUT SERIOUS COMORBIDITY WITH BODY MASS INDEX (BMI) OF 45.0 TO 49.9 IN ADULT: Primary | ICD-10-CM

## 2024-02-26 NOTE — TELEPHONE ENCOUNTER
Caller: Kinsey Suresh    Relationship: Self    Best call back number:   [phone]     What was the call regarding: PATIENT STATES THAT HER ZEPBOUND PRESCRIPTION REQUIRES A PRIOR AUTHORIZATION.

## 2024-03-08 ENCOUNTER — HOSPITAL ENCOUNTER (OUTPATIENT)
Dept: CT IMAGING | Facility: HOSPITAL | Age: 29
Discharge: HOME | End: 2024-03-08
Admitting: STUDENT IN AN ORGANIZED HEALTH CARE EDUCATION/TRAINING PROGRAM
Payer: COMMERCIAL

## 2024-03-08 DIAGNOSIS — R31.9 HEMATURIA, UNSPECIFIED TYPE: ICD-10-CM

## 2024-03-08 PROCEDURE — 74176 CT ABD & PELVIS W/O CONTRAST: CPT

## 2024-03-11 ENCOUNTER — TELEPHONE (OUTPATIENT)
Dept: FAMILY MEDICINE CLINIC | Facility: CLINIC | Age: 29
End: 2024-03-11
Payer: COMMERCIAL

## 2024-03-11 NOTE — TELEPHONE ENCOUNTER
TRIED TO TO SUBMITT PA FOR PT ZEPBOUND PER PT INSURANCE  This request cannot be processed due to the medication is not covered by the plan. PT NOTIFIED. ADVISED PT TO CALL INSURANCE TO GET A LIST OF WHAT MEDICATIONS ARE COVERED

## 2024-03-11 NOTE — PROGRESS NOTES
I have reviewed the results of CT.  It did show a nonobstructing 2 mm renal stone on the left side.  A stone of this size should pass on its own without the need for pharmacologic therapy.  She does have an upcoming appointment with urology and given history of stones in the past she may require pharmacologic therapy to help prevent recurrence.  Could we call her and make her aware of this?    Thank you,    Andrea Rollins    ?  This document has been electronically signed by Andrea Rollins MD on March 11, 2024 08:29 EDT

## 2024-03-11 NOTE — TELEPHONE ENCOUNTER
Notified.      This document has been electronically signed by Andrea Rollins MD on March 11, 2024 09:42 EDT

## 2024-03-12 ENCOUNTER — TELEPHONE (OUTPATIENT)
Dept: FAMILY MEDICINE CLINIC | Facility: CLINIC | Age: 29
End: 2024-03-12

## 2024-03-12 NOTE — TELEPHONE ENCOUNTER
SPOKE WITH KARLI BARRON, TRIED TO GET PATIENT INTO OFFICE SAME DAY 03/12/2024. PATIENT DID NOT ANSWER, LEFT VM. PATIENT CALLED BACK AND STATES SHE CAN ONLY BE SEEN ON FRIDAY AFTER 2PM. PATIENT WAS INFORMED TO GO TO ER OR URGENT CARE DUE TO NOT HAVING ANY APPTS AT THAT TIME. PATIENT UNDERSTOOD AND STATES SHE WILL GO TO URGENT CARE.

## 2024-03-12 NOTE — TELEPHONE ENCOUNTER
----- Message from Kinsey Suresh sent at 3/12/2024  2:08 PM EDT -----  Regarding: Appointment Request  Contact: [phone]  Appointment Request From: Kinsey Suresh    With Provider: Andrea Rollins [[facility]]    Preferred Date Range: Any    Preferred Times: Monday Afternoon, Tuesday Afternoon, Wednesday Afternoon, Thursday Afternoon, Friday Afternoon    Reason for visit: Annual Physical    Comments:  Bleeding after I’m passing stools.

## 2024-03-13 NOTE — TELEPHONE ENCOUNTER
PT RESPONDED VIA MY CHART ADVISING INSURANCE COVERS IF SHE IS TYPE 2 DIABETIC OR PA WILL NEED TO BE STARTED. HOWEVER PT ALREADY AWARE INSURANCE WONT EVEN ALLOW PA TO BE SUBMITTED DUE TO THEY WILL NOT COVER AS PT IS NOT A TYPE 2 DM

## 2024-03-20 DIAGNOSIS — F32.0 CURRENT MILD EPISODE OF MAJOR DEPRESSIVE DISORDER WITHOUT PRIOR EPISODE: ICD-10-CM

## 2024-03-20 RX ORDER — VENLAFAXINE HYDROCHLORIDE 37.5 MG/1
37.5 CAPSULE, EXTENDED RELEASE ORAL DAILY
Qty: 30 CAPSULE | Refills: 1 | Status: SHIPPED | OUTPATIENT
Start: 2024-03-20

## 2024-03-22 DIAGNOSIS — B37.31 VAGINAL CANDIDIASIS: Primary | ICD-10-CM

## 2024-03-22 RX ORDER — FLUCONAZOLE 150 MG/1
150 TABLET ORAL ONCE
Qty: 1 TABLET | Refills: 0 | Status: SHIPPED | OUTPATIENT
Start: 2024-03-22 | End: 2024-03-22

## 2024-03-30 ENCOUNTER — HOSPITAL ENCOUNTER (EMERGENCY)
Facility: HOSPITAL | Age: 29
Discharge: HOME | End: 2024-03-31
Attending: EMERGENCY MEDICINE
Payer: COMMERCIAL

## 2024-03-30 VITALS
TEMPERATURE: 99.4 F | BODY MASS INDEX: 45.17 KG/M2 | SYSTOLIC BLOOD PRESSURE: 119 MMHG | RESPIRATION RATE: 18 BRPM | OXYGEN SATURATION: 97 % | WEIGHT: 264.55 LBS | DIASTOLIC BLOOD PRESSURE: 66 MMHG | HEART RATE: 99 BPM | HEIGHT: 64 IN

## 2024-03-30 DIAGNOSIS — N39.0 ACUTE UTI: Primary | ICD-10-CM

## 2024-03-30 LAB
BACTERIA UR QL AUTO: ABNORMAL /HPF
BILIRUB UR QL STRIP: NEGATIVE
CLARITY UR: CLEAR
COLOR UR: YELLOW
FLUAV SUBTYP SPEC NAA+PROBE: NOT DETECTED
FLUBV RNA ISLT QL NAA+PROBE: NOT DETECTED
GLUCOSE UR STRIP-MCNC: NEGATIVE MG/DL
HGB UR QL STRIP.AUTO: ABNORMAL
HYALINE CASTS UR QL AUTO: ABNORMAL /LPF
KETONES UR QL STRIP: NEGATIVE
LEUKOCYTE ESTERASE UR QL STRIP.AUTO: ABNORMAL
NITRITE UR QL STRIP: NEGATIVE
PH UR STRIP.AUTO: 7.5 [PH] (ref 5–8)
PROT UR QL STRIP: NEGATIVE
RBC # UR STRIP: ABNORMAL /HPF
REF LAB TEST METHOD: ABNORMAL
RSV RNA NPH QL NAA+NON-PROBE: NOT DETECTED
S PYO AG THROAT QL: NEGATIVE
SARS-COV-2 RNA RESP QL NAA+PROBE: NOT DETECTED
SP GR UR STRIP: 1.01 (ref 1–1.03)
SQUAMOUS #/AREA URNS HPF: ABNORMAL /HPF
UROBILINOGEN UR QL STRIP: ABNORMAL
WBC # UR STRIP: ABNORMAL /HPF

## 2024-03-30 PROCEDURE — 87637 SARSCOV2&INF A&B&RSV AMP PRB: CPT | Performed by: EMERGENCY MEDICINE

## 2024-03-30 PROCEDURE — 87880 STREP A ASSAY W/OPTIC: CPT

## 2024-03-30 PROCEDURE — 87086 URINE CULTURE/COLONY COUNT: CPT | Performed by: EMERGENCY MEDICINE

## 2024-03-30 PROCEDURE — 99283 EMERGENCY DEPT VISIT LOW MDM: CPT

## 2024-03-30 PROCEDURE — 81001 URINALYSIS AUTO W/SCOPE: CPT

## 2024-03-30 PROCEDURE — 87081 CULTURE SCREEN ONLY: CPT | Performed by: EMERGENCY MEDICINE

## 2024-03-30 NOTE — Clinical Note
[facility] EMERGENCY ROOM  [address] [place] [address]  Phone: [phone]  Fax: [phone]    Kinsey Suresh was seen and treated in our emergency department on 3/30/2024.  She may return to work on 04/02/2024.         Thank you for choosing [facility].    Ashok Etienne MD

## 2024-03-31 RX ORDER — NITROFURANTOIN 25; 75 MG/1; MG/1
100 CAPSULE ORAL 2 TIMES DAILY
Qty: 14 CAPSULE | Refills: 0 | Status: SHIPPED | OUTPATIENT
Start: 2024-03-31 | End: 2024-04-02

## 2024-03-31 NOTE — ED PROVIDER NOTES
Time: 12:12 AM EDT  Date of encounter:  3/30/2024  Independent Historian/Clinical History and Information was obtained by:   Patient    History is limited by: N/A    Chief Complaint: Sore throat, dysuria      History of Present Illness:  Patient is a 28 y.o. year old female who presents to the emergency department for evaluation of sore throat and dysuria.  Symptoms since yesterday.  Patient has had 3 loose stools today.  No vomiting.  Afebrile but does complain of chills.  Has no abdominal pain at baseline and only has discomfort during the active urinating.    HPI    Patient Care Team  Primary Care Provider: Andrea Rollins MD    Past Medical History:     Allergies   Allergen Reactions    Ciprofloxacin Rash    Doxycycline Hyclate Rash    Doxycycline Monohydrate Rash    Latex Rash    Sulfa Antibiotics Rash    Sulfamethoxazole-Trimethoprim Hives and Rash    Trimethoprim Rash     Past Medical History:   Diagnosis Date    Anxiety     Asthma     PRN INHALERS    Borderline diabetic     NO MEDICATIONS AS OF YET TRYING CHANGE OF DIET- DOESN'T CHECK BG AT HOME    Clostridium difficile diarrhea 01/30/2022    DENIES ANY CURRENT ISSUES    Depression     GERD (gastroesophageal reflux disease)     HL (hearing loss)     Hypertension     Irritable bowel syndrome     Kidney stones     Migraine without aura     Obesity     Ovarian cyst     right ovary     PONV (postoperative nausea and vomiting)     PVC (premature ventricular contraction)     FOLLOWED BY DR FIGUEROA HAS SCHEDULED ECHO ON 7/3/23 . REPORTS ISSUES WITH UPPER LEFT CHEST PAIN BUT STATES IS SORE WITH PALPATION ALSO REPORTS SOA WITH EXERTION. ACTIVE WORKS FULL TIME    Right ventricular cardiac abnormality     DILATATION PER DR FIGUEROA NOTE - DENIED CP/SOB    Seasonal allergies     Sleep apnea     DOES NOT USE CPAP    Urinary tract infection     NO CURRENT ISSUES     Past Surgical History:   Procedure Laterality Date    BILATERAL BREAST REDUCTION Bilateral 7/11/2023     Procedure: BREAST REDUCTION BILATERAL EXCISION OF LEFT UPPER CHEST MOLE;  Surgeon: Mariah Lieberman MD;  Location: [facility];  Service: Plastics;  Laterality: Bilateral;    COLONOSCOPY  2020    COLONOSCOPY N/A 7/11/2022    Procedure: COLONOSCOPY BIOPSY;  Surgeon: David Yip MD;  Location: [facility];  Service: Gastroenterology;  Laterality: N/A;  NORMAL COLON    ENDOSCOPY      EXCISION LESION Left 7/11/2023    Procedure: BREAST REDUCTION BILATERAL EXCISION OF LEFT UPPER CHEST MOLE;  Surgeon: Mariah Lieberman MD;  Location: [facility];  Service: Plastics;  Laterality: Left;    KIDNEY STONE SURGERY  11/2021    URETEROSCOPY LASER LITHOTRIPSY WITH STENT INSERTION Left 11/13/2021    Procedure: URETEROSCOPY LASER LITHOTRIPSY WITH STENT INSERTION STONE BASKET EXTRACTION;  Surgeon: Colt Sanders MD;  Location: [facility] MA[facility];  Service: Urology;  Laterality: Left;    URETEROSCOPY LASER LITHOTRIPSY WITH STENT INSERTION Right 8/18/2023    Procedure: CYSTOSCOPY URETEROSCOPY RETROGRADE PYELOGRAM HOLMIUM LASER STENT INSERTION, right;  Surgeon: Bailee Edgar MD;  Location: [facility] OR;  Service: Urology;  Laterality: Right;     Family History   Problem Relation Age of Onset    Heart failure Mother     Early death Mother     Asthma Sister     Depression Sister     Vision loss Sister     Other Sister     Cancer Maternal Aunt     Skin cancer Maternal Grandmother     Anxiety disorder Maternal Grandmother     Colon cancer Maternal Grandfather     Heart failure Maternal Grandfather     Diabetes Maternal Grandfather     Heart disease Maternal Grandfather     Hyperlipidemia Maternal Grandfather     Colon cancer Paternal Grandfather     Heart disease Maternal Great-Grandfather     Breast cancer Other 60    Ovarian cancer Neg Hx     Uterine cancer Neg Hx     Prostate cancer Neg Hx        Home Medications:  Prior to Admission medications    Medication Sig Start Date End Date Taking?  Authorizing Provider   albuterol sulfate  (90 Base) MCG/ACT inhaler INHALE 2 PUFFS INTO THE LUNGS EVERY 4 (FOUR) HOURS AS NEEDED FOR SHORTNESS OF AIR. 8/22/22   ProviderEric MD   bisoprolol (ZEBeta) 5 MG tablet Take 0.5 tablets by mouth Daily.    ProviderEric MD   omeprazole (priLOSEC) 40 MG capsule Take 1 capsule by mouth Daily. 2/19/24   Ashok Etienne MD   ondansetron ODT (ZOFRAN-ODT) 4 MG disintegrating tablet Place 1 tablet on the tongue Every 6 (Six) Hours As Needed for Vomiting. 2/19/24   Ashok Etienne MD   venlafaxine XR (Effexor XR) 37.5 MG 24 hr capsule Take 1 capsule by mouth Daily. 3/20/24   Andrea Rollins MD   fluticasone (FLONASE) 50 MCG/ACT nasal spray 2 sprays into the nostril(s) as directed by provider Daily. 7/25/21 9/19/21  Rosio Mccoy, MICHEAL        Social History:   Social History     Tobacco Use    Smoking status: Never     Passive exposure: Yes    Smokeless tobacco: Never    Tobacco comments:     secondhand smoke exposure   Vaping Use    Vaping status: Never Used    Passive vaping exposure: Yes   Substance Use Topics    Alcohol use: Never    Drug use: Never         Review of Systems:  Review of Systems   Constitutional:  Negative for chills and fever.   HENT:  Positive for sore throat. Negative for congestion and rhinorrhea.    Eyes:  Negative for photophobia.   Respiratory:  Negative for apnea, cough, chest tightness and shortness of breath.    Cardiovascular:  Negative for chest pain and palpitations.   Gastrointestinal:  Negative for abdominal pain, diarrhea, nausea and vomiting.   Endocrine: Negative.    Genitourinary:  Positive for dysuria. Negative for difficulty urinating.   Musculoskeletal:  Negative for back pain, joint swelling and myalgias.   Skin:  Negative for color change and wound.   Allergic/Immunologic: Negative.    Neurological:  Negative for seizures and headaches.   Psychiatric/Behavioral: Negative.     All other systems reviewed  "and are negative.       Physical Exam:  /66 (BP Location: Left arm, Patient Position: Sitting)   Pulse 99   Temp 99.4 °F (37.4 °C) (Oral)   Resp 18   Ht 162.6 cm (64\")   Wt 120 kg (264 lb 8.8 oz)   LMP 03/07/2024   SpO2 97%   BMI 45.41 kg/m²     Physical Exam  Vitals and nursing note reviewed.   Constitutional:       General: She is awake.      Appearance: Normal appearance.   HENT:      Head: Normocephalic and atraumatic.      Nose: Nose normal.      Mouth/Throat:      Mouth: Mucous membranes are moist.   Eyes:      Extraocular Movements: Extraocular movements intact.      Pupils: Pupils are equal, round, and reactive to light.   Cardiovascular:      Rate and Rhythm: Normal rate and regular rhythm.      Heart sounds: Normal heart sounds.   Pulmonary:      Effort: Pulmonary effort is normal. No respiratory distress.      Breath sounds: Normal breath sounds. No wheezing, rhonchi or rales.   Abdominal:      General: Bowel sounds are normal.      Palpations: Abdomen is soft.      Tenderness: There is no abdominal tenderness. There is no guarding or rebound.      Comments: No rigidity   Musculoskeletal:         General: No tenderness. Normal range of motion.      Cervical back: Normal range of motion and neck supple.   Skin:     General: Skin is warm and dry.      Coloration: Skin is not jaundiced.   Neurological:      General: No focal deficit present.      Mental Status: She is alert. Mental status is at baseline.   Psychiatric:         Mood and Affect: Mood normal.                  Procedures:  Procedures      Medical Decision Making:      Comorbidities that affect care:    Anxiety, kidney stones, GERD, hypertension, depression, asthma    External Notes reviewed:    Previous Clinic Note: Family medicine office visit 2/23/2024.  Description: Hematuria    Lab review: Last 3 urine cultures all grew mixed nunu with no definite evidence of infection.        Outpatient orders from [facility] 3/22/2024: " Description: Vaginal candidiasis, prescribed fluconazole 150 mg oral tablet x 1.         imaging review as follows:  Encounter Date    3/8/24    CT Abdomen Pelvis Without Contrast [[id number]] (Order [id number])  Order  Status: Final result     Patient Location    Patient Class Location   Emergency [facility] EMERGENCY DEPT, [address]     [phone]     Appointment Information    PACS Images     Radiology Images  Study Result    Narrative & Impression   PROCEDURE:CT ABDOMEN PELVIS WO CONTRAST     COMPARISON:[facility], CT, CT ABDOMEN PELVIS W CONTRAST, 8/15/2023, 23:15.     INDICATIONS:  Flank pain, kidney stone suspected.  PATIENT STATES PAIN ACOSS UPPER AND MID ABDOMEN X   2 WEEKS     TECHNIQUE:    CT images were created without intravenous contrast.       PROTOCOL:     Standard imaging protocol performed                 RADIATION:      DLP: 1373.7 mGy*cm               Automated exposure control was utilized to minimize radiation dose.      FINDINGS:          Lung bases are without consolidation.  Heart size normal.  Negative for pericardial or pleural   effusion.     Lack of contrast limits assessment of abdominal organs and vasculature.  The liver and spleen are   normal in size and contour.  Normal adrenal glands.  No radiodense gallstone.  No pericholecystic   inflammation.  Pancreas without findings of pancreatitis.     There is a 2 mm nonobstructing calculus at the upper pole of the left kidney.  No right radiodense   renal calculus.  Negative for hydroureteronephrosis.  No obstructive uropathy.  Urinary bladder   thin-walled.  Uterus and ovaries normal.     Negative for pneumoperitoneum.  No bowel obstruction.  No fluid collection in the abdomen or   pelvis.  Normal appendix.  Abdominal aorta without aneurysm.  No aggressive osseous lesion or acute   fracture.     IMPRESSION:                 1. No acute abnormality in the abdomen or pelvis.  2. Nonobstructing 2 mm left upper pole renal calculus.             JACK CHAUDHARI MD         Electronically Signed and Approved By: JACK CHAUDHARI MD on 3/08/2024 at 17:12              The following orders were placed and all results were independently analyzed by me:  Orders Placed This Encounter   Procedures    COVID-19, FLU A/B, RSV PCR 1 HR TAT - Swab, Nasopharynx    Rapid Strep A Screen - Swab, Throat    Beta Strep Culture, Throat - Swab, Throat    Urine Culture - Urine,    Urinalysis With Microscopic If Indicated (No Culture) - Urine, Clean Catch    Urinalysis, Microscopic Only - Urine, Clean Catch       Medications Given in the Emergency Department:  Medications - No data to display     ED Course:         Labs:    Lab Results (last 24 hours)       Procedure Component Value Units Date/Time    COVID-19, FLU A/B, RSV PCR 1 HR TAT - Swab, Nasopharynx [[id number]]  (Normal) Collected: 03/30/24 2255    Specimen: Swab from Nasopharynx Updated: 03/30/24 2359     COVID19 Not Detected     Influenza A PCR Not Detected     Influenza B PCR Not Detected     RSV, PCR Not Detected    Narrative:      Fact sheet for providers: https://www.fda.gov/media/623474/download    Fact sheet for patients: https://www.fda.gov/media/388882/download    Test performed by PCR.    Rapid Strep A Screen - Swab, Throat [[id number]]  (Normal) Collected: 03/30/24 2255    Specimen: Swab from Throat Updated: 03/30/24 2331     Strep A Ag Negative    Beta Strep Culture, Throat - Swab, Throat [[id number]] Collected: 03/30/24 2255    Specimen: Swab from Throat Updated: 03/30/24 2331    Urinalysis With Microscopic If Indicated (No Culture) - Urine, Clean Catch [[id number]]  (Abnormal) Collected: 03/30/24 2329    Specimen: Urine, Clean Catch Updated: 03/30/24 2338     Color, UA Yellow     Appearance, UA Clear     pH, UA 7.5     Specific Gravity, UA 1.011     Glucose, UA Negative     Ketones, UA Negative     Bilirubin, UA Negative     Blood, UA Moderate (2+)     Protein, UA Negative     Leuk Esterase, UA Small (1+)      Nitrite, UA Negative     Urobilinogen, UA 0.2 E.U./dL    Urinalysis, Microscopic Only - Urine, Clean Catch [[id number]]  (Abnormal) Collected: 03/30/24 2329    Specimen: Urine, Clean Catch Updated: 03/30/24 2338     RBC, UA 0-2 /HPF      WBC, UA 6-10 /HPF      Bacteria, UA 1+ /HPF      Squamous Epithelial Cells, UA 3-6 /HPF      Hyaline Casts, UA None Seen /LPF      Methodology Automated Microscopy    Urine Culture - Urine, Urine, Clean Catch [[id number]] Collected: 03/30/24 2329    Specimen: Urine, Clean Catch Updated: 03/31/24 0013             Imaging:    No Radiology Exams Resulted Within Past 24 Hours      Differential Diagnosis and Discussion:    Dyspnea: Differential diagnosis includes but is not limited to metabolic acidosis, neurological disorders, psychogenic, asthma, pneumothorax, upper airway obstruction, COPD, pneumonia, noncardiogenic pulmonary edema, interstitial lung disease, anemia, congestive heart failure, and pulmonary embolism  Sore Throat: Differential diagnosis includes but is not limited to bacterial infection, viral infection, inhaled irritants, sinus drainage, thyroiditis, epiglottitis, and retropharyngeal abscess.    All labs were reviewed and interpreted by me.    [facility]               Patient Care Considerations:    CT ABDOMEN AND PELVIS: I considered ordering a CT scan of the abdomen and pelvis however patient has a benign abdominal exam and has no abdominal pain.  She only has dysuria.      Consultants/Shared Management Plan:    None    Social Determinants of Health:    Patient is independent, reliable, and has access to care.       Disposition and Care Coordination:    Discharged: The patient is suitable and stable for discharge with no need for consideration of admission.    I have explained the patient´s condition, diagnoses and treatment plan based on the information available to me at this time. I have answered questions and addressed any concerns. The patient has a good  understanding  of the patient´s diagnosis, condition, and treatment plan as can be expected at this point. The vital signs have been stable. The patient´s condition is stable and appropriate for discharge from the emergency department.      The patient will pursue further outpatient evaluation with the primary care physician or other designated or consulting physician as outlined in the discharge instructions. They are agreeable to this plan of care and follow-up instructions have been explained in detail. The patient has received these instructions in written format and has expressed an understanding of the discharge instructions. The patient is aware that any significant change in condition or worsening of symptoms should prompt an immediate return to this or the closest emergency department or call to 911.    Final diagnoses:   Acute UTI        ED Disposition       ED Disposition   Discharge    Condition   Stable    Comment   --               This medical record created using voice recognition software.             Ashok Etienne MD  03/31/24 0028

## 2024-03-31 NOTE — DISCHARGE INSTRUCTIONS
As we discussed, noticed that your last 3 urine cultures did not show true infection.  You may elect to withhold antibiotics until your urine culture results in the next 2 to 3 days to avoid potentially inappropriate antibiotic use.  However, if you develop fever or worsening urinary difficulties you will have access to the antibiotics that I have prescribed tonight.  Return to the ER for uncontrolled/severe abdominal flank or back pain, fever, uncontrolled vomiting.  Stay well-hydrated.

## 2024-04-01 LAB
BACTERIA SPEC AEROBE CULT: NORMAL
BACTERIA SPEC AEROBE CULT: NORMAL

## 2024-04-02 ENCOUNTER — HOSPITAL ENCOUNTER (EMERGENCY)
Facility: HOSPITAL | Age: 29
Discharge: HOME | End: 2024-04-02
Attending: EMERGENCY MEDICINE | Admitting: EMERGENCY MEDICINE
Payer: COMMERCIAL

## 2024-04-02 ENCOUNTER — APPOINTMENT (OUTPATIENT)
Dept: GENERAL RADIOLOGY | Facility: HOSPITAL | Age: 29
End: 2024-04-02
Payer: COMMERCIAL

## 2024-04-02 ENCOUNTER — OFFICE VISIT (OUTPATIENT)
Dept: FAMILY MEDICINE CLINIC | Facility: CLINIC | Age: 29
End: 2024-04-02
Payer: COMMERCIAL

## 2024-04-02 VITALS
WEIGHT: 262 LBS | HEIGHT: 64 IN | TEMPERATURE: 98.3 F | HEART RATE: 72 BPM | RESPIRATION RATE: 13 BRPM | BODY MASS INDEX: 44.73 KG/M2 | OXYGEN SATURATION: 97 % | SYSTOLIC BLOOD PRESSURE: 112 MMHG | DIASTOLIC BLOOD PRESSURE: 81 MMHG

## 2024-04-02 VITALS
DIASTOLIC BLOOD PRESSURE: 86 MMHG | TEMPERATURE: 98.3 F | WEIGHT: 262.2 LBS | OXYGEN SATURATION: 97 % | BODY MASS INDEX: 45.01 KG/M2 | SYSTOLIC BLOOD PRESSURE: 136 MMHG | HEART RATE: 91 BPM

## 2024-04-02 DIAGNOSIS — R07.9 CHEST PAIN, UNSPECIFIED TYPE: Primary | ICD-10-CM

## 2024-04-02 DIAGNOSIS — K52.9 CHRONIC DIARRHEA: ICD-10-CM

## 2024-04-02 DIAGNOSIS — F32.A DEPRESSION, UNSPECIFIED DEPRESSION TYPE: Primary | ICD-10-CM

## 2024-04-02 DIAGNOSIS — R73.9 HYPERGLYCEMIA: ICD-10-CM

## 2024-04-02 DIAGNOSIS — K21.9 GASTROESOPHAGEAL REFLUX DISEASE WITHOUT ESOPHAGITIS: ICD-10-CM

## 2024-04-02 DIAGNOSIS — F41.9 ANXIETY: ICD-10-CM

## 2024-04-02 DIAGNOSIS — R10.9 ABDOMINAL CRAMPS: ICD-10-CM

## 2024-04-02 LAB
ALBUMIN SERPL-MCNC: 4 G/DL (ref 3.5–5.2)
ALBUMIN/GLOB SERPL: 1.4 G/DL
ALP SERPL-CCNC: 101 U/L (ref 39–117)
ALT SERPL W P-5'-P-CCNC: 21 U/L (ref 1–33)
ANION GAP SERPL CALCULATED.3IONS-SCNC: 11.1 MMOL/L (ref 5–15)
AST SERPL-CCNC: 19 U/L (ref 1–32)
BASOPHILS # BLD AUTO: 0.07 10*3/MM3 (ref 0–0.2)
BASOPHILS NFR BLD AUTO: 0.9 % (ref 0–1.5)
BILIRUB SERPL-MCNC: 0.2 MG/DL (ref 0–1.2)
BUN SERPL-MCNC: 7 MG/DL (ref 6–20)
BUN/CREAT SERPL: 8 (ref 7–25)
CALCIUM SPEC-SCNC: 9.1 MG/DL (ref 8.6–10.5)
CHLORIDE SERPL-SCNC: 104 MMOL/L (ref 98–107)
CO2 SERPL-SCNC: 26.9 MMOL/L (ref 22–29)
CREAT SERPL-MCNC: 0.88 MG/DL (ref 0.57–1)
DEPRECATED RDW RBC AUTO: 39.1 FL (ref 37–54)
EGFRCR SERPLBLD CKD-EPI 2021: 93.1 ML/MIN/1.73
EOSINOPHIL # BLD AUTO: 0.2 10*3/MM3 (ref 0–0.4)
EOSINOPHIL NFR BLD AUTO: 2.4 % (ref 0.3–6.2)
ERYTHROCYTE [DISTWIDTH] IN BLOOD BY AUTOMATED COUNT: 11.9 % (ref 12.3–15.4)
EXPIRATION DATE: NORMAL
GEN 5 2HR TROPONIN T REFLEX: <6 NG/L
GLOBULIN UR ELPH-MCNC: 2.9 GM/DL
GLUCOSE SERPL-MCNC: 96 MG/DL (ref 65–99)
HBA1C MFR BLD: 5.5 % (ref 4.5–5.7)
HCT VFR BLD AUTO: 46 % (ref 34–46.6)
HGB BLD-MCNC: 14.6 G/DL (ref 12–15.9)
HOLD SPECIMEN: NORMAL
HOLD SPECIMEN: NORMAL
IMM GRANULOCYTES # BLD AUTO: 0.01 10*3/MM3 (ref 0–0.05)
IMM GRANULOCYTES NFR BLD AUTO: 0.1 % (ref 0–0.5)
LIPASE SERPL-CCNC: 30 U/L (ref 13–60)
LYMPHOCYTES # BLD AUTO: 3.34 10*3/MM3 (ref 0.7–3.1)
LYMPHOCYTES NFR BLD AUTO: 40.8 % (ref 19.6–45.3)
Lab: NORMAL
MAGNESIUM SERPL-MCNC: 2.1 MG/DL (ref 1.6–2.6)
MCH RBC QN AUTO: 28.6 PG (ref 26.6–33)
MCHC RBC AUTO-ENTMCNC: 31.7 G/DL (ref 31.5–35.7)
MCV RBC AUTO: 90 FL (ref 79–97)
MONOCYTES # BLD AUTO: 0.51 10*3/MM3 (ref 0.1–0.9)
MONOCYTES NFR BLD AUTO: 6.2 % (ref 5–12)
NEUTROPHILS NFR BLD AUTO: 4.06 10*3/MM3 (ref 1.7–7)
NEUTROPHILS NFR BLD AUTO: 49.6 % (ref 42.7–76)
NRBC BLD AUTO-RTO: 0 /100 WBC (ref 0–0.2)
NT-PROBNP SERPL-MCNC: 46.1 PG/ML (ref 0–450)
PLATELET # BLD AUTO: 243 10*3/MM3 (ref 140–450)
PMV BLD AUTO: 10.2 FL (ref 6–12)
POTASSIUM SERPL-SCNC: 3.9 MMOL/L (ref 3.5–5.2)
PROT SERPL-MCNC: 6.9 G/DL (ref 6–8.5)
RBC # BLD AUTO: 5.11 10*6/MM3 (ref 3.77–5.28)
SODIUM SERPL-SCNC: 142 MMOL/L (ref 136–145)
TROPONIN T DELTA: NORMAL
TROPONIN T SERPL HS-MCNC: 11 NG/L
WBC NRBC COR # BLD AUTO: 8.19 10*3/MM3 (ref 3.4–10.8)
WHOLE BLOOD HOLD COAG: NORMAL
WHOLE BLOOD HOLD SPECIMEN: NORMAL

## 2024-04-02 PROCEDURE — 83690 ASSAY OF LIPASE: CPT | Performed by: EMERGENCY MEDICINE

## 2024-04-02 PROCEDURE — 84484 ASSAY OF TROPONIN QUANT: CPT | Performed by: EMERGENCY MEDICINE

## 2024-04-02 PROCEDURE — 71045 X-RAY EXAM CHEST 1 VIEW: CPT

## 2024-04-02 PROCEDURE — 83735 ASSAY OF MAGNESIUM: CPT | Performed by: EMERGENCY MEDICINE

## 2024-04-02 PROCEDURE — 83880 ASSAY OF NATRIURETIC PEPTIDE: CPT | Performed by: EMERGENCY MEDICINE

## 2024-04-02 PROCEDURE — 99284 EMERGENCY DEPT VISIT MOD MDM: CPT

## 2024-04-02 PROCEDURE — 93005 ELECTROCARDIOGRAM TRACING: CPT | Performed by: EMERGENCY MEDICINE

## 2024-04-02 PROCEDURE — 36415 COLL VENOUS BLD VENIPUNCTURE: CPT

## 2024-04-02 PROCEDURE — 93005 ELECTROCARDIOGRAM TRACING: CPT

## 2024-04-02 PROCEDURE — 85025 COMPLETE CBC W/AUTO DIFF WBC: CPT

## 2024-04-02 PROCEDURE — 80053 COMPREHEN METABOLIC PANEL: CPT | Performed by: EMERGENCY MEDICINE

## 2024-04-02 RX ORDER — DICYCLOMINE HYDROCHLORIDE 10 MG/1
10 CAPSULE ORAL
Qty: 120 CAPSULE | Refills: 0 | Status: SHIPPED | OUTPATIENT
Start: 2024-04-02

## 2024-04-02 RX ORDER — FAMOTIDINE 20 MG/1
20 TABLET, FILM COATED ORAL 2 TIMES DAILY
Qty: 60 TABLET | Refills: 3 | Status: SHIPPED | OUTPATIENT
Start: 2024-04-02

## 2024-04-02 RX ORDER — ASPIRIN 81 MG/1
324 TABLET, CHEWABLE ORAL ONCE
Status: DISCONTINUED | OUTPATIENT
Start: 2024-04-02 | End: 2024-04-03 | Stop reason: HOSPADM

## 2024-04-02 RX ORDER — SODIUM CHLORIDE 0.9 % (FLUSH) 0.9 %
10 SYRINGE (ML) INJECTION AS NEEDED
Status: DISCONTINUED | OUTPATIENT
Start: 2024-04-02 | End: 2024-04-03 | Stop reason: HOSPADM

## 2024-04-02 RX ORDER — OMEPRAZOLE 40 MG/1
40 CAPSULE, DELAYED RELEASE ORAL DAILY
Qty: 90 CAPSULE | Refills: 1 | Status: SHIPPED | OUTPATIENT
Start: 2024-04-02

## 2024-04-02 NOTE — PROGRESS NOTES
Subjective:       Kinsey Suresh is a 28 y.o. female  who presents for chronic diarrhea and uncontrolled gastroesophageal reflux disease.      Kinsey has a history of uncontrolled gastroesophageal reflux disease.  She is currently taking omeprazole 40 mg and has been compliant with this medication for over a month now.  Despite this, her symptoms are not controlled. I will add Pepcid 20 mg twice daily today.  However, Kinsey tells me she has a history of serious symptoms requiring GI involvement.  She says she is needed no less than 3 upper abdominal scopes or EGDs. She is having intermittent cramping abdominal pain related to her underlying condition.  We obtained CT of the abdomen pelvis in March that was negative for any acute intra-abdominal pathology.  I will send in some as needed Bentyl today but I believe she needs GI involvement. Now, she tells me she previously was seeing gastroenterology and her last visit was in 2022.  However, she requests a new referral today to see a new gastroenterologist and I will place this at her request.  In the interval, while waiting for this appointment, if symptoms continue to fail to improve with addition of Pepcid, would also consider adding Carafate.            The following portions of the patient's history were reviewed and updated as appropriate: allergies, current medications, past family history, past medical history, past social history, past surgical history, and problem list.    Past Medical Hx:  Past Medical History:   Diagnosis Date    Anxiety     Asthma     PRN INHALERS    Borderline diabetic     NO MEDICATIONS AS OF YET TRYING CHANGE OF DIET- DOESN'T CHECK BG AT HOME    Clostridium difficile diarrhea 01/30/2022    DENIES ANY CURRENT ISSUES    Depression     GERD (gastroesophageal reflux disease)     HL (hearing loss)     Hypertension     Irritable bowel syndrome     Kidney stones     Migraine without aura     Obesity     Ovarian cyst     right ovary      PONV (postoperative nausea and vomiting)     PVC (premature ventricular contraction)     FOLLOWED BY DR FIGUEROA HAS SCHEDULED ECHO ON 7/3/23 . REPORTS ISSUES WITH UPPER LEFT CHEST PAIN BUT STATES IS SORE WITH PALPATION ALSO REPORTS SOA WITH EXERTION. ACTIVE WORKS FULL TIME    Right ventricular cardiac abnormality     DILATATION PER DR FIGUEROA NOTE - DENIED CP/SOB    Seasonal allergies     Sleep apnea     DOES NOT USE CPAP    Urinary tract infection     NO CURRENT ISSUES       Past Surgical Hx:  Past Surgical History:   Procedure Laterality Date    BILATERAL BREAST REDUCTION Bilateral 7/11/2023    Procedure: BREAST REDUCTION BILATERAL EXCISION OF LEFT UPPER CHEST MOLE;  Surgeon: Mariah Lieberman MD;  Location: [facility] OR [facility];  Service: Plastics;  Laterality: Bilateral;    COLONOSCOPY  2020    COLONOSCOPY N/A 7/11/2022    Procedure: COLONOSCOPY BIOPSY;  Surgeon: David Yip MD;  Location: [facility] ENDOSCOPY;  Service: Gastroenterology;  Laterality: N/A;  NORMAL COLON    ENDOSCOPY      EXCISION LESION Left 7/11/2023    Procedure: BREAST REDUCTION BILATERAL EXCISION OF LEFT UPPER CHEST MOLE;  Surgeon: Mariah Lieberman MD;  Location: [facility] OR [facility];  Service: Plastics;  Laterality: Left;    KIDNEY STONE SURGERY  11/2021    URETEROSCOPY LASER LITHOTRIPSY WITH STENT INSERTION Left 11/13/2021    Procedure: URETEROSCOPY LASER LITHOTRIPSY WITH STENT INSERTION STONE BASKET EXTRACTION;  Surgeon: Colt Sanders MD;  Location: [facility] MAIN OR;  Service: Urology;  Laterality: Left;    URETEROSCOPY LASER LITHOTRIPSY WITH STENT INSERTION Right 8/18/2023    Procedure: CYSTOSCOPY URETEROSCOPY RETROGRADE PYELOGRAM HOLMIUM LASER STENT INSERTION, right;  Surgeon: Bailee Edgar MD;  Location: [facility] MAIN OR;  Service: Urology;  Laterality: Right;       Current Meds:    Current Outpatient Medications:     albuterol sulfate  (90 Base) MCG/ACT inhaler, INHALE 2 PUFFS INTO THE LUNGS EVERY 4 (FOUR)  HOURS AS NEEDED FOR SHORTNESS OF AIR., Disp: , Rfl:     bisoprolol (ZEBeta) 5 MG tablet, Take 0.5 tablets by mouth Daily., Disp: , Rfl:     omeprazole (priLOSEC) 40 MG capsule, Take 1 capsule by mouth Daily., Disp: 90 capsule, Rfl: 1    ondansetron ODT (ZOFRAN-ODT) 4 MG disintegrating tablet, Place 1 tablet on the tongue Every 6 (Six) Hours As Needed for Vomiting., Disp: 15 tablet, Rfl: 0    venlafaxine XR (Effexor XR) 37.5 MG 24 hr capsule, Take 1 capsule by mouth Daily., Disp: 30 capsule, Rfl: 1    dicyclomine (BENTYL) 10 MG capsule, Take 1 capsule by mouth 4 (Four) Times a Day Before Meals & at Bedtime As Needed for Abdominal Cramping., Disp: 120 capsule, Rfl: 0    famotidine (Pepcid) 20 MG tablet, Take 1 tablet by mouth 2 (Two) Times a Day., Disp: 60 tablet, Rfl: 3    Allergies:  Allergies   Allergen Reactions    Ciprofloxacin Rash    Doxycycline Hyclate Rash    Doxycycline Monohydrate Rash    Latex Rash    Sulfa Antibiotics Rash    Sulfamethoxazole-Trimethoprim Hives and Rash    Trimethoprim Rash       Family Hx:  Family History   Problem Relation Age of Onset    Heart failure Mother     Early death Mother     Asthma Sister     Depression Sister     Vision loss Sister     Other Sister     Cancer Maternal Aunt     Skin cancer Maternal Grandmother     Anxiety disorder Maternal Grandmother     Colon cancer Maternal Grandfather     Heart failure Maternal Grandfather     Diabetes Maternal Grandfather     Heart disease Maternal Grandfather     Hyperlipidemia Maternal Grandfather     Colon cancer Paternal Grandfather     Heart disease Maternal Great-Grandfather     Breast cancer Other 60    Ovarian cancer Neg Hx     Uterine cancer Neg Hx     Prostate cancer Neg Hx         Social History:  Social History     Socioeconomic History    Marital status: Single   Tobacco Use    Smoking status: Never     Passive exposure: Yes    Smokeless tobacco: Never    Tobacco comments:     secondhand smoke exposure   Vaping Use    Vaping  status: Never Used    Passive vaping exposure: Yes   Substance and Sexual Activity    Alcohol use: Never    Drug use: Never    Sexual activity: Defer     Birth control/protection: I.U.D.       Review of Systems  Review of Systems   Gastrointestinal:  Positive for abdominal pain and diarrhea.       Objective:     /86 (BP Location: Left arm, Patient Position: Sitting, Cuff Size: Large Adult)   Pulse 91   Temp 98.3 °F (36.8 °C)   Wt 119 kg (262 lb 3.2 oz)   LMP 03/07/2024   SpO2 97%   BMI 45.01 kg/m²   Physical Exam  Constitutional:       General: She is not in acute distress.     Appearance: Normal appearance. She is obese. She is not ill-appearing, toxic-appearing or diaphoretic.   Pulmonary:      Effort: Pulmonary effort is normal. No respiratory distress.   Neurological:      Mental Status: She is alert.   Psychiatric:         Mood and Affect: Mood normal.         Behavior: Behavior normal.          Assessment/Plan:     Diagnoses and all orders for this visit:    1. Gastroesophageal reflux disease without esophagitis (Primary)  2. Chronic diarrhea  3. Abdominal cramps      Kinsey has a history of uncontrolled gastroesophageal reflux disease.  She is currently taking omeprazole 40 mg and has been compliant with this medication for over a month now.  Despite this, her symptoms are not controlled. I will add Pepcid 20 mg twice daily today.  However, Kinsey tells me she has a history of serious symptoms requiring GI involvement.  She says she is needed no less than 3 upper abdominal scopes or EGDs. She is having intermittent cramping abdominal pain related to her underlying condition.  We obtained CT of the abdomen pelvis in March that was negative for any acute intra-abdominal pathology.  I will send in some as needed Bentyl today but I believe she needs GI involvement. Now, she tells me she previously was seeing gastroenterology and her last visit was in 2022.  However, she requests a new referral today  to see a new gastroenterologist and I will place this at her request.  In the interval, while waiting for this appointment, if symptoms continue to fail to improve with addition of Pepcid, would also consider adding Carafate.    -     omeprazole (priLOSEC) 40 MG capsule; Take 1 capsule by mouth Daily.  Dispense: 90 capsule; Refill: 1  -     famotidine (Pepcid) 20 MG tablet; Take 1 tablet by mouth 2 (Two) Times a Day.  Dispense: 60 tablet; Refill: 3  -     Ambulatory Referral to Gastroenterology    -     dicyclomine (BENTYL) 10 MG capsule; Take 1 capsule by mouth 4 (Four) Times a Day Before Meals & at Bedtime As Needed for Abdominal Cramping.  Dispense: 120 capsule; Refill: 0    4. Hyperglycemia  -     POC Glycosylated Hemoglobin (Hb A1C)          Rx changes: Adding Pepcid 20 mg twice daily and as needed Bentyl    Follow-up:     Return if symptoms worsen or fail to improve, for Next scheduled follow up.    Preventative:  Health Maintenance   Topic Date Due    URINE MICROALBUMIN  Never done    BMI FOLLOWUP  Never done    Pneumococcal Vaccine 0-64 (1 of 2 - PCV) Never done    ANNUAL PHYSICAL  Never done    DIABETIC FOOT EXAM  Never done    COVID-19 Vaccine (3 - 2023-24 season) 09/01/2023    PAP SMEAR  05/04/2024    INFLUENZA VACCINE  08/01/2024    DIABETIC EYE EXAM  08/04/2024    HEMOGLOBIN A1C  10/02/2024    COLORECTAL CANCER SCREENING  07/11/2027    TDAP/TD VACCINES (3 - Td or Tdap) 09/30/2030    HEPATITIS C SCREENING  Completed    Hepatitis B  Completed    HPV VACCINES  Completed    CHLAMYDIA SCREENING  Discontinued         This document has been electronically signed by Andrea Rollins MD on April 2, 2024 14:01 EDT       Parts of this note are electronic transcriptions/translations of spoken language to printed text using the Dragon Dictation system.

## 2024-04-03 LAB
QT INTERVAL: 397 MS
QT INTERVAL: 412 MS
QTC INTERVAL: 413 MS
QTC INTERVAL: 418 MS

## 2024-04-03 NOTE — ED PROVIDER NOTES
Time: 8:22 PM EDT  Date of encounter:  4/2/2024  Independent Historian/Clinical History and Information was obtained by:   Patient    History is limited by: N/A    Chief Complaint: Chest pain      History of Present Illness:  Patient is a 28 y.o. year old female who presents to the emergency department for evaluation of nonradiating central chest pain that started today around 4 PM.  Patient denies diaphoresis and shortness of air.  Patient denies cough and fever.  Patient denies recent travel.    HPI    Patient Care Team  Primary Care Provider: Andrea Rollins MD    Past Medical History:     Allergies   Allergen Reactions    Ciprofloxacin Rash    Doxycycline Hyclate Rash    Doxycycline Monohydrate Rash    Latex Rash    Sulfa Antibiotics Rash    Sulfamethoxazole-Trimethoprim Hives and Rash    Trimethoprim Rash     Past Medical History:   Diagnosis Date    Anxiety     Asthma     PRN INHALERS    Borderline diabetic     NO MEDICATIONS AS OF YET TRYING CHANGE OF DIET- DOESN'T CHECK BG AT HOME    Clostridium difficile diarrhea 01/30/2022    DENIES ANY CURRENT ISSUES    Depression     GERD (gastroesophageal reflux disease)     HL (hearing loss)     Hypertension     Irritable bowel syndrome     Kidney stones     Migraine without aura     Obesity     Ovarian cyst     right ovary     PONV (postoperative nausea and vomiting)     PVC (premature ventricular contraction)     FOLLOWED BY DR FIGUEROA HAS SCHEDULED ECHO ON 7/3/23 . REPORTS ISSUES WITH UPPER LEFT CHEST PAIN BUT STATES IS SORE WITH PALPATION ALSO REPORTS SOA WITH EXERTION. ACTIVE WORKS FULL TIME    Right ventricular cardiac abnormality     DILATATION PER DR FIGUEROA NOTE - DENIED CP/SOB    Seasonal allergies     Sleep apnea     DOES NOT USE CPAP    Urinary tract infection     NO CURRENT ISSUES     Past Surgical History:   Procedure Laterality Date    BILATERAL BREAST REDUCTION Bilateral 7/11/2023    Procedure: BREAST REDUCTION BILATERAL EXCISION OF LEFT UPPER  CHEST MOLE;  Surgeon: Mariah Lieberman MD;  Location: [facility] OR [facility];  Service: Plastics;  Laterality: Bilateral;    COLONOSCOPY  2020    COLONOSCOPY N/A 7/11/2022    Procedure: COLONOSCOPY BIOPSY;  Surgeon: David Yip MD;  Location: [facility] ENDOSCOPY;  Service: Gastroenterology;  Laterality: N/A;  NORMAL COLON    ENDOSCOPY      EXCISION LESION Left 7/11/2023    Procedure: BREAST REDUCTION BILATERAL EXCISION OF LEFT UPPER CHEST MOLE;  Surgeon: Mariah Lieberman MD;  Location: [facility] OR [facility];  Service: Plastics;  Laterality: Left;    KIDNEY STONE SURGERY  11/2021    URETEROSCOPY LASER LITHOTRIPSY WITH STENT INSERTION Left 11/13/2021    Procedure: URETEROSCOPY LASER LITHOTRIPSY WITH STENT INSERTION STONE BASKET EXTRACTION;  Surgeon: Colt Sanders MD;  Location: [facility] MAIN OR;  Service: Urology;  Laterality: Left;    URETEROSCOPY LASER LITHOTRIPSY WITH STENT INSERTION Right 8/18/2023    Procedure: CYSTOSCOPY URETEROSCOPY RETROGRADE PYELOGRAM HOLMIUM LASER STENT INSERTION, right;  Surgeon: Bailee Edgar MD;  Location: [facility] MAIN OR;  Service: Urology;  Laterality: Right;     Family History   Problem Relation Age of Onset    Heart failure Mother     Early death Mother     Asthma Sister     Depression Sister     Vision loss Sister     Other Sister     Cancer Maternal Aunt     Skin cancer Maternal Grandmother     Anxiety disorder Maternal Grandmother     Colon cancer Maternal Grandfather     Heart failure Maternal Grandfather     Diabetes Maternal Grandfather     Heart disease Maternal Grandfather     Hyperlipidemia Maternal Grandfather     Colon cancer Paternal Grandfather     Heart disease Maternal Great-Grandfather     Breast cancer Other 60    Ovarian cancer Neg Hx     Uterine cancer Neg Hx     Prostate cancer Neg Hx        Home Medications:  Prior to Admission medications    Medication Sig Start Date End Date Taking? Authorizing Provider   albuterol sulfate  (90 Base) MCG/ACT  inhaler INHALE 2 PUFFS INTO THE LUNGS EVERY 4 (FOUR) HOURS AS NEEDED FOR SHORTNESS OF AIR. 8/22/22   ProviderEric MD   bisoprolol (ZEBeta) 5 MG tablet Take 0.5 tablets by mouth Daily.    Provider, MD Eric   dicyclomine (BENTYL) 10 MG capsule Take 1 capsule by mouth 4 (Four) Times a Day Before Meals & at Bedtime As Needed for Abdominal Cramping. 4/2/24   Andrea Rollins MD   famotidine (Pepcid) 20 MG tablet Take 1 tablet by mouth 2 (Two) Times a Day. 4/2/24   Andrea Rollins MD   omeprazole (priLOSEC) 40 MG capsule Take 1 capsule by mouth Daily. 4/2/24   Andrea Rollins MD   ondansetron ODT (ZOFRAN-ODT) 4 MG disintegrating tablet Place 1 tablet on the tongue Every 6 (Six) Hours As Needed for Vomiting. 2/19/24   Ashok Etienne MD   venlafaxine XR (Effexor XR) 37.5 MG 24 hr capsule Take 1 capsule by mouth Daily. 3/20/24   Andrea Rollins MD   fluticasone (FLONASE) 50 MCG/ACT nasal spray 2 sprays into the nostril(s) as directed by provider Daily. 7/25/21 9/19/21  Rosio Mccoy APRN   nitrofurantoin, macrocrystal-monohydrate, (MACROBID) 100 MG capsule Take 1 capsule by mouth 2 (Two) Times a Day for 7 days.  Patient not taking: Reported on 4/2/2024 3/31/24 4/2/24  Ashok Etienne MD   omeprazole (priLOSEC) 40 MG capsule Take 1 capsule by mouth Daily. 2/19/24 4/2/24  Ashok Etienne MD        Social History:   Social History     Tobacco Use    Smoking status: Never     Passive exposure: Yes    Smokeless tobacco: Never    Tobacco comments:     secondhand smoke exposure   Vaping Use    Vaping status: Never Used    Passive vaping exposure: Yes   Substance Use Topics    Alcohol use: Never    Drug use: Never         Review of Systems:  Review of Systems   Constitutional:  Negative for chills and fever.   HENT:  Negative for congestion, rhinorrhea and sore throat.    Eyes:  Negative for pain and visual disturbance.   Respiratory:  Negative for apnea, cough, chest tightness and shortness of  "breath.    Cardiovascular:  Positive for chest pain. Negative for palpitations.   Gastrointestinal:  Negative for abdominal pain, diarrhea, nausea and vomiting.   Genitourinary:  Negative for difficulty urinating and dysuria.   Musculoskeletal:  Negative for joint swelling and myalgias.   Skin:  Negative for color change.   Neurological:  Negative for seizures and headaches.   Psychiatric/Behavioral: Negative.     All other systems reviewed and are negative.       Physical Exam:  /81   Pulse 72   Temp 98.3 °F (36.8 °C) (Oral)   Resp 13   Ht 162.6 cm (64\")   Wt 119 kg (262 lb)   LMP 03/07/2024   SpO2 97%   BMI 44.97 kg/m²     Physical Exam  Vitals and nursing note reviewed.   Constitutional:       General: She is not in acute distress.     Appearance: Normal appearance. She is not toxic-appearing.   HENT:      Head: Normocephalic and atraumatic.      Jaw: There is normal jaw occlusion.      Mouth/Throat:      Mouth: Mucous membranes are moist.   Eyes:      General: Lids are normal.      Extraocular Movements: Extraocular movements intact.      Conjunctiva/sclera: Conjunctivae normal.      Pupils: Pupils are equal, round, and reactive to light.   Cardiovascular:      Rate and Rhythm: Normal rate and regular rhythm.      Pulses: Normal pulses.      Heart sounds: Normal heart sounds.   Pulmonary:      Effort: Pulmonary effort is normal. No respiratory distress.      Breath sounds: Normal breath sounds. No wheezing or rhonchi.   Abdominal:      General: Abdomen is flat. There is no distension.      Palpations: Abdomen is soft.      Tenderness: There is no abdominal tenderness. There is no guarding or rebound.   Musculoskeletal:         General: Normal range of motion.      Cervical back: Normal range of motion and neck supple.      Right lower leg: No edema.      Left lower leg: No edema.   Skin:     General: Skin is warm and dry.   Neurological:      General: No focal deficit present.      Mental Status: " She is alert and oriented to person, place, and time. Mental status is at baseline.   Psychiatric:         Mood and Affect: Mood normal.         Behavior: Behavior normal.                  Procedures:  Procedures      Medical Decision Making:      Comorbidities that affect care:    Hypertension, asthma    External Notes reviewed:          The following orders were placed and all results were independently analyzed by me:  Orders Placed This Encounter   Procedures    XR Chest 1 View    Leeds Draw    High Sensitivity Troponin T    Comprehensive Metabolic Panel    Lipase    BNP    Magnesium    CBC Auto Differential    High Sensitivity Troponin T 2Hr    Ambulatory Referral to Cardiology    NPO Diet NPO Type: Strict NPO    Undress & Gown    Continuous Pulse Oximetry    Oxygen Therapy- Nasal Cannula; Titrate 1-6 LPM Per SpO2; 90 - 95%    ECG 12 Lead ED Triage Standing Order; Chest Pain    ECG 12 Lead ED Triage Standing Order; Chest Pain    Insert Peripheral IV    CBC & Differential    Green Top (Gel)    Lavender Top    Gold Top - SST    Light Blue Top       Medications Given in the Emergency Department:  Medications   sodium chloride 0.9 % flush 10 mL (has no administration in time range)   aspirin chewable tablet 324 mg (324 mg Oral Not Given 4/2/24 2216)        ED Course:    ED Course as of 04/02/24 2307 Tue Apr 02, 2024 2023 --- PROVIDER IN TRIAGE NOTE ---    The patient was evaluated by Orlando francis in triage. Orders were placed and the patient is currently awaiting disposition.    [AJ]      ED Course User Index  [AJ] Orlando Pizarro PAZhannaC       Labs:    Lab Results (last 24 hours)       Procedure Component Value Units Date/Time    POC Glycosylated Hemoglobin (Hb A1C) [[id number]] Collected: 04/02/24 1341    Specimen: Blood Updated: 04/02/24 1342     Hemoglobin A1C 5.5 %      Lot Number [id number]     Expiration Date 11/21/2025    High Sensitivity Troponin T [[id number]]  (Normal) Collected: 04/02/24  2020    Specimen: Blood from Arm, Right Updated: 04/02/24 2112     HS Troponin T 11 ng/L     Narrative:      High Sensitive Troponin T Reference Range:  <14.0 ng/L- Negative Female for AMI  <22.0 ng/L- Negative Male for AMI  >=14 - Abnormal Female indicating possible myocardial injury.  >=22 - Abnormal Male indicating possible myocardial injury.   Clinicians would have to utilize clinical acumen, EKG, Troponin, and serial changes to determine if it is an Acute Myocardial Infarction or myocardial injury due to an underlying chronic condition.         CBC & Differential [[id number]]  (Abnormal) Collected: 04/02/24 2020    Specimen: Blood from Arm, Right Updated: 04/02/24 2043    Narrative:      The following orders were created for panel order CBC & Differential.  Procedure                               Abnormality         Status                     ---------                               -----------         ------                     CBC Auto Differential[[id number]]        Abnormal            Final result                 Please view results for these tests on the individual orders.    Comprehensive Metabolic Panel [[id number]] Collected: 04/02/24 2020    Specimen: Blood from Arm, Right Updated: 04/02/24 2113     Glucose 96 mg/dL      BUN 7 mg/dL      Creatinine 0.88 mg/dL      Sodium 142 mmol/L      Potassium 3.9 mmol/L      Comment: Slight hemolysis detected by analyzer. Result may be falsely elevated.        Chloride 104 mmol/L      CO2 26.9 mmol/L      Calcium 9.1 mg/dL      Total Protein 6.9 g/dL      Albumin 4.0 g/dL      ALT (SGPT) 21 U/L      AST (SGOT) 19 U/L      Alkaline Phosphatase 101 U/L      Total Bilirubin 0.2 mg/dL      Globulin 2.9 gm/dL      A/G Ratio 1.4 g/dL      BUN/Creatinine Ratio 8.0     Anion Gap 11.1 mmol/L      eGFR 93.1 mL/min/1.73     Narrative:      GFR Normal >60  Chronic Kidney Disease <60  Kidney Failure <15      Lipase [[id number]]  (Normal) Collected: 04/02/24 2020    Specimen: Blood  from Arm, Right Updated: 04/02/24 2112     Lipase 30 U/L     BNP [[id number]]  (Normal) Collected: 04/02/24 2020    Specimen: Blood from Arm, Right Updated: 04/02/24 2109     proBNP 46.1 pg/mL     Narrative:      This assay is used as an aid in the diagnosis of individuals suspected of having heart failure. It can be used as an aid in the diagnosis of acute decompensated heart failure (ADHF) in patients presenting with signs and symptoms of ADHF to the emergency department (ED). In addition, NT-proBNP of <300 pg/mL indicates ADHF is not likely.    Age Range Result Interpretation  NT-proBNP Concentration (pg/mL:      <50             Positive            >450                   Gray                 300-450                    Negative             <300    50-75           Positive            >900                  Gray                300-900                  Negative            <300      >75             Positive            >1800                  Gray                300-1800                  Negative            <300    Magnesium [[id number]]  (Normal) Collected: 04/02/24 2020    Specimen: Blood from Arm, Right Updated: 04/02/24 2113     Magnesium 2.1 mg/dL     CBC Auto Differential [[id number]]  (Abnormal) Collected: 04/02/24 2020    Specimen: Blood from Arm, Right Updated: 04/02/24 2043     WBC 8.19 10*3/mm3      RBC 5.11 10*6/mm3      Hemoglobin 14.6 g/dL      Hematocrit 46.0 %      MCV 90.0 fL      MCH 28.6 pg      MCHC 31.7 g/dL      RDW 11.9 %      RDW-SD 39.1 fl      MPV 10.2 fL      Platelets 243 10*3/mm3      Neutrophil % 49.6 %      Lymphocyte % 40.8 %      Monocyte % 6.2 %      Eosinophil % 2.4 %      Basophil % 0.9 %      Immature Grans % 0.1 %      Neutrophils, Absolute 4.06 10*3/mm3      Lymphocytes, Absolute 3.34 10*3/mm3      Monocytes, Absolute 0.51 10*3/mm3      Eosinophils, Absolute 0.20 10*3/mm3      Basophils, Absolute 0.07 10*3/mm3      Immature Grans, Absolute 0.01 10*3/mm3      nRBC 0.0 /100 WBC     High  Sensitivity Troponin T 2Hr [[id number]] Collected: 04/02/24 2225    Specimen: Blood Updated: 04/02/24 2250     HS Troponin T <6 ng/L      Troponin T Delta --     Comment: Unable to calculate.       Narrative:      High Sensitive Troponin T Reference Range:  <14.0 ng/L- Negative Female for AMI  <22.0 ng/L- Negative Male for AMI  >=14 - Abnormal Female indicating possible myocardial injury.  >=22 - Abnormal Male indicating possible myocardial injury.   Clinicians would have to utilize clinical acumen, EKG, Troponin, and serial changes to determine if it is an Acute Myocardial Infarction or myocardial injury due to an underlying chronic condition.                  Imaging:    XR Chest 1 View    Result Date: 4/2/2024  AP PORTABLE CHEST  HISTORY: Chest pain.  COMPARISON: 12/1/2023  TECHNIQUE: AP portable chest x-ray.  FINDINGS: Cardiac and mediastinal contours are normal. Pulmonary vascularity is normal. The lungs are clear. No pneumothorax is identified.      No acute cardiopulmonary findings.  Electronically Signed By-Dr. Gabe Kahn MD On:4/2/2024 9:06 PM         Differential Diagnosis and Discussion:    Chest Pain:  Based on the patient's signs and symptoms, I considered aortic dissection, myocardial infaction, pulmonary embolism, cardiac tamponade, pericarditis, pneumothorax, musculoskeletal chest pain and other differential diagnosis as an etiology of the patient's chest pain.     All labs were reviewed and interpreted by me.  All X-rays impressions were independently interpreted by me.  EKG was interpreted by supervising attending.    MDM     The patient´s CBC that was reviewed and interpreted by me shows no abnormalities of critical concern. Of note, there is no anemia requiring a blood transfusion and the platelet count is acceptable.  The patient´s CMP that was reviewed and interpretted by me shows no abnormalities of critical concern. Of note, the patient´s sodium and potassium are acceptable. The patient´s  liver enzymes are unremarkable. The patient´s renal function (creatinine) is preserved. The patient has a normal anion gap.  Troponin x 2 within normal limits.  BNP within normal limits.  Patient's oxygen saturation is 98% on room air.  Patient's lungs are clear to auscultation bilaterally.  Patient is afebrile.  Patient is resting comfortably.  Chest x-ray shows no acute cardiopulmonary findings.  Patient's heart score is 2 indicating outpatient follow-up is appropriate.  I will provide ambulatory referral to cardiology for follow-up.  Instructed patient to return to ED the meantime she develops any new or worsening symptoms.  Patient states she understands and agrees with plan of care.          Patient Care Considerations:          Consultants/Shared Management Plan:    None    Social Determinants of Health:    Patient is independent, reliable, and has access to care.       Disposition and Care Coordination:    Discharged: The patient is suitable and stable for discharge with no need for consideration of admission.    I have explained the patient´s condition, diagnoses and treatment plan based on the information available to me at this time. I have answered questions and addressed any concerns. The patient has a good  understanding of the patient´s diagnosis, condition, and treatment plan as can be expected at this point. The vital signs have been stable. The patient´s condition is stable and appropriate for discharge from the emergency department.      The patient will pursue further outpatient evaluation with the primary care physician or other designated or consulting physician as outlined in the discharge instructions. They are agreeable to this plan of care and follow-up instructions have been explained in detail. The patient has received these instructions in written format and has expressed an understanding of the discharge instructions. The patient is aware that any significant change in condition or  worsening of symptoms should prompt an immediate return to this or the closest emergency department or call to 911.  I have explained discharge medications and the need for follow up with the patient/caretakers. This was also printed in the discharge instructions. Patient was discharged with the following medications and follow up:      Medication List      No changes were made to your prescriptions during this visit.      Valerie Beltran MD  [address] [place], [place] [address]zabeP[facility] Ridge Health [address]  [phone]    Schedule an appointment as soon as possible for a visit in 1 week  Follow-up       Final diagnoses:   Chest pain, unspecified type        ED Disposition       ED Disposition   Discharge    Condition   Stable    Comment   --               This medical record created using voice recognition software.             Harsh Byrd PA-C  04/02/24 [id number]

## 2024-04-05 ENCOUNTER — TELEPHONE (OUTPATIENT)
Dept: GASTROENTEROLOGY | Facility: CLINIC | Age: 29
End: 2024-04-05
Payer: COMMERCIAL

## 2024-04-05 NOTE — TELEPHONE ENCOUNTER
Kinsey Yolandasidra Suresh, 1995, has requested to transfer care from Joseph Yip MD to Roshni Salcido MD.    Reason for transfer: Patient ask to transfer to another GI.    Please review the patients records for possible transfer of care. The patient is aware that it is at the receiving provider's discretion to approve or deny this transfer request.

## 2024-04-05 NOTE — TELEPHONE ENCOUNTER
Attempted to call patient to explain Transfer of Care process with her. [facility]

## 2024-04-08 ENCOUNTER — TELEPHONE (OUTPATIENT)
Dept: FAMILY MEDICINE CLINIC | Facility: CLINIC | Age: 29
End: 2024-04-08
Payer: COMMERCIAL

## 2024-04-08 DIAGNOSIS — F32.A ANXIETY AND DEPRESSION: Primary | ICD-10-CM

## 2024-04-08 DIAGNOSIS — F41.9 ANXIETY AND DEPRESSION: ICD-10-CM

## 2024-04-08 DIAGNOSIS — F32.A DEPRESSION, UNSPECIFIED DEPRESSION TYPE: Primary | ICD-10-CM

## 2024-04-08 DIAGNOSIS — F41.9 ANXIETY: ICD-10-CM

## 2024-04-08 DIAGNOSIS — F41.9 ANXIETY AND DEPRESSION: Primary | ICD-10-CM

## 2024-04-08 DIAGNOSIS — F32.A ANXIETY AND DEPRESSION: ICD-10-CM

## 2024-04-08 NOTE — TELEPHONE ENCOUNTER
PT NIALL SCANNED IN CHART PT HAS OV SCHEDULED FOR 0412/24 LM TO CALL OFFICE TO SEE IF THIS APPOINTMENT WILL WORK

## 2024-04-09 ENCOUNTER — OFFICE VISIT (OUTPATIENT)
Dept: UROLOGY | Facility: CLINIC | Age: 29
End: 2024-04-09
Payer: COMMERCIAL

## 2024-04-09 ENCOUNTER — TELEPHONE (OUTPATIENT)
Dept: GASTROENTEROLOGY | Facility: CLINIC | Age: 29
End: 2024-04-09
Payer: COMMERCIAL

## 2024-04-09 VITALS
HEIGHT: 64 IN | BODY MASS INDEX: 44.69 KG/M2 | WEIGHT: 261.8 LBS | RESPIRATION RATE: 16 BRPM | SYSTOLIC BLOOD PRESSURE: 118 MMHG | DIASTOLIC BLOOD PRESSURE: 85 MMHG

## 2024-04-09 DIAGNOSIS — E83.59 CALCIUM OXALATE CALCULUS: Primary | ICD-10-CM

## 2024-04-09 NOTE — TELEPHONE ENCOUNTER
Kinsey Yolanda Kerwin, 1995, has requested to transfer care from Joseph Yip MD to Roshni Salcido MD.    Reason for transfer: Patient wants to transfer to another GI.     Please review the patients records for possible transfer of care. The patient is aware that it is at the receiving provider's discretion to approve or deny this transfer request.

## 2024-04-09 NOTE — PROGRESS NOTES
"    UROLOGY OFFICE follow-up NOTE    Subjective   HPI  Kinsey Suresh is a 29 y.o. female.  Presents for follow-up status post right ureteroscopy, stone treatment, 8/18/2023.  Patient subsequently underwent stent removal in office and presents with renal ultrasound prior to today's appointment.Patient states that she has been doing well since the procedure.  Currently denies any urinary symptoms.  Denies hematuria or flank pain.  No complaints today.        ____________  CT abdomen pelvis without contrast 3/8/2024: 2 mm nonobstructing calculus at the upper pole of the left kidney.  No right radiodense   renal calculus.  Negative for hydroureteronephrosis.  No obstructive uropathy.  Urinary bladder   thin-walled.      Renal ultrasound 1/13/2024: No hydronephrosis is seen bilaterally.  No definite nephrolithiasis.  No suspicious renal masses are seen.      Chemical stone analysis 8/18/2023: 100% calcium oxalate  Right URS, basket extraction 8/18/2023      Review of systems  A review of systems was performed, and positive findings are noted in the HPI.    Objective     Vital Signs:   /85 (BP Location: Left arm)   Resp 16   Ht 162.6 cm (64.02\")   Wt 119 kg (261 lb 12.8 oz)   BMI 44.92 kg/m²       Physical exam  No acute distress, well-nourished  Awake alert and oriented  Mood normal; affect normal    Problem List:  Patient Active Problem List   Diagnosis    Generalized abdominal pain    Macromastia    Class 3 severe obesity due to excess calories without serious comorbidity with body mass index (BMI) of 45.0 to 49.9 in adult    Essential (primary) hypertension    Skin lesion of breast    Right ureteral stone    S/P bilateral breast reduction    Depression    GERD (gastroesophageal reflux disease)       Assessment & Plan   Diagnoses and all orders for this visit:    1. Calcium oxalate calculus (Primary)          CT scan imaging reviewed and discussed with patient at length, no hydronephrosis, tiny " nonobstructing contralateral stone.  No further intervention necessary.  Patient to continue to monitor symptoms and notify with any issues.  Should she experience suspicion of renal colic or symptoms from trying to pass a stone, would warrant additional imaging via CT scan.    Calcium oxalate stone- Discussed dietary modifications for prevention of stone growth and recurrence including increased hydration, decrease sodium, normal calcium, low animal protein diet.      Informational handouts provided  All questions addressed     Patient to follow-up as needed

## 2024-04-16 NOTE — TELEPHONE ENCOUNTER
Bernardino the patient is requesting the LEXI from Deyanira Dumont/ Dr. Yip not Dr. Correa. Please advise on if you would like this patient to be scheduled as a new patient or follow up since she is established with our practice but would be new to our office.

## 2024-04-17 NOTE — TELEPHONE ENCOUNTER
Alba from [facility] scheduled the patient for an appointment on 05/14/24 with Makenzie Sigala.

## 2024-04-22 ENCOUNTER — OFFICE VISIT (OUTPATIENT)
Dept: FAMILY MEDICINE CLINIC | Facility: CLINIC | Age: 29
End: 2024-04-22
Payer: COMMERCIAL

## 2024-04-22 VITALS
OXYGEN SATURATION: 97 % | WEIGHT: 264 LBS | DIASTOLIC BLOOD PRESSURE: 76 MMHG | SYSTOLIC BLOOD PRESSURE: 121 MMHG | HEIGHT: 64 IN | HEART RATE: 87 BPM | BODY MASS INDEX: 45.07 KG/M2

## 2024-04-22 DIAGNOSIS — K21.9 GASTROESOPHAGEAL REFLUX DISEASE, UNSPECIFIED WHETHER ESOPHAGITIS PRESENT: Primary | ICD-10-CM

## 2024-04-22 DIAGNOSIS — K21.9 GASTROESOPHAGEAL REFLUX DISEASE WITHOUT ESOPHAGITIS: ICD-10-CM

## 2024-04-22 PROCEDURE — 99213 OFFICE O/P EST LOW 20 MIN: CPT | Performed by: STUDENT IN AN ORGANIZED HEALTH CARE EDUCATION/TRAINING PROGRAM

## 2024-04-22 RX ORDER — ONDANSETRON 4 MG/1
4 TABLET, ORALLY DISINTEGRATING ORAL EVERY 6 HOURS PRN
Qty: 15 TABLET | Refills: 0 | Status: SHIPPED | OUTPATIENT
Start: 2024-04-22

## 2024-04-22 RX ORDER — FAMOTIDINE 20 MG/1
20 TABLET, FILM COATED ORAL 2 TIMES DAILY
Qty: 60 TABLET | Refills: 3 | Status: SHIPPED | OUTPATIENT
Start: 2024-04-22

## 2024-04-22 RX ORDER — OMEPRAZOLE 40 MG/1
40 CAPSULE, DELAYED RELEASE ORAL DAILY
Qty: 90 CAPSULE | Refills: 1 | Status: SHIPPED | OUTPATIENT
Start: 2024-04-22

## 2024-04-22 RX ORDER — SUCRALFATE 1 G/1
1 TABLET ORAL 3 TIMES DAILY
Qty: 90 TABLET | Refills: 1 | Status: SHIPPED | OUTPATIENT
Start: 2024-04-22

## 2024-04-22 NOTE — PROGRESS NOTES
Subjective:       Kinsey Suresh is a 29 y.o. female with a concurrent medical history of obesity with BMI of 44.96, gastroesophageal reflux disease, hypertension, and depression who presents for same-day visit to discuss abdominal pain.    At our last visit, on 4/02/2024, Ms. Suresh reported history of uncontrolled GERD despite being compliant with omeprazole 40 mg for approximately 1 month at the time of our visit.  I had added Pepcid 20 mg twice daily.    She also endorsed abdominal pain.  No significant abdominal findings were noted on 3/8/2024 CT of the abdomen pelvis.  Although I prescribed some as needed Bentyl, because of her complex history as she told me she had required multiple EGDs, I did send her a new referral to GI.  Her last visit with them had been in 2022.    Today, Ms. Suresh tells me heartburn is improved.  Will refill omeprazole and Pepcid today.      However, she continues to report nausea and abdominal pain. She wakes up with nausea and that is what bothers her - woke up at 6 in the morning and experienced nausea.     I think it is reasonable to start Carafate at this time.    Bentyl has not made a difference for her and we will discontinue it today.    Referral to GI is in place and appointment is on May 14th.     Ms. Suresh also reports recent onset ear pain and dizziness.  Symptoms started yesterday.  She denies fevers or chills or signs of infection.  She denies history of allergies.  On physical exam, she does have some slight cerumen impaction in the right ear, I believe we did previously flush this year and we will flush again today.  However, symptoms to me seem potentially consistent with benign paroxysmal positional vertigo as she does endorse some symptoms elicited by position changes.  If they fail to improve, would set her up for physical therapy for repositioning exercises      The following portions of the patient's history were reviewed and updated as appropriate: allergies,  current medications, past family history, past medical history, past social history, past surgical history, and problem list.    Past Medical Hx:  Past Medical History:   Diagnosis Date    Anxiety     Asthma     PRN INHALERS    Borderline diabetic     NO MEDICATIONS AS OF YET TRYING CHANGE OF DIET- DOESN'T CHECK BG AT HOME    Clostridium difficile diarrhea 01/30/2022    DENIES ANY CURRENT ISSUES    Depression     GERD (gastroesophageal reflux disease)     HL (hearing loss)     Hypertension     Irritable bowel syndrome     Kidney stones     Migraine without aura     Obesity     Ovarian cyst     right ovary     PONV (postoperative nausea and vomiting)     PVC (premature ventricular contraction)     FOLLOWED BY DR FIGUEROA HAS SCHEDULED ECHO ON 7/3/23 . REPORTS ISSUES WITH UPPER LEFT CHEST PAIN BUT STATES IS SORE WITH PALPATION ALSO REPORTS SOA WITH EXERTION. ACTIVE WORKS FULL TIME    Right ventricular cardiac abnormality     DILATATION PER DR FIGUEROA NOTE - DENIED CP/SOB    Seasonal allergies     Sleep apnea     DOES NOT USE CPAP    Urinary tract infection     NO CURRENT ISSUES       Past Surgical Hx:  Past Surgical History:   Procedure Laterality Date    BILATERAL BREAST REDUCTION Bilateral 7/11/2023    Procedure: BREAST REDUCTION BILATERAL EXCISION OF LEFT UPPER CHEST MOLE;  Surgeon: Mariah Lieberman MD;  [place] [facility] [place] [facility] [place]  Service: [place];  Laterality: Bilateral;    COLONOSCOPY  2020    COLONOSCOPY N/A 7/11/2022    Procedure: COLONOSCOPY BIOPSY;  Surgeon: David Yip MD;  Location: [facility] ENDOSCOPY;  Service: Gastroenterology;  Laterality: N/A;  NORMAL COLON    ENDOSCOPY      EXCISION LESION Left 7/11/2023    Procedure: BREAST REDUCTION BILATERAL EXCISION OF LEFT UPPER CHEST MOLE;  Surgeon: Mariah Lieberman MD;  [place] [facility] [place] [facility] [place]  Service: Plastics;  Laterality: Left;    KIDNEY STONE SURGERY  11/2021    URETEROSCOPY LASER LITHOTRIPSY WITH STENT INSERTION Left 11/13/2021     Procedure: URETEROSCOPY LASER LITHOTRIPSY WITH STENT INSERTION STONE BASKET EXTRACTION;  Surgeon: Colt Sanders MD;  Location: [facility] MAIN OR;  Service: Urology;  Laterality: Left;    URETEROSCOPY LASER LITHOTRIPSY WITH STENT INSERTION Right 8/18/2023    Procedure: CYSTOSCOPY URETEROSCOPY RETROGRADE PYELOGRAM HOLMIUM LASER STENT INSERTION, right;  Surgeon: Bailee Edgar MD;  Location: [facility] MAIN OR;  Service: Urology;  Laterality: Right;       Current Meds:    Current Outpatient Medications:     albuterol sulfate  (90 Base) MCG/ACT inhaler, INHALE 2 PUFFS INTO THE LUNGS EVERY 4 (FOUR) HOURS AS NEEDED FOR SHORTNESS OF AIR., Disp: , Rfl:     bisoprolol (ZEBeta) 5 MG tablet, Take 0.5 tablets by mouth Daily., Disp: , Rfl:     famotidine (Pepcid) 20 MG tablet, Take 1 tablet by mouth 2 (Two) Times a Day., Disp: 60 tablet, Rfl: 3    omeprazole (priLOSEC) 40 MG capsule, Take 1 capsule by mouth Daily., Disp: 90 capsule, Rfl: 1    ondansetron ODT (ZOFRAN-ODT) 4 MG disintegrating tablet, Place 1 tablet on the tongue Every 6 (Six) Hours As Needed for Vomiting., Disp: 15 tablet, Rfl: 0    venlafaxine XR (Effexor XR) 37.5 MG 24 hr capsule, Take 1 capsule by mouth Daily., Disp: 30 capsule, Rfl: 1    sucralfate (Carafate) 1 g tablet, Take 1 tablet by mouth 3 (Three) Times a Day., Disp: 90 tablet, Rfl: 1    Allergies:  Allergies   Allergen Reactions    Ciprofloxacin Rash    Doxycycline Hyclate Rash    Doxycycline Monohydrate Rash    Latex Rash    Sulfa Antibiotics Rash    Sulfamethoxazole-Trimethoprim Hives and Rash    Trimethoprim Rash       Family Hx:  Family History   Problem Relation Age of Onset    Heart failure Mother     Early death Mother     Asthma Sister     Depression Sister     Vision loss Sister     Other Sister     Cancer Maternal Aunt     Skin cancer Maternal Grandmother     Anxiety disorder Maternal Grandmother     Colon cancer Maternal Grandfather     Heart failure Maternal Grandfather      "Diabetes Maternal Grandfather     Heart disease Maternal Grandfather     Hyperlipidemia Maternal Grandfather     Colon cancer Paternal Grandfather     Heart disease Maternal Great-Grandfather     Breast cancer Other 60    Ovarian cancer Neg Hx     Uterine cancer Neg Hx     Prostate cancer Neg Hx         Social History:  Social History     Socioeconomic History    Marital status: Single   Tobacco Use    Smoking status: Never     Passive exposure: Yes    Smokeless tobacco: Never    Tobacco comments:     secondhand smoke exposure   Vaping Use    Vaping status: Never Used    Passive vaping exposure: Yes   Substance and Sexual Activity    Alcohol use: Never    Drug use: Never    Sexual activity: Defer     Birth control/protection: I.U.D.       Review of Systems  Review of Systems   HENT:  Positive for ear pain.    Gastrointestinal:  Positive for abdominal pain (epigastric, intermittent) and nausea. Negative for abdominal distention, blood in stool, constipation, diarrhea and vomiting.   Neurological:  Positive for dizziness.       Objective:     /76 (BP Location: Right arm, Patient Position: Sitting, Cuff Size: Adult)   Pulse 87   Ht 162.6 cm (64.02\")   Wt 120 kg (264 lb)   LMP 03/07/2024   SpO2 97%   BMI 45.29 kg/m²   Physical Exam  Constitutional:       Appearance: Normal appearance.   HENT:      Right Ear: Ear canal and external ear normal. There is impacted cerumen.      Left Ear: Tympanic membrane, ear canal and external ear normal. There is no impacted cerumen.   Pulmonary:      Effort: Pulmonary effort is normal. No respiratory distress.   Neurological:      Mental Status: She is alert.   Psychiatric:         Mood and Affect: Mood normal.         Behavior: Behavior normal.          Assessment/Plan:     Diagnoses and all orders for this visit:    1. Gastroesophageal reflux disease, unspecified whether esophagitis present (Primary)    At our last visit, on 4/02/2024, Ms. Suresh reported history of " uncontrolled GERD despite being compliant with omeprazole 40 mg for approximately 1 month at the time of our visit.  I had added Pepcid 20 mg twice daily.    She also endorsed abdominal pain.  No significant abdominal findings were noted on 3/8/2024 CT of the abdomen pelvis.  Although I prescribed some as needed Bentyl, because of her complex history as she told me she had required multiple EGDs, I did send her a new referral to GI.  Her last visit with them had been in 2022.    Today, Ms. Suresh tells me heartburn is improved.  Will refill omeprazole and Pepcid today.      However, she continues to report nausea and abdominal pain. She wakes up with nausea and that is what bothers her - woke up at 6 in the morning and experienced nausea.     I think it is reasonable to start Carafate at this time.    Bentyl has not made a difference for her and we will discontinue it today.    Referral to GI is in place and appointment is on May 14th.     -     sucralfate (Carafate) 1 g tablet; Take 1 tablet by mouth 3 (Three) Times a Day.  Dispense: 90 tablet; Refill: 1  -     ondansetron ODT (ZOFRAN-ODT) 4 MG disintegrating tablet; Place 1 tablet on the tongue Every 6 (Six) Hours As Needed for Vomiting.  Dispense: 15 tablet; Refill: 0-     omeprazole (priLOSEC) 40 MG capsule; Take 1 capsule by mouth Daily.  Dispense: 90 capsule; Refill: 1  -     famotidine (Pepcid) 20 MG tablet; Take 1 tablet by mouth 2 (Two) Times a Day.  Dispense: 60 tablet; Refill: 3          Rx changes: Discontinuing Bentyl.  Prescribing Carafate.    Follow-up:     Return if symptoms worsen or fail to improve, for Next scheduled follow up.    Preventative:  Health Maintenance   Topic Date Due    URINE MICROALBUMIN  Never done    BMI FOLLOWUP  Never done    Pneumococcal Vaccine 0-64 (1 of 2 - PCV) Never done    ANNUAL PHYSICAL  Never done    DIABETIC FOOT EXAM  Never done    COVID-19 Vaccine (3 - 2023-24 season) 04/23/2024 (Originally 9/1/2023)    PAP SMEAR   05/04/2024    INFLUENZA VACCINE  08/01/2024    DIABETIC EYE EXAM  08/04/2024    HEMOGLOBIN A1C  10/02/2024    COLORECTAL CANCER SCREENING  07/11/2027    TDAP/TD VACCINES (3 - Td or Tdap) 09/30/2030    HEPATITIS C SCREENING  Completed    Hepatitis B  Completed    CHLAMYDIA SCREENING  Discontinued         This document has been electronically signed by Andrea Rollins MD on April 22, 2024 08:55 EDT       Parts of this note are electronic transcriptions/translations of spoken language to printed text using the Dragon Dictation system.

## 2024-04-24 ENCOUNTER — TELEPHONE (OUTPATIENT)
Dept: OBSTETRICS AND GYNECOLOGY | Facility: CLINIC | Age: 29
End: 2024-04-24
Payer: COMMERCIAL

## 2024-04-24 NOTE — TELEPHONE ENCOUNTER
Caller: Kinsey Suresh    Relationship: Self    Best call back number: [phone]    What is the best time to reach you:     ANY    Who are you requesting to speak with (clinical staff, provider,  specific staff member):     DYANA MCCRAY OR NURSE    What was the call regarding:     PT C/O INTERMITTENT BLEEDING, SOMETIME SPOTTING SOMETIMES HEAVY,    PT WOULD LIKE TO KNOW IF THERE IS ANYTHING SHE CAN TAKE

## 2024-05-14 ENCOUNTER — OFFICE VISIT (OUTPATIENT)
Dept: GASTROENTEROLOGY | Facility: CLINIC | Age: 29
End: 2024-05-14
Payer: COMMERCIAL

## 2024-05-14 ENCOUNTER — OFFICE VISIT (OUTPATIENT)
Dept: OBSTETRICS AND GYNECOLOGY | Facility: CLINIC | Age: 29
End: 2024-05-14
Payer: COMMERCIAL

## 2024-05-14 VITALS
BODY MASS INDEX: 44.66 KG/M2 | HEIGHT: 64 IN | HEART RATE: 60 BPM | DIASTOLIC BLOOD PRESSURE: 54 MMHG | SYSTOLIC BLOOD PRESSURE: 108 MMHG | WEIGHT: 261.6 LBS

## 2024-05-14 VITALS
HEART RATE: 88 BPM | SYSTOLIC BLOOD PRESSURE: 126 MMHG | BODY MASS INDEX: 44.39 KG/M2 | HEIGHT: 64 IN | WEIGHT: 260 LBS | DIASTOLIC BLOOD PRESSURE: 74 MMHG

## 2024-05-14 DIAGNOSIS — K62.5 RECTAL BLEEDING: ICD-10-CM

## 2024-05-14 DIAGNOSIS — K21.9 GASTROESOPHAGEAL REFLUX DISEASE, UNSPECIFIED WHETHER ESOPHAGITIS PRESENT: ICD-10-CM

## 2024-05-14 DIAGNOSIS — R10.33 PERIUMBILICAL ABDOMINAL PAIN: Primary | ICD-10-CM

## 2024-05-14 DIAGNOSIS — N89.8 VAGINAL DISCHARGE: ICD-10-CM

## 2024-05-14 DIAGNOSIS — N93.9 ABNORMAL UTERINE BLEEDING: Primary | ICD-10-CM

## 2024-05-14 DIAGNOSIS — R10.2 PELVIC PAIN IN FEMALE: ICD-10-CM

## 2024-05-14 DIAGNOSIS — Z86.010 HISTORY OF COLON POLYPS: ICD-10-CM

## 2024-05-14 LAB — PROLACTIN SERPL-MCNC: 26 NG/ML (ref 4.79–23.3)

## 2024-05-14 PROCEDURE — 87798 DETECT AGENT NOS DNA AMP: CPT | Performed by: OBSTETRICS & GYNECOLOGY

## 2024-05-14 PROCEDURE — 87591 N.GONORRHOEAE DNA AMP PROB: CPT | Performed by: OBSTETRICS & GYNECOLOGY

## 2024-05-14 PROCEDURE — 87661 TRICHOMONAS VAGINALIS AMPLIF: CPT | Performed by: OBSTETRICS & GYNECOLOGY

## 2024-05-14 PROCEDURE — 84439 ASSAY OF FREE THYROXINE: CPT | Performed by: OBSTETRICS & GYNECOLOGY

## 2024-05-14 PROCEDURE — 87801 DETECT AGNT MULT DNA AMPLI: CPT | Performed by: OBSTETRICS & GYNECOLOGY

## 2024-05-14 PROCEDURE — 84146 ASSAY OF PROLACTIN: CPT | Performed by: OBSTETRICS & GYNECOLOGY

## 2024-05-14 PROCEDURE — 87491 CHLMYD TRACH DNA AMP PROBE: CPT | Performed by: OBSTETRICS & GYNECOLOGY

## 2024-05-14 PROCEDURE — 84443 ASSAY THYROID STIM HORMONE: CPT | Performed by: OBSTETRICS & GYNECOLOGY

## 2024-05-14 PROCEDURE — 99214 OFFICE O/P EST MOD 30 MIN: CPT | Performed by: NURSE PRACTITIONER

## 2024-05-14 PROCEDURE — G0123 SCREEN CERV/VAG THIN LAYER: HCPCS | Performed by: OBSTETRICS & GYNECOLOGY

## 2024-05-14 RX ORDER — DICYCLOMINE HYDROCHLORIDE 10 MG/1
20 CAPSULE ORAL 3 TIMES DAILY PRN
Qty: 120 CAPSULE | Refills: 1 | Status: SHIPPED | OUTPATIENT
Start: 2024-05-14

## 2024-05-14 NOTE — PROGRESS NOTES
Chief Complaint   Abdominal Pain, Diarrhea, and Heartburn    History of Present Illness       Kinsey Suresh is a 29 y.o. female who presents to [facility] for follow-up For colitis.  She is new to me today.  She was last seen in the office by nurse practitioner Deyanira Dumont on 5/9/2022.     She admits lately she has been having issues with painful stools and rectal bleeding. She admits when she takes supplements she has been having issues with diarrhea. She is also having abd cramping as well. When she had the bleeding she was having loose stools. Stools have been more solid lately when she takes less of the supplements. Last episode of rectal bleeding was last month. Last diarrhea was last weekend.     Last colonoscopy was done by Dr. Yip on 7/11/2022 for chronic diarrhea.  Colonoscopy was normal.  Path was negative. History colon polyps.     History of C. difficile    History of fatty liver disease----She had a CT scan of the abdomen and pelvis done on 3/8/2024 that was negative for any acute abdominal process.    History of GERD--- omeprazole 40 mg and Pepcid 20 mg, Carafate---She is doing better on the that regimen. Will have occasional breakthrough reflux but its been better lately. She denies any dysphagia with food. But will have issues swallowing pills.       Gi FH---Paternal grandfather with colon cancer. Father with throat cancer.     Results       Result Review :                       Past Medical History       Past Medical History:   Diagnosis Date    Anxiety     Asthma     PRN INHALERS    Borderline diabetic     NO MEDICATIONS AS OF YET TRYING CHANGE OF DIET- DOESN'T CHECK BG AT HOME    Clostridium difficile diarrhea 01/30/2022    DENIES ANY CURRENT ISSUES    Depression     GERD (gastroesophageal reflux disease)     HL (hearing loss)     Hypertension     Irritable bowel syndrome     Kidney stones     Migraine without aura     Obesity     Ovarian cyst      right ovary     PONV (postoperative nausea and vomiting)     PVC (premature ventricular contraction)     FOLLOWED BY DR FIGUEROA HAS SCHEDULED ECHO ON 7/3/23 . REPORTS ISSUES WITH UPPER LEFT CHEST PAIN BUT STATES IS SORE WITH PALPATION ALSO REPORTS SOA WITH EXERTION. ACTIVE WORKS FULL TIME    Right ventricular cardiac abnormality     DILATATION PER DR FIGUEROA NOTE - DENIED CP/SOB    Seasonal allergies     Sleep apnea     DOES NOT USE CPAP    Urinary tract infection     NO CURRENT ISSUES       Past Surgical History:   Procedure Laterality Date    BILATERAL BREAST REDUCTION Bilateral 7/11/2023    Procedure: BREAST REDUCTION BILATERAL EXCISION OF LEFT UPPER CHEST MOLE;  Surgeon: Mariah Lieberman MD;  [place] [facility] [place] [facility] [place];  Service: Plastics;  Laterality: Bilateral;    COLONOSCOPY  2020    COLONOSCOPY N/A 7/11/2022    Procedure: COLONOSCOPY BIOPSY;  Surgeon: David Yip MD;  Location: [facility] ENDOSCOPY;  Service: Gastroenterology;  Laterality: N/A;  NORMAL COLON    ENDOSCOPY      EXCISION LESION Left 7/11/2023    Procedure: BREAST REDUCTION BILATERAL EXCISION OF LEFT UPPER CHEST MOLE;  Surgeon: Mariah Lieberman MD;  [place]: [facility] [place] [facility] [place];  Service: Plastics;  Laterality: Left;    KIDNEY STONE SURGERY  11/2021    URETEROSCOPY LASER LITHOTRIPSY WITH STENT INSERTION Left 11/13/2021    Procedure: URETEROSCOPY LASER LITHOTRIPSY WITH STENT INSERTION STONE BASKET EXTRACTION;  Surgeon: Colt Sanders MD;  Location: [facility] MAIN OR;  Service: Urology;  Laterality: Left;    URETEROSCOPY LASER LITHOTRIPSY WITH STENT INSERTION Right 8/18/2023    Procedure: CYSTOSCOPY URETEROSCOPY RETROGRADE PYELOGRAM HOLMIUM LASER STENT INSERTION, right;  Surgeon: Bailee Edgar MD;  Location: [facility] MAIN OR;  Service: Urology;  Laterality: Right;         Current Outpatient Medications:     albuterol sulfate  (90 Base) MCG/ACT inhaler, INHALE 2 PUFFS INTO THE LUNGS EVERY 4 (FOUR) HOURS AS NEEDED FOR  SHORTNESS OF AIR., Disp: , Rfl:     bisoprolol (ZEBeta) 5 MG tablet, Take 0.5 tablets by mouth Daily., Disp: , Rfl:     famotidine (Pepcid) 20 MG tablet, Take 1 tablet by mouth 2 (Two) Times a Day., Disp: 60 tablet, Rfl: 3    omeprazole (priLOSEC) 40 MG capsule, Take 1 capsule by mouth Daily., Disp: 90 capsule, Rfl: 1    ondansetron ODT (ZOFRAN-ODT) 4 MG disintegrating tablet, Place 1 tablet on the tongue Every 6 (Six) Hours As Needed for Vomiting., Disp: 15 tablet, Rfl: 0    sucralfate (Carafate) 1 g tablet, Take 1 tablet by mouth 3 (Three) Times a Day., Disp: 90 tablet, Rfl: 1    venlafaxine XR (Effexor XR) 37.5 MG 24 hr capsule, Take 1 capsule by mouth Daily., Disp: 30 capsule, Rfl: 1    dicyclomine (BENTYL) 10 MG capsule, Take 2 capsules by mouth 3 (Three) Times a Day As Needed for Abdominal Cramping., Disp: 120 capsule, Rfl: 1     Allergies   Allergen Reactions    Ciprofloxacin Rash    Doxycycline Hyclate Rash    Doxycycline Monohydrate Rash    Latex Rash    Sulfa Antibiotics Rash    Sulfamethoxazole-Trimethoprim Hives and Rash    Trimethoprim Rash       Family History   Problem Relation Age of Onset    Heart failure Mother     Early death Mother     Asthma Sister     Depression Sister     Vision loss Sister     Other Sister     Cancer Maternal Aunt     Skin cancer Maternal Grandmother     Anxiety disorder Maternal Grandmother     Colon cancer Maternal Grandfather     Heart failure Maternal Grandfather     Diabetes Maternal Grandfather     Heart disease Maternal Grandfather     Hyperlipidemia Maternal Grandfather     Colon cancer Paternal Grandfather     Heart disease Maternal Great-Grandfather     Breast cancer Other 60    Ovarian cancer Neg Hx     Uterine cancer Neg Hx     Prostate cancer Neg Hx         Social History     Social History Narrative    Not on file       Objective       Review of Systems   Constitutional:  Negative for fatigue, fever, unexpected weight gain and unexpected weight loss.   HENT:   "Negative for trouble swallowing.    Respiratory:  Negative for cough, choking, chest tightness, shortness of breath, wheezing and stridor.    Cardiovascular:  Negative for chest pain, palpitations and leg swelling.   Gastrointestinal:  Positive for abdominal distention, abdominal pain, anal bleeding, constipation, diarrhea, nausea and rectal pain. Negative for blood in stool, vomiting, GERD and indigestion.        Vital Signs:   /54 (BP Location: Right arm, Patient Position: Sitting, Cuff Size: Adult)   Pulse 60   Ht 162.6 cm (64\")   Wt 119 kg (261 lb 9.6 oz)   BMI 44.90 kg/m²       Physical Exam  Constitutional:       General: She is not in acute distress.     Appearance: She is well-developed. She is not ill-appearing.   HENT:      Head: Normocephalic.   Eyes:      Pupils: Pupils are equal, round, and reactive to light.   Cardiovascular:      Rate and Rhythm: Normal rate and regular rhythm.      Heart sounds: Normal heart sounds.   Pulmonary:      Effort: Pulmonary effort is normal.      Breath sounds: Normal breath sounds.   Abdominal:      General: Bowel sounds are normal. There is distension.      Palpations: Abdomen is soft. There is no mass.      Tenderness: There is no abdominal tenderness. There is no guarding or rebound.      Hernia: No hernia is present.   Musculoskeletal:         General: Normal range of motion.   Skin:     General: Skin is warm and dry.   Neurological:      Mental Status: She is alert and oriented to person, place, and time.   Psychiatric:         Speech: Speech normal.         Behavior: Behavior normal.         Judgment: Judgment normal.           Assessment & Plan          Assessment and Plan    Diagnoses and all orders for this visit:    1. Periumbilical abdominal pain (Primary)  -     dicyclomine (BENTYL) 10 MG capsule; Take 2 capsules by mouth 3 (Three) Times a Day As Needed for Abdominal Cramping.  Dispense: 120 capsule; Refill: 1    2. Gastroesophageal reflux disease, " unspecified whether esophagitis present    3. Rectal bleeding    4. History of colon polyps    Reviewed medical history with her today.  Sounds like her IBS-mixed is not well-controlled.  Will start her on bentyl for the pain and cramping.  Continue a high-fiber diet.  Continue to avoid the supplements that she thinks was contributing to the diarrhea.  GERD seems well-controlled on current regimen.  Continue GERD precautions.  For the rectal bleeding which I suspect could be hemorrhoidal in nature we will refer to Dr. Webb for further evaluation.  Patient is agreeable to the referral.  Patient to call the office in 2 weeks with an update.  Patient to follow-up with me in 3 months.  Patient is agreeable to the plan.            Follow Up       Follow Up   Return in about 3 months (around 8/14/2024) for GERD, IBS.  Patient was given instructions and counseling regarding her condition or for health maintenance advice. Please see specific information pulled into the AVS if appropriate.

## 2024-05-14 NOTE — PATIENT INSTRUCTIONS
Start bentyl (dicyclomine) for pain and cramping     Continue reflux meds     Refer to Dr. Webb for rectal bleeding     Give me update in 1-2 weeks (MYCHART Or call the office)

## 2024-05-14 NOTE — PROGRESS NOTES
"GYN Problem/Follow Up Visit    Chief Complaint   Patient presents with    ABNORMAL BLEEDING           HPI  Kinsey Suresh is a 29 y.o. female, , who presents for aub and pelvic pain. States had menses in October and then nothing until march when she started having aub. Would bleed for a week to a week and a half and then only have 2-3 days off before starting again. States it was heavy and she was changing products q2h. Was also having a lot of generalized cramping. No bleeding for two weeks currently but still having some cramping. Also c/o vaginal burning and irritation. No new sex partners.        Additional OB/GYN History   No LMP recorded (lmp unknown).  Current contraception: contraceptive methods: None  Desires to: do not start contraception  Allergies : Ciprofloxacin, Doxycycline hyclate, Doxycycline monohydrate, Latex, Sulfa antibiotics, Sulfamethoxazole-trimethoprim, and Trimethoprim     The additional following portions of the patient's history were reviewed and updated as appropriate: allergies, current medications, past family history, past medical history, past social history, past surgical history, and problem list.    Review of Systems    I have reviewed and agree with the HPI, ROS, and historical information as entered above. Felicita Edward, APRN    Objective   /74   Pulse 88   Ht 162.6 cm (64.02\")   Wt 118 kg (260 lb)   LMP  (LMP Unknown)   BMI 44.60 kg/m²     Physical Exam  Vitals reviewed.   Genitourinary:     General: Normal vulva.      Vagina: No signs of injury and foreign body. Vaginal discharge (thin white), erythema and tenderness present. No bleeding, lesions or prolapsed vaginal walls.      Cervix: Discharge, friability and erythema present. No cervical motion tenderness or lesion.      Uterus: Tender.       Adnexa:         Right: Tenderness present.         Left: Tenderness present.    Skin:     General: Skin is warm and dry.   Neurological:      Mental Status: She is " alert and oriented to person, place, and time.            Assessment and Plan    Diagnoses and all orders for this visit:    1. Abnormal uterine bleeding (Primary)  -     US Non-ob Transvaginal; Future  -     IGP,rfx Aptima HPV All Pth  -     Prolactin  -     TSH  -     T4, Free  -     NuSwab VG+ - Swab, Vagina    2. Pelvic pain in female  -     US Non-ob Transvaginal; Future  -     IGP,rfx Aptima HPV All Pth  -     Prolactin  -     TSH  -     T4, Free  -     NuSwab VG+ - Swab, Vagina    Urine preg test negative. Will check for infections. Will check labs and pelvic u/s. F/u after u/s, sooner for any concerns.     Counseling:  She understands the importance of having the above orders performed in a timely fashion.  She is encouraged to review her results online and/or contact or office if she has questions.     Follow Up:  Return for lab and u/s f/u.      MICHEAL Saleem  05/14/2024

## 2024-05-15 LAB
T4 FREE SERPL-MCNC: 1.23 NG/DL (ref 0.93–1.7)
TSH SERPL DL<=0.05 MIU/L-ACNC: 2.04 UIU/ML (ref 0.27–4.2)

## 2024-05-16 LAB
A VAGINAE DNA VAG QL NAA+PROBE: NORMAL SCORE
BVAB2 DNA VAG QL NAA+PROBE: NORMAL SCORE
C ALBICANS DNA VAG QL NAA+PROBE: NEGATIVE
C GLABRATA DNA VAG QL NAA+PROBE: NEGATIVE
C TRACH DNA VAG QL NAA+PROBE: NEGATIVE
MEGA1 DNA VAG QL NAA+PROBE: NORMAL SCORE
N GONORRHOEA DNA VAG QL NAA+PROBE: NEGATIVE
T VAGINALIS DNA VAG QL NAA+PROBE: NEGATIVE

## 2024-05-17 ENCOUNTER — TELEPHONE (OUTPATIENT)
Dept: FAMILY MEDICINE CLINIC | Facility: CLINIC | Age: 29
End: 2024-05-17
Payer: COMMERCIAL

## 2024-05-17 NOTE — TELEPHONE ENCOUNTER
Pt received a letter of diagnosis through her [employer]. It was submitted for a job on [employer].  The workplace is requiring more info in the letter, to include What her workplace limitations are (as far as self care, self direction, work skills, work tolerance, communication, and mobility.)     They want explained in more detail about what her work accommodations should be. (What specifically do they need in order to accommodate the depressed mood, how many breaks she would need to take, etc.)    Please send new letter through [employer]

## 2024-05-17 NOTE — TELEPHONE ENCOUNTER
In that case, we can address this with patient at her upcoming visit with me in just 1 week.  She can tell me exactly the things that she wants me to say in the letter at that time.      This document has been electronically signed by Andrea Rollins MD on May 17, 2024 15:23 EDT     S/p LIANET.  Pt tachypneic and remains on 3L NC.  Dr. Quintana (anes) and Dr. Liriano aware.  Per Dr. Quintana, keep pt on NC.  VS as noted on flowsheet.  Pt sleepy, but easily arousable.  Will continue to monitor.

## 2024-05-20 ENCOUNTER — PATIENT MESSAGE (OUTPATIENT)
Dept: FAMILY MEDICINE CLINIC | Facility: CLINIC | Age: 29
End: 2024-05-20
Payer: COMMERCIAL

## 2024-05-20 LAB
CONV .: NORMAL
CYTOLOGIST CVX/VAG CYTO: NORMAL
CYTOLOGY CVX/VAG DOC CYTO: NORMAL
CYTOLOGY CVX/VAG DOC THIN PREP: NORMAL
DX ICD CODE: NORMAL
Lab: NORMAL
Lab: NORMAL
OTHER STN SPEC: NORMAL
STAT OF ADQ CVX/VAG CYTO-IMP: NORMAL

## 2024-05-21 NOTE — TELEPHONE ENCOUNTER
From: Zeinab ARROYO  To: Kinsey Suresh  Sent: 5/20/2024 8:36 AM EDT  Subject: PAPERWORK    GOOD MORNING PER DR. COVARRUBIAS HE WILL GO OVER YOUR PAPER WORK WITH YOU AT YOUR NEXT OFFICE VISIT.  
Normal rate, regular rhythm.  Heart sounds S1, S2.

## 2024-05-23 ENCOUNTER — TELEPHONE (OUTPATIENT)
Dept: OBSTETRICS AND GYNECOLOGY | Facility: CLINIC | Age: 29
End: 2024-05-23
Payer: COMMERCIAL

## 2024-05-23 ENCOUNTER — OFFICE VISIT (OUTPATIENT)
Dept: FAMILY MEDICINE CLINIC | Facility: CLINIC | Age: 29
End: 2024-05-23
Payer: COMMERCIAL

## 2024-05-23 VITALS
DIASTOLIC BLOOD PRESSURE: 69 MMHG | SYSTOLIC BLOOD PRESSURE: 119 MMHG | OXYGEN SATURATION: 98 % | BODY MASS INDEX: 43.71 KG/M2 | WEIGHT: 256 LBS | HEIGHT: 64 IN | HEART RATE: 94 BPM

## 2024-05-23 DIAGNOSIS — F41.9 ANXIETY AND DEPRESSION: Primary | ICD-10-CM

## 2024-05-23 DIAGNOSIS — F32.A ANXIETY AND DEPRESSION: Primary | ICD-10-CM

## 2024-05-23 DIAGNOSIS — L65.9 HAIR LOSS: ICD-10-CM

## 2024-05-23 PROCEDURE — 99213 OFFICE O/P EST LOW 20 MIN: CPT | Performed by: STUDENT IN AN ORGANIZED HEALTH CARE EDUCATION/TRAINING PROGRAM

## 2024-05-23 RX ORDER — VENLAFAXINE 75 MG/1
75 TABLET ORAL DAILY
Qty: 90 TABLET | Refills: 0 | Status: SHIPPED | OUTPATIENT
Start: 2024-05-23

## 2024-05-23 NOTE — TELEPHONE ENCOUNTER
Caller: Kinsey Suresh    Relationship to patient: Self    Best call back number: [phone]    WOULD LIKE TO DISCUSS LABS RESULTS FROM 05-.

## 2024-05-23 NOTE — TELEPHONE ENCOUNTER
PATIENT RETURNED MISSED CALL. HUB UNABLE TO WARM TRANSFER. PLEASE CALL [phone] ANYTIME. IT IS OKAY TO [facility].- HUB

## 2024-05-23 NOTE — PROGRESS NOTES
Subjective:       Kinsey Suresh is a 29 y.o. female with a concurrent medical history of obesity, hypertension, gastroesophageal reflux disease, and depression who presents to [facility] care.    At last visit on 4/2/2024 we discussed uncontrolled gastroesophageal reflux disease with omeprazole and Pepcid.  It appears she is also on Carafate as well.  I have referred her to GI and she says her symptoms are now well-controlled.      At previous visit on 2/23/2024, we discussed anxiety and depression.  She scored initial PHQ score of 19.  She had been on many medications at that time in the past that included Lexapro and Zoloft.  We started venlafaxine at the 37.5 mg dose.  She is tolerated this well without side effects.  She scored an 18 on the PHQ-9 today, and improvement by 1 point from her previous value of 19 at last visit.  However, some symptoms may be worsened by the death of a friend.  Will increase venlafaxine dose from 37.5 mg to 75 mg.  Would see back in 6 weeks to assess response to therapy.      The following portions of the patient's history were reviewed and updated as appropriate: allergies, current medications, past family history, past medical history, past social history, past surgical history, and problem list.    Past Medical Hx:  Past Medical History:   Diagnosis Date    Anxiety     Asthma     PRN INHALERS    Borderline diabetic     NO MEDICATIONS AS OF YET TRYING CHANGE OF DIET- DOESN'T CHECK BG AT HOME    Clostridium difficile diarrhea 01/30/2022    DENIES ANY CURRENT ISSUES    Depression     GERD (gastroesophageal reflux disease)     HL (hearing loss)     Hypertension     Irritable bowel syndrome     Kidney stones     Migraine without aura     Obesity     Ovarian cyst     right ovary     PONV (postoperative nausea and vomiting)     PVC (premature ventricular contraction)     FOLLOWED BY DR FIGUEROA HAS SCHEDULED ECHO ON 7/3/23 . REPORTS ISSUES WITH UPPER LEFT CHEST PAIN BUT STATES  IS SORE WITH PALPATION ALSO REPORTS SOA WITH EXERTION. ACTIVE WORKS FULL TIME    Right ventricular cardiac abnormality     DILATATION PER DR FIGUEROA NOTE - DENIED CP/SOB    Seasonal allergies     Sleep apnea     DOES NOT USE CPAP    Urinary tract infection     NO CURRENT ISSUES       Past Surgical Hx:  Past Surgical History:   Procedure Laterality Date    BILATERAL BREAST REDUCTION Bilateral 7/11/2023    Procedure: BREAST REDUCTION BILATERAL EXCISION OF LEFT UPPER CHEST MOLE;  Surgeon: Mariah Lieberman MD;  Location: [facility] OR [facility] [place];  Service: Plastics;  Laterality: Bilateral;    COLONOSCOPY  2020    COLONOSCOPY N/A 7/11/2022    Procedure: COLONOSCOPY BIOPSY;  Surgeon: David Yip MD;  Location: [facility] ENDOSCOPY;  Service: Gastroenterology;  Laterality: N/A;  NORMAL COLON    ENDOSCOPY      EXCISION LESION Left 7/11/2023    Procedure: BREAST REDUCTION BILATERAL EXCISION OF LEFT UPPER CHEST MOLE;  Surgeon: Mariah Lieberman MD;  Location: [facility] OR [facility] [place];  Service: Plastics;  Laterality: Left;    KIDNEY STONE SURGERY  11/2021    URETEROSCOPY LASER LITHOTRIPSY WITH STENT INSERTION Left 11/13/2021    Procedure: URETEROSCOPY LASER LITHOTRIPSY WITH STENT INSERTION STONE BASKET EXTRACTION;  Surgeon: Colt Sanders MD;  Location: [facility] MAIN OR;  Service: Urology;  Laterality: Left;    URETEROSCOPY LASER LITHOTRIPSY WITH STENT INSERTION Right 8/18/2023    Procedure: CYSTOSCOPY URETEROSCOPY RETROGRADE PYELOGRAM HOLMIUM LASER STENT INSERTION, right;  Surgeon: Bailee Edgar MD;  Location: [facility] MAIN OR;  Service: Urology;  Laterality: Right;       Current Meds:    Current Outpatient Medications:     albuterol sulfate  (90 Base) MCG/ACT inhaler, INHALE 2 PUFFS INTO THE LUNGS EVERY 4 (FOUR) HOURS AS NEEDED FOR SHORTNESS OF AIR., Disp: , Rfl:     bisoprolol (ZEBeta) 5 MG tablet, Take 0.5 tablets by mouth Daily., Disp: , Rfl:     dicyclomine (BENTYL) 10 MG capsule, Take 2 capsules by mouth 3  (Three) Times a Day As Needed for Abdominal Cramping., Disp: 120 capsule, Rfl: 1    famotidine (Pepcid) 20 MG tablet, Take 1 tablet by mouth 2 (Two) Times a Day., Disp: 60 tablet, Rfl: 3    omeprazole (priLOSEC) 40 MG capsule, Take 1 capsule by mouth Daily., Disp: 90 capsule, Rfl: 1    ondansetron ODT (ZOFRAN-ODT) 4 MG disintegrating tablet, Place 1 tablet on the tongue Every 6 (Six) Hours As Needed for Vomiting., Disp: 15 tablet, Rfl: 0    sucralfate (Carafate) 1 g tablet, Take 1 tablet by mouth 3 (Three) Times a Day., Disp: 90 tablet, Rfl: 1    venlafaxine (EFFEXOR) 75 MG tablet, Take 1 tablet by mouth Daily., Disp: 90 tablet, Rfl: 0    Allergies:  Allergies   Allergen Reactions    Ciprofloxacin Rash    Doxycycline Hyclate Rash    Doxycycline Monohydrate Rash    Latex Rash    Sulfa Antibiotics Rash    Sulfamethoxazole-Trimethoprim Hives and Rash    Trimethoprim Rash       Family Hx:  Family History   Problem Relation Age of Onset    Heart failure Mother     Early death Mother     Asthma Sister     Depression Sister     Vision loss Sister     Other Sister     Cancer Maternal Aunt     Skin cancer Maternal Grandmother     Anxiety disorder Maternal Grandmother     Colon cancer Maternal Grandfather     Heart failure Maternal Grandfather     Diabetes Maternal Grandfather     Heart disease Maternal Grandfather     Hyperlipidemia Maternal Grandfather     Colon cancer Paternal Grandfather     Heart disease Maternal Great-Grandfather     Breast cancer Other 60    Ovarian cancer Neg Hx     Uterine cancer Neg Hx     Prostate cancer Neg Hx         Social History:  Social History     Socioeconomic History    Marital status: Single   Tobacco Use    Smoking status: Never     Passive exposure: Yes    Smokeless tobacco: Never    Tobacco comments:     secondhand smoke exposure   Vaping Use    Vaping status: Never Used    Passive vaping exposure: Yes   Substance and Sexual Activity    Alcohol use: Never    Drug use: Never    Sexual  "activity: Not Currently     Partners: Male     Birth control/protection: None       Review of Systems  Review of Systems   Psychiatric/Behavioral:  Positive for dysphoric mood.        Objective:     /69   Pulse 94   Ht 162.6 cm (64\")   Wt 116 kg (256 lb)   LMP  (LMP Unknown)   SpO2 98%   BMI 43.94 kg/m²   Physical Exam  Constitutional:       General: She is not in acute distress.     Appearance: Normal appearance. She is obese. She is not ill-appearing, toxic-appearing or diaphoretic.   Pulmonary:      Effort: Pulmonary effort is normal. No respiratory distress.   Neurological:      Mental Status: She is alert.          Assessment/Plan:     Diagnoses and all orders for this visit:    1. Anxiety and depression (Primary)    At previous visit on 2/23/2024, we discussed anxiety and depression.  She scored initial PHQ score of 19.  She had been on many medications at that time in the past that included Lexapro and Zoloft.  We started venlafaxine at the 37.5 mg dose.  She is tolerated this well without side effects.  She scored an 18 on the PHQ-9 today, and improvement by 1 point from her previous value of 19 at last visit.  However, some symptoms may be worsened by the death of a friend.  Will increase venlafaxine dose from 37.5 mg to 75 mg.  Would see back in 6 weeks to assess response to therapy.  She already sees a counselor and so we will defer referral to psychiatry at this time    -     venlafaxine (EFFEXOR) 75 MG tablet; Take 1 tablet by mouth Daily.  Dispense: 90 tablet; Refill: 0    2. Hair loss    She has noted hair thinning and request referral to dermatology.  Will place this for her at her request.    -     Ambulatory Referral to Dermatology          Rx changes: Increasing venlafaxine to 75 mg today      Follow-up:     Return in about 6 weeks (around 7/4/2024) for Anxiety and Depression .    Preventative:  Health Maintenance   Topic Date Due    URINE MICROALBUMIN  Never done    BMI FOLLOWUP  " Never done    Pneumococcal Vaccine 0-64 (1 of 2 - PCV) Never done    ANNUAL PHYSICAL  Never done    DIABETIC FOOT EXAM  Never done    COVID-19 Vaccine (3 - 2023-24 season) 05/24/2024 (Originally 9/1/2023)    INFLUENZA VACCINE  08/01/2024    DIABETIC EYE EXAM  08/04/2024    HEMOGLOBIN A1C  10/02/2024    PAP SMEAR  05/14/2027    COLORECTAL CANCER SCREENING  07/11/2027    TDAP/TD VACCINES (3 - Td or Tdap) 09/30/2030    HEPATITIS C SCREENING  Completed    Hepatitis B  Completed    CHLAMYDIA SCREENING  Discontinued         This document has been electronically signed by Andrea Rollins MD on May 23, 2024 11:27 EDT       Parts of this note are electronic transcriptions/translations of spoken language to printed text using the Dragon Dictation system.

## 2024-05-24 ENCOUNTER — HOSPITAL ENCOUNTER (OUTPATIENT)
Dept: ULTRASOUND IMAGING | Facility: HOSPITAL | Age: 29
Discharge: HOME | End: 2024-05-24
Admitting: OBSTETRICS & GYNECOLOGY
Payer: COMMERCIAL

## 2024-05-24 DIAGNOSIS — N93.9 ABNORMAL UTERINE BLEEDING: ICD-10-CM

## 2024-05-24 DIAGNOSIS — R10.2 PELVIC PAIN IN FEMALE: ICD-10-CM

## 2024-05-24 PROCEDURE — 76830 TRANSVAGINAL US NON-OB: CPT

## 2024-06-18 ENCOUNTER — OFFICE VISIT (OUTPATIENT)
Dept: OBSTETRICS AND GYNECOLOGY | Facility: CLINIC | Age: 29
End: 2024-06-18
Payer: COMMERCIAL

## 2024-06-18 VITALS
WEIGHT: 244 LBS | HEIGHT: 64 IN | DIASTOLIC BLOOD PRESSURE: 74 MMHG | SYSTOLIC BLOOD PRESSURE: 123 MMHG | BODY MASS INDEX: 41.66 KG/M2 | HEART RATE: 80 BPM

## 2024-06-18 DIAGNOSIS — N93.9 ABNORMAL UTERINE BLEEDING: Primary | ICD-10-CM

## 2024-06-18 DIAGNOSIS — Z30.011 ENCOUNTER FOR INITIAL PRESCRIPTION OF CONTRACEPTIVE PILLS: ICD-10-CM

## 2024-06-18 PROCEDURE — 99213 OFFICE O/P EST LOW 20 MIN: CPT | Performed by: OBSTETRICS & GYNECOLOGY

## 2024-06-18 RX ORDER — ACETAMINOPHEN AND CODEINE PHOSPHATE 120; 12 MG/5ML; MG/5ML
1 SOLUTION ORAL DAILY
Qty: 84 TABLET | Refills: 4 | Status: SHIPPED | OUTPATIENT
Start: 2024-06-18

## 2024-06-18 NOTE — PROGRESS NOTES
"GYN Problem/Follow Up Visit    Chief Complaint   Patient presents with    Follow-up     FOLLOW UP LABS AND ULTRASOUND           HPI  Kinsey Suresh is a 29 y.o. female, , who presents for lab and u/s f/u. Recently seen for skipping menses and then having heavy prolonged bleeding and cramping. See previous note. No new concerns.        Additional OB/GYN History   Patient's last menstrual period was 2024 (approximate).  Current contraception: contraceptive methods: None  Allergies : Ciprofloxacin, Doxycycline hyclate, Doxycycline monohydrate, Latex, Sulfa antibiotics, Sulfamethoxazole-trimethoprim, and Trimethoprim     The additional following portions of the patient's history were reviewed and updated as appropriate: allergies, current medications, past family history, past medical history, past social history, past surgical history, and problem list.    Review of Systems    I have reviewed and agree with the HPI, ROS, and historical information as entered above. Felicita Edward, APRN    Objective   /74   Pulse 80   Ht 162.6 cm (64\")   Wt 111 kg (244 lb)   LMP 2024 (Approximate)   BMI 41.88 kg/m²     Physical Exam  Vitals reviewed.   Neurological:      Mental Status: She is alert and oriented to person, place, and time.            Assessment and Plan    Diagnoses and all orders for this visit:    1. Abnormal uterine bleeding (Primary)  -     norethindrone (MICRONOR) 0.35 MG tablet; Take 1 tablet by mouth Daily.  Dispense: 84 tablet; Refill: 4    2. Encounter for initial prescription of contraceptive pills  -     norethindrone (MICRONOR) 0.35 MG tablet; Take 1 tablet by mouth Daily.  Dispense: 84 tablet; Refill: 4    Reviewed labs from 24: normal except prolactin slightly elevated at 26. Reviewed pelvic u/s from 24: normal. Discussed findings and tx options including r/b/se. Pt asking about ablation but we discussed that she may not be a candidate for that without trying other " options first. She has tried mirena. Desires pill. She will f/u for any concerns.     Counseling:  She understands the importance of having the above orders performed in a timely fashion.  She is encouraged to review her results online and/or contact or office if she has questions.     Follow Up:  Return in about 3 months (around 9/18/2024) for Recheck.      Felicita Edward, MICHEAL  06/18/2024

## 2024-07-08 ENCOUNTER — OFFICE VISIT (OUTPATIENT)
Dept: FAMILY MEDICINE CLINIC | Facility: CLINIC | Age: 29
End: 2024-07-08
Payer: COMMERCIAL

## 2024-07-08 VITALS
SYSTOLIC BLOOD PRESSURE: 113 MMHG | WEIGHT: 239.8 LBS | BODY MASS INDEX: 41.16 KG/M2 | HEART RATE: 72 BPM | DIASTOLIC BLOOD PRESSURE: 74 MMHG | OXYGEN SATURATION: 98 %

## 2024-07-08 DIAGNOSIS — R00.2 PALPITATIONS: ICD-10-CM

## 2024-07-08 DIAGNOSIS — F32.A ANXIETY AND DEPRESSION: Primary | ICD-10-CM

## 2024-07-08 DIAGNOSIS — F41.9 ANXIETY AND DEPRESSION: Primary | ICD-10-CM

## 2024-07-08 PROCEDURE — 3074F SYST BP LT 130 MM HG: CPT | Performed by: STUDENT IN AN ORGANIZED HEALTH CARE EDUCATION/TRAINING PROGRAM

## 2024-07-08 PROCEDURE — 3044F HG A1C LEVEL LT 7.0%: CPT | Performed by: STUDENT IN AN ORGANIZED HEALTH CARE EDUCATION/TRAINING PROGRAM

## 2024-07-08 PROCEDURE — 1125F AMNT PAIN NOTED PAIN PRSNT: CPT | Performed by: STUDENT IN AN ORGANIZED HEALTH CARE EDUCATION/TRAINING PROGRAM

## 2024-07-08 PROCEDURE — 99213 OFFICE O/P EST LOW 20 MIN: CPT | Performed by: STUDENT IN AN ORGANIZED HEALTH CARE EDUCATION/TRAINING PROGRAM

## 2024-07-08 PROCEDURE — 3078F DIAST BP <80 MM HG: CPT | Performed by: STUDENT IN AN ORGANIZED HEALTH CARE EDUCATION/TRAINING PROGRAM

## 2024-07-08 RX ORDER — VENLAFAXINE 75 MG/1
75 TABLET ORAL DAILY
Qty: 90 TABLET | Refills: 3 | Status: SHIPPED | OUTPATIENT
Start: 2024-07-08

## 2024-07-08 RX ORDER — BISOPROLOL FUMARATE 5 MG/1
2.5 TABLET, FILM COATED ORAL DAILY
Qty: 30 TABLET | Refills: 0 | Status: SHIPPED | OUTPATIENT
Start: 2024-07-08

## 2024-07-08 NOTE — PROGRESS NOTES
Subjective:       Kinsey Suresh is a 29 y.o. female with a concurrent medical history of gastroesophageal reflux disease, obesity, hypertension, and depression who follows up to discuss depressed mood.    Kinsey and I discussed depression on 5/23/2024.  She had previously been well-controlled on venlafaxine at 37.5 mg.  Unfortunately life stressors exacerbated her depression.  At last visit she scored a 19 on the PHQ-9 and had worsening symptoms of depression.  We made the joint decision to increase venlafaxine from 37.5 mg to 75 mg.  She is doing very well on this medication.  She denies side effects on the current dose.  PHQ-9 has improved to 4.  Will refill for 1 year today.    She does request for me to refill her beta-blocker for hypertension and palpitations.  Her appointment with cardiology is not until next month.  I will provide her with a 1 month refill at her request.    The following portions of the patient's history were reviewed and updated as appropriate: allergies, current medications, past family history, past medical history, past social history, past surgical history, and problem list.    Past Medical Hx:  Past Medical History:   Diagnosis Date    Anxiety     Asthma     PRN INHALERS    Borderline diabetic     NO MEDICATIONS AS OF YET TRYING CHANGE OF DIET- DOESN'T CHECK BG AT HOME    Clostridium difficile diarrhea 01/30/2022    DENIES ANY CURRENT ISSUES    Depression     GERD (gastroesophageal reflux disease)     HL (hearing loss)     Hypertension     Irritable bowel syndrome     Kidney stones     Migraine without aura     Obesity     Ovarian cyst     right ovary     PONV (postoperative nausea and vomiting)     PVC (premature ventricular contraction)     FOLLOWED BY DR FIGUEROA HAS SCHEDULED ECHO ON 7/3/23 . REPORTS ISSUES WITH UPPER LEFT CHEST PAIN BUT STATES IS SORE WITH PALPATION ALSO REPORTS SOA WITH EXERTION. ACTIVE WORKS FULL TIME    Right ventricular cardiac abnormality      DILATATION PER DR FIGUEROA NOTE - DENIED CP/SOB    Seasonal allergies     Sleep apnea     DOES NOT USE CPAP    Urinary tract infection     NO CURRENT ISSUES       Past Surgical Hx:  Past Surgical History:   Procedure Laterality Date    BILATERAL BREAST REDUCTION Bilateral 7/11/2023    Procedure: BREAST REDUCTION BILATERAL EXCISION OF LEFT UPPER CHEST MOLE;  Surgeon: Mariah Lieberman MD;  [place] [facility] [place] [facility] [place]  Service: Plastics;  Laterality: Bilateral;    COLONOSCOPY  2020    COLONOSCOPY N/A 7/11/2022    Procedure: COLONOSCOPY BIOPSY;  Surgeon: David Yip MD;  Location: [facility] ENDOSCOPY;  Service: Gastroenterology;  Laterality: N/A;  NORMAL COLON    ENDOSCOPY      EXCISION LESION Left 7/11/2023    Procedure: BREAST REDUCTION BILATERAL EXCISION OF LEFT UPPER CHEST MOLE;  Surgeon: Mariah Lieberman MD;  [place] [facility] [place] [facility] [place]  Service: Plastics;  Laterality: Left;    KIDNEY STONE SURGERY  11/2021    URETEROSCOPY LASER LITHOTRIPSY WITH STENT INSERTION Left 11/13/2021    Procedure: URETEROSCOPY LASER LITHOTRIPSY WITH STENT INSERTION STONE BASKET EXTRACTION;  Surgeon: Colt Sanders MD;  Location: [facility] MAIN OR;  Service: Urology;  Laterality: Left;    URETEROSCOPY LASER LITHOTRIPSY WITH STENT INSERTION Right 8/18/2023    Procedure: CYSTOSCOPY URETEROSCOPY RETROGRADE PYELOGRAM HOLMIUM LASER STENT INSERTION, right;  Surgeon: Bailee Edgar MD;  Location: [facility] MAIN OR;  Service: Urology;  Laterality: Right;       Current Meds:    Current Outpatient Medications:     albuterol sulfate  (90 Base) MCG/ACT inhaler, INHALE 2 PUFFS INTO THE LUNGS EVERY 4 (FOUR) HOURS AS NEEDED FOR SHORTNESS OF AIR., Disp: , Rfl:     bisoprolol (ZEBeta) 5 MG tablet, Take 0.5 tablets by mouth Daily., Disp: 30 tablet, Rfl: 0    dicyclomine (BENTYL) 10 MG capsule, Take 2 capsules by mouth 3 (Three) Times a Day As Needed for Abdominal Cramping., Disp: 120 capsule, Rfl: 1    famotidine (Pepcid) 20 MG  tablet, Take 1 tablet by mouth 2 (Two) Times a Day., Disp: 60 tablet, Rfl: 3    omeprazole (priLOSEC) 40 MG capsule, Take 1 capsule by mouth Daily., Disp: 90 capsule, Rfl: 1    ondansetron ODT (ZOFRAN-ODT) 4 MG disintegrating tablet, Place 1 tablet on the tongue Every 6 (Six) Hours As Needed for Vomiting., Disp: 15 tablet, Rfl: 0    sucralfate (Carafate) 1 g tablet, Take 1 tablet by mouth 3 (Three) Times a Day., Disp: 90 tablet, Rfl: 1    venlafaxine (EFFEXOR) 75 MG tablet, Take 1 tablet by mouth Daily., Disp: 90 tablet, Rfl: 3    norethindrone (MICRONOR) 0.35 MG tablet, Take 1 tablet by mouth Daily. (Patient not taking: Reported on 7/8/2024), Disp: 84 tablet, Rfl: 4    Allergies:  Allergies   Allergen Reactions    Ciprofloxacin Rash    Doxycycline Hyclate Rash    Doxycycline Monohydrate Rash    Latex Rash    Sulfa Antibiotics Rash    Sulfamethoxazole-Trimethoprim Hives and Rash    Trimethoprim Rash       Family Hx:  Family History   Problem Relation Age of Onset    Heart failure Mother     Early death Mother     Asthma Sister     Depression Sister     Vision loss Sister     Other Sister     Cancer Maternal Aunt     Skin cancer Maternal Grandmother     Anxiety disorder Maternal Grandmother     Colon cancer Maternal Grandfather     Heart failure Maternal Grandfather     Diabetes Maternal Grandfather     Heart disease Maternal Grandfather     Hyperlipidemia Maternal Grandfather     Colon cancer Paternal Grandfather     Heart disease Maternal Great-Grandfather     Breast cancer Other 60    Ovarian cancer Neg Hx     Uterine cancer Neg Hx     Prostate cancer Neg Hx         Social History:  Social History     Socioeconomic History    Marital status: Single   Tobacco Use    Smoking status: Never     Passive exposure: Yes    Smokeless tobacco: Never    Tobacco comments:     secondhand smoke exposure   Vaping Use    Vaping status: Never Used    Passive vaping exposure: Yes   Substance and Sexual Activity    Alcohol use:  Never    Drug use: Never    Sexual activity: Not Currently     Partners: Male     Birth control/protection: None       Review of Systems  Review of Systems   Constitutional:  Negative for fatigue.   HENT:          -Denies dry mouth   Psychiatric/Behavioral:  Positive for dysphoric mood (improved).        Objective:     /74 (BP Location: Right arm, Patient Position: Sitting, Cuff Size: Adult)   Pulse 72   Wt 109 kg (239 lb 12.8 oz)   LMP 05/28/2024 (Approximate)   SpO2 98%   BMI 41.16 kg/m²   Physical Exam  Constitutional:       General: She is not in acute distress.     Appearance: Normal appearance. She is obese. She is not ill-appearing, toxic-appearing or diaphoretic.   Pulmonary:      Effort: Pulmonary effort is normal. No respiratory distress.   Neurological:      Mental Status: She is alert.   Psychiatric:         Mood and Affect: Mood normal.         Behavior: Behavior normal.          Assessment/Plan:     Diagnoses and all orders for this visit:    1. Anxiety and depression (Primary)    Kinsey and LEDY discussed depression on 5/23/2024.  She had previously been well-controlled on venlafaxine at 37.5 mg.  Unfortunately life stressors exacerbated her depression.  At last visit she scored a 19 on the PHQ-9 and had worsening symptoms of depression.  We made the joint decision to increase venlafaxine from 37.5 mg to 75 mg.  She is doing very well on this medication.  She denies side effects on the current dose.  PHQ-9 has improved to 4.  Will refill for 1 year today.    -     venlafaxine (EFFEXOR) 75 MG tablet; Take 1 tablet by mouth Daily.  Dispense: 90 tablet; Refill: 3    2. Palpitations    She does request for me to refill her beta-blocker for hypertension and palpitations.  Her appointment with cardiology is not until next month.  I will provide her with a 1 month refill at her request.    -     bisoprolol (ZEBeta) 5 MG tablet; Take 0.5 tablets by mouth Daily.  Dispense: 30 tablet; Refill:  0          Rx changes: None      Follow-up:     Return in about 30 weeks (around 2/3/2025) for Annual physical.    Preventative:  Health Maintenance   Topic Date Due    URINE MICROALBUMIN  Never done    BMI FOLLOWUP  Never done    Pneumococcal Vaccine 0-64 (1 of 2 - PCV) Never done    ANNUAL PHYSICAL  Never done    DIABETIC FOOT EXAM  Never done    COVID-19 Vaccine (3 - 2023-24 season) 09/01/2023    DIABETIC EYE EXAM  08/04/2024    HEMOGLOBIN A1C  10/02/2024    INFLUENZA VACCINE  08/01/2024    PAP SMEAR  05/14/2027    COLORECTAL CANCER SCREENING  07/11/2027    TDAP/TD VACCINES (3 - Td or Tdap) 09/30/2030    HEPATITIS C SCREENING  Completed    Hepatitis B  Completed    CHLAMYDIA SCREENING  Discontinued         This document has been electronically signed by Andrea Rollins MD on July 8, 2024 12:31 EDT       Parts of this note are electronic transcriptions/translations of spoken language to printed text using the Dragon Dictation system.

## 2024-08-06 ENCOUNTER — OFFICE VISIT (OUTPATIENT)
Dept: FAMILY MEDICINE CLINIC | Facility: CLINIC | Age: 29
End: 2024-08-06
Payer: COMMERCIAL

## 2024-08-06 VITALS
HEART RATE: 62 BPM | SYSTOLIC BLOOD PRESSURE: 120 MMHG | WEIGHT: 226 LBS | HEIGHT: 64 IN | DIASTOLIC BLOOD PRESSURE: 56 MMHG | OXYGEN SATURATION: 97 % | BODY MASS INDEX: 38.58 KG/M2

## 2024-08-06 DIAGNOSIS — L65.9 HAIR LOSS: Primary | ICD-10-CM

## 2024-08-06 DIAGNOSIS — M25.539 PAIN IN WRIST, UNSPECIFIED LATERALITY: ICD-10-CM

## 2024-08-06 PROCEDURE — 3044F HG A1C LEVEL LT 7.0%: CPT | Performed by: STUDENT IN AN ORGANIZED HEALTH CARE EDUCATION/TRAINING PROGRAM

## 2024-08-06 PROCEDURE — 3078F DIAST BP <80 MM HG: CPT | Performed by: STUDENT IN AN ORGANIZED HEALTH CARE EDUCATION/TRAINING PROGRAM

## 2024-08-06 PROCEDURE — 1125F AMNT PAIN NOTED PAIN PRSNT: CPT | Performed by: STUDENT IN AN ORGANIZED HEALTH CARE EDUCATION/TRAINING PROGRAM

## 2024-08-06 PROCEDURE — 3074F SYST BP LT 130 MM HG: CPT | Performed by: STUDENT IN AN ORGANIZED HEALTH CARE EDUCATION/TRAINING PROGRAM

## 2024-08-06 PROCEDURE — 99213 OFFICE O/P EST LOW 20 MIN: CPT | Performed by: STUDENT IN AN ORGANIZED HEALTH CARE EDUCATION/TRAINING PROGRAM

## 2024-08-06 NOTE — PROGRESS NOTES
Subjective:       Kinsey Suresh is a 29 y.o. female who presents for hair loss and right wrist pain.    Ms. Suresh reports right wrist pain.  Associated symptoms include numbness and tingling.  Phalen test performed by medical student Latrell Pastor and was positive.  History and physical exam findings most consistent with carpal tunnel.  We offered option for wrist splint and steroids and she elected wrist splint and we would order EMG to further characterize and determine whether or not she needs a referral to orthopedic surgery.    Ms. Suresh also reports alopecia.  This is of several years duration.  Physical exam shows bald spots along temples.  Will refer to dermatology        The following portions of the patient's history were reviewed and updated as appropriate: allergies, current medications, past family history, past medical history, past social history, past surgical history, and problem list.    Past Medical Hx:  Past Medical History:   Diagnosis Date    Anxiety     Asthma     PRN INHALERS    Borderline diabetic     NO MEDICATIONS AS OF YET TRYING CHANGE OF DIET- DOESN'T CHECK BG AT HOME    Clostridium difficile diarrhea 01/30/2022    DENIES ANY CURRENT ISSUES    Depression     GERD (gastroesophageal reflux disease)     HL (hearing loss)     Hypertension     Irritable bowel syndrome     Kidney stones     Migraine without aura     Obesity     Ovarian cyst     right ovary     PONV (postoperative nausea and vomiting)     PVC (premature ventricular contraction)     FOLLOWED BY DR FIGUEROA HAS SCHEDULED ECHO ON 7/3/23 . REPORTS ISSUES WITH UPPER LEFT CHEST PAIN BUT STATES IS SORE WITH PALPATION ALSO REPORTS SOA WITH EXERTION. ACTIVE WORKS FULL TIME    Right ventricular cardiac abnormality     DILATATION PER DR FIGUEROA NOTE - DENIED CP/SOB    Seasonal allergies     Sleep apnea     DOES NOT USE CPAP    Urinary tract infection     NO CURRENT ISSUES       Past Surgical Hx:  Past Surgical History:    Procedure Laterality Date    BILATERAL BREAST REDUCTION Bilateral 7/11/2023    Procedure: BREAST REDUCTION BILATERAL EXCISION OF LEFT UPPER CHEST MOLE;  Surgeon: Mariah Lieberman MD;  Location: [facility];  Service: Plastics;  Laterality: Bilateral;    COLONOSCOPY  2020    COLONOSCOPY N/A 7/11/2022    Procedure: COLONOSCOPY BIOPSY;  Surgeon: David Yip MD;  Location: [facility] ENDOSCOPY;  Service: Gastroenterology;  Laterality: N/A;  NORMAL COLON    ENDOSCOPY      EXCISION LESION Left 7/11/2023    Procedure: BREAST REDUCTION BILATERAL EXCISION OF LEFT UPPER CHEST MOLE;  Surgeon: Mariah Lieberman MD;  Location: [facility];  Service: Plastics;  Laterality: Left;    KIDNEY STONE SURGERY  11/2021    URETEROSCOPY LASER LITHOTRIPSY WITH STENT INSERTION Left 11/13/2021    Procedure: URETEROSCOPY LASER LITHOTRIPSY WITH STENT INSERTION STONE BASKET EXTRACTION;  Surgeon: Colt Sanders MD;  Location: [facility] MAIN OR;  Service: Urology;  Laterality: Left;    URETEROSCOPY LASER LITHOTRIPSY WITH STENT INSERTION Right 8/18/2023    Procedure: CYSTOSCOPY URETEROSCOPY RETROGRADE PYELOGRAM HOLMIUM LASER STENT INSERTION, right;  Surgeon: Bailee Edgar MD;  Location: [facility] MAIN OR;  Service: Urology;  Laterality: Right;       Current Meds:    Current Outpatient Medications:     albuterol sulfate  (90 Base) MCG/ACT inhaler, INHALE 2 PUFFS INTO THE LUNGS EVERY 4 (FOUR) HOURS AS NEEDED FOR SHORTNESS OF AIR., Disp: , Rfl:     bisoprolol (ZEBeta) 5 MG tablet, Take 0.5 tablets by mouth Daily., Disp: 30 tablet, Rfl: 0    dicyclomine (BENTYL) 10 MG capsule, Take 2 capsules by mouth 3 (Three) Times a Day As Needed for Abdominal Cramping., Disp: 120 capsule, Rfl: 1    famotidine (Pepcid) 20 MG tablet, Take 1 tablet by mouth 2 (Two) Times a Day., Disp: 60 tablet, Rfl: 3    norethindrone (MICRONOR) 0.35 MG tablet, Take 1 tablet by mouth Daily., Disp: 84 tablet, Rfl: 4    omeprazole (priLOSEC) 40 MG  capsule, Take 1 capsule by mouth Daily., Disp: 90 capsule, Rfl: 1    ondansetron ODT (ZOFRAN-ODT) 4 MG disintegrating tablet, Place 1 tablet on the tongue Every 6 (Six) Hours As Needed for Vomiting., Disp: 15 tablet, Rfl: 0    sucralfate (Carafate) 1 g tablet, Take 1 tablet by mouth 3 (Three) Times a Day., Disp: 90 tablet, Rfl: 1    venlafaxine (EFFEXOR) 75 MG tablet, Take 1 tablet by mouth Daily., Disp: 90 tablet, Rfl: 3    Allergies:  Allergies   Allergen Reactions    Ciprofloxacin Rash    Doxycycline Hyclate Rash    Doxycycline Monohydrate Rash    Latex Rash    Sulfa Antibiotics Rash    Sulfamethoxazole-Trimethoprim Hives and Rash    Trimethoprim Rash       Family Hx:  Family History   Problem Relation Age of Onset    Heart failure Mother     Early death Mother     Asthma Sister     Depression Sister     Vision loss Sister     Other Sister     Cancer Maternal Aunt     Skin cancer Maternal Grandmother     Anxiety disorder Maternal Grandmother     Colon cancer Maternal Grandfather     Heart failure Maternal Grandfather     Diabetes Maternal Grandfather     Heart disease Maternal Grandfather     Hyperlipidemia Maternal Grandfather     Colon cancer Paternal Grandfather     Heart disease Maternal Great-Grandfather     Breast cancer Other 60    Ovarian cancer Neg Hx     Uterine cancer Neg Hx     Prostate cancer Neg Hx         Social History:  Social History     Socioeconomic History    Marital status: Single   Tobacco Use    Smoking status: Never     Passive exposure: Yes    Smokeless tobacco: Never    Tobacco comments:     secondhand smoke exposure   Vaping Use    Vaping status: Never Used    Passive vaping exposure: Yes   Substance and Sexual Activity    Alcohol use: Never    Drug use: Never    Sexual activity: Not Currently     Partners: Male     Birth control/protection: None       Review of Systems  Review of Systems   Musculoskeletal:  Positive for arthralgias (wrist pain).   Neurological:  Positive for numbness.  "      Objective:     /56 (BP Location: Right arm, Patient Position: Sitting, Cuff Size: Large Adult)   Pulse 62   Ht 162.6 cm (64\")   Wt 103 kg (226 lb)   LMP 08/02/2024 (Exact Date)   SpO2 97%   BMI 38.79 kg/m²   Physical Exam  Constitutional:       General: She is not in acute distress.     Appearance: Normal appearance. She is obese. She is not ill-appearing, toxic-appearing or diaphoretic.   Pulmonary:      Effort: Pulmonary effort is normal. No respiratory distress.   Neurological:      Mental Status: She is alert.          Assessment/Plan:     Diagnoses and all orders for this visit:    1. Hair loss (Primary)      Ms. Suresh also reports alopecia.  This is of several years duration.  Physical exam shows bald spots along temples.  Will refer to dermatology  -     Ambulatory Referral to Dermatology    2. Pain in wrist, unspecified laterality        Ms. Suresh reports right wrist pain.  Associated symptoms include numbness and tingling.  Phalen test performed by medical student Latrell Pastor and was positive.  History and physical exam findings most consistent with carpal tunnel.  We offered option for wrist splint and steroids and she elected wrist splint and we would order EMG to further characterize and determine whether or not she needs a referral to orthopedic surgery.    -     EMG & Nerve Conduction Test; Future          Rx changes: None    Follow-up:     Return if symptoms worsen or fail to improve, for Next scheduled follow up.    Preventative:  Health Maintenance   Topic Date Due    URINE MICROALBUMIN  Never done    BMI FOLLOWUP  Never done    ANNUAL PHYSICAL  Never done    DIABETIC FOOT EXAM  Never done    DIABETIC EYE EXAM  08/04/2024    INFLUENZA VACCINE  08/01/2024    HEMOGLOBIN A1C  10/02/2024    COVID-19 Vaccine (3 - 2023-24 season) 08/07/2024 (Originally 9/1/2023)    Pneumococcal Vaccine 0-64 (1 of 2 - PCV) 08/07/2024 (Originally 4/10/2003)    PAP SMEAR  05/14/2027    COLORECTAL CANCER " SCREENING  07/11/2027    TDAP/TD VACCINES (3 - Td or Tdap) 09/30/2030    HEPATITIS C SCREENING  Completed    Hepatitis B  Completed    CHLAMYDIA SCREENING  Discontinued           This document has been electronically signed by Andrea Rollins MD on August 6, 2024 13:06 EDT       Parts of this note are electronic transcriptions/translations of spoken language to printed text using the Dragon Dictation system.

## 2024-08-09 ENCOUNTER — TELEPHONE (OUTPATIENT)
Dept: FAMILY MEDICINE CLINIC | Facility: CLINIC | Age: 29
End: 2024-08-09
Payer: COMMERCIAL

## 2024-08-09 NOTE — TELEPHONE ENCOUNTER
"I have sent letter in the chart and added the restrictions contained in this message which says \"she feels like and only white things [down] and vacuum.\"  This letter has been sent via [employer].    Thank you,    Andrea Rollins      This document has been electronically signed by Andrea Rollins MD on August 9, 2024 13:54 EDT    "

## 2024-08-09 NOTE — TELEPHONE ENCOUNTER
Patient came in stating Dr. Rollins wanted her to get an arm brace. She went back to work doing normal activities with the brace and she feels like that made things worst. She was wondering if she could get a note from Dr. Rollins stating her limitations. She feels like she can only wipe things done and vacuum.

## 2024-08-12 ENCOUNTER — APPOINTMENT (OUTPATIENT)
Dept: CT IMAGING | Facility: HOSPITAL | Age: 29
End: 2024-08-12
Payer: COMMERCIAL

## 2024-08-12 ENCOUNTER — TELEPHONE (OUTPATIENT)
Dept: FAMILY MEDICINE CLINIC | Facility: CLINIC | Age: 29
End: 2024-08-12

## 2024-08-12 ENCOUNTER — HOSPITAL ENCOUNTER (EMERGENCY)
Facility: HOSPITAL | Age: 29
Discharge: LEFT BEFORE DISCHARGE | End: 2024-08-13
Payer: COMMERCIAL

## 2024-08-12 VITALS
TEMPERATURE: 97.7 F | BODY MASS INDEX: 38.2 KG/M2 | DIASTOLIC BLOOD PRESSURE: 71 MMHG | RESPIRATION RATE: 20 BRPM | SYSTOLIC BLOOD PRESSURE: 130 MMHG | WEIGHT: 223.77 LBS | OXYGEN SATURATION: 100 % | HEIGHT: 64 IN | HEART RATE: 98 BPM

## 2024-08-12 DIAGNOSIS — Z53.21 ELOPED FROM EMERGENCY DEPARTMENT: Primary | ICD-10-CM

## 2024-08-12 LAB
ALBUMIN SERPL-MCNC: 4.3 G/DL (ref 3.5–5.2)
ALBUMIN/GLOB SERPL: 1.5 G/DL
ALP SERPL-CCNC: 104 U/L (ref 39–117)
ALT SERPL W P-5'-P-CCNC: 18 U/L (ref 1–33)
ANION GAP SERPL CALCULATED.3IONS-SCNC: 12.6 MMOL/L (ref 5–15)
AST SERPL-CCNC: 18 U/L (ref 1–32)
BASOPHILS # BLD AUTO: 0.07 10*3/MM3 (ref 0–0.2)
BASOPHILS NFR BLD AUTO: 0.9 % (ref 0–1.5)
BILIRUB SERPL-MCNC: 0.4 MG/DL (ref 0–1.2)
BUN SERPL-MCNC: 13 MG/DL (ref 6–20)
BUN/CREAT SERPL: 14.6 (ref 7–25)
CALCIUM SPEC-SCNC: 9.7 MG/DL (ref 8.6–10.5)
CHLORIDE SERPL-SCNC: 106 MMOL/L (ref 98–107)
CO2 SERPL-SCNC: 25.4 MMOL/L (ref 22–29)
CREAT SERPL-MCNC: 0.89 MG/DL (ref 0.57–1)
DEPRECATED RDW RBC AUTO: 38.5 FL (ref 37–54)
EGFRCR SERPLBLD CKD-EPI 2021: 91.3 ML/MIN/1.73
EOSINOPHIL # BLD AUTO: 0.11 10*3/MM3 (ref 0–0.4)
EOSINOPHIL NFR BLD AUTO: 1.5 % (ref 0.3–6.2)
ERYTHROCYTE [DISTWIDTH] IN BLOOD BY AUTOMATED COUNT: 11.9 % (ref 12.3–15.4)
GLOBULIN UR ELPH-MCNC: 2.8 GM/DL
GLUCOSE SERPL-MCNC: 101 MG/DL (ref 65–99)
HCT VFR BLD AUTO: 45 % (ref 34–46.6)
HGB BLD-MCNC: 14.7 G/DL (ref 12–15.9)
HOLD SPECIMEN: NORMAL
IMM GRANULOCYTES # BLD AUTO: 0.03 10*3/MM3 (ref 0–0.05)
IMM GRANULOCYTES NFR BLD AUTO: 0.4 % (ref 0–0.5)
LYMPHOCYTES # BLD AUTO: 2.31 10*3/MM3 (ref 0.7–3.1)
LYMPHOCYTES NFR BLD AUTO: 30.9 % (ref 19.6–45.3)
MCH RBC QN AUTO: 28.8 PG (ref 26.6–33)
MCHC RBC AUTO-ENTMCNC: 32.7 G/DL (ref 31.5–35.7)
MCV RBC AUTO: 88.2 FL (ref 79–97)
MONOCYTES # BLD AUTO: 0.45 10*3/MM3 (ref 0.1–0.9)
MONOCYTES NFR BLD AUTO: 6 % (ref 5–12)
NEUTROPHILS NFR BLD AUTO: 4.51 10*3/MM3 (ref 1.7–7)
NEUTROPHILS NFR BLD AUTO: 60.3 % (ref 42.7–76)
NRBC BLD AUTO-RTO: 0 /100 WBC (ref 0–0.2)
PLATELET # BLD AUTO: 240 10*3/MM3 (ref 140–450)
PMV BLD AUTO: 11.2 FL (ref 6–12)
POTASSIUM SERPL-SCNC: 3.9 MMOL/L (ref 3.5–5.2)
PROT SERPL-MCNC: 7.1 G/DL (ref 6–8.5)
RBC # BLD AUTO: 5.1 10*6/MM3 (ref 3.77–5.28)
SODIUM SERPL-SCNC: 144 MMOL/L (ref 136–145)
WBC NRBC COR # BLD AUTO: 7.48 10*3/MM3 (ref 3.4–10.8)

## 2024-08-12 PROCEDURE — 80053 COMPREHEN METABOLIC PANEL: CPT

## 2024-08-12 PROCEDURE — 99284 EMERGENCY DEPT VISIT MOD MDM: CPT

## 2024-08-12 PROCEDURE — 36415 COLL VENOUS BLD VENIPUNCTURE: CPT

## 2024-08-12 PROCEDURE — 70450 CT HEAD/BRAIN W/O DYE: CPT

## 2024-08-12 PROCEDURE — 85025 COMPLETE CBC W/AUTO DIFF WBC: CPT

## 2024-08-12 NOTE — TELEPHONE ENCOUNTER
Caller: Kinsey Suresh    Relationship: Self    Best call back number: [phone]     What is the medical concern/diagnosis: HAIR LOSS    What specialty or service is being requested: DERMATOLOGY     What is the provider, practice or medical service name: [facility]    What is the office location: [place]    What is the office phone number: [phone]

## 2024-08-12 NOTE — ED PROVIDER NOTES
"Time: 4:32 PM EDT  Date of encounter:  8/12/2024  Independent Historian/Clinical History and Information was obtained by:   {Blank multiple:96348}    History is limited by: {Limited History:64282}    Chief Complaint   Patient presents with    Headache     'rt. Side of head, with rad. Down rt. Side of face.with \"blurred vision and dizziness.\"  Onset at 1600.          History of Present Illness:  Patient is a 29 y.o. year old female who presents to the emergency department for evaluation of headache that started at approximately 1600 today, reports it radiates down the right side of her face down to her jaw.  Patient reports blurry vision in the right eye, dizziness.  Patient reports history of headaches similar to this in the past that usually it occurs bilaterally.  Patient denies nausea or vomiting, denies weakness, no facial asymmetry or swelling.  (MICHEAL Ibrahim, provider in triage)     Patient Care Team  Primary Care Provider: Andrea Rollins MD    Past Medical History:     Allergies   Allergen Reactions    Ciprofloxacin Rash    Doxycycline Hyclate Rash    Doxycycline Monohydrate Rash    Latex Rash    Sulfa Antibiotics Rash    Sulfamethoxazole-Trimethoprim Hives and Rash    Trimethoprim Rash     Past Medical History:   Diagnosis Date    Anxiety     Asthma     PRN INHALERS    Borderline diabetic     NO MEDICATIONS AS OF YET TRYING CHANGE OF DIET- DOESN'T CHECK BG AT HOME    Clostridium difficile diarrhea 01/30/2022    DENIES ANY CURRENT ISSUES    Depression     GERD (gastroesophageal reflux disease)     HL (hearing loss)     Hypertension     Irritable bowel syndrome     Kidney stones     Migraine without aura     Obesity     Ovarian cyst     right ovary     PONV (postoperative nausea and vomiting)     PVC (premature ventricular contraction)     FOLLOWED BY DR FIGUEROA HAS SCHEDULED ECHO ON 7/3/23 . REPORTS ISSUES WITH UPPER LEFT CHEST PAIN BUT STATES IS SORE WITH PALPATION ALSO REPORTS SOA WITH " EXERTION. ACTIVE WORKS FULL TIME    Right ventricular cardiac abnormality     DILATATION PER DR FIGUEROA NOTE - DENIED CP/SOB    Seasonal allergies     Sleep apnea     DOES NOT USE CPAP    Urinary tract infection     NO CURRENT ISSUES     Past Surgical History:   Procedure Laterality Date    BILATERAL BREAST REDUCTION Bilateral 7/11/2023    Procedure: BREAST REDUCTION BILATERAL EXCISION OF LEFT UPPER CHEST MOLE;  Surgeon: Mariah Lieberman MD;  [place] [facility] [place] [facility]  Service: Plastics;  Laterality: Bilateral;    COLONOSCOPY  2020    COLONOSCOPY N/A 7/11/2022    Procedure: COLONOSCOPY BIOPSY;  Surgeon: David Yip MD;  Location: [facility] ENDOSCOPY;  Service: Gastroenterology;  Laterality: N/A;  NORMAL COLON    ENDOSCOPY      EXCISION LESION Left 7/11/2023    Procedure: BREAST REDUCTION BILATERAL EXCISION OF LEFT UPPER CHEST MOLE;  Surgeon: Mariah Lieberman MD;  [place] [facility] [place] [facility]  Service: Plastics;  Laterality: Left;    KIDNEY STONE SURGERY  11/2021    URETEROSCOPY LASER LITHOTRIPSY WITH STENT INSERTION Left 11/13/2021    Procedure: URETEROSCOPY LASER LITHOTRIPSY WITH STENT INSERTION STONE BASKET EXTRACTION;  Surgeon: Colt Sanders MD;  Location: [facility] MAIN OR;  Service: Urology;  Laterality: Left;    URETEROSCOPY LASER LITHOTRIPSY WITH STENT INSERTION Right 8/18/2023    Procedure: CYSTOSCOPY URETEROSCOPY RETROGRADE PYELOGRAM HOLMIUM LASER STENT INSERTION, right;  Surgeon: Bailee Edgar MD;  Location: [facility] MAIN OR;  Service: Urology;  Laterality: Right;     Family History   Problem Relation Age of Onset    Heart failure Mother     Early death Mother     Asthma Sister     Depression Sister     Vision loss Sister     Other Sister     Cancer Maternal Aunt     Skin cancer Maternal Grandmother     Anxiety disorder Maternal Grandmother     Colon cancer Maternal Grandfather     Heart failure Maternal Grandfather     Diabetes Maternal Grandfather     Heart disease Maternal  Grandfather     Hyperlipidemia Maternal Grandfather     Colon cancer Paternal Grandfather     Heart disease Maternal Great-Grandfather     Breast cancer Other 60    Ovarian cancer Neg Hx     Uterine cancer Neg Hx     Prostate cancer Neg Hx        Home Medications:  Prior to Admission medications    Medication Sig Start Date End Date Taking? Authorizing Provider   albuterol sulfate  (90 Base) MCG/ACT inhaler INHALE 2 PUFFS INTO THE LUNGS EVERY 4 (FOUR) HOURS AS NEEDED FOR SHORTNESS OF AIR. 8/22/22   ProviderEric MD   bisoprolol (ZEBeta) 5 MG tablet Take 0.5 tablets by mouth Daily. 7/8/24   Andrea Rollins MD   dicyclomine (BENTYL) 10 MG capsule Take 2 capsules by mouth 3 (Three) Times a Day As Needed for Abdominal Cramping. 5/14/24   Makenzie Sigala APRN   famotidine (Pepcid) 20 MG tablet Take 1 tablet by mouth 2 (Two) Times a Day. 4/22/24   Andrea Rollins MD   norethindrone (MICRONOR) 0.35 MG tablet Take 1 tablet by mouth Daily. 6/18/24   Felicita Edward APRN   omeprazole (priLOSEC) 40 MG capsule Take 1 capsule by mouth Daily. 4/22/24   Andrea Rollins MD   ondansetron ODT (ZOFRAN-ODT) 4 MG disintegrating tablet Place 1 tablet on the tongue Every 6 (Six) Hours As Needed for Vomiting. 4/22/24   Andrea Rollins MD   sucralfate (Carafate) 1 g tablet Take 1 tablet by mouth 3 (Three) Times a Day. 4/22/24   Andrea Rollins MD   venlafaxine (EFFEXOR) 75 MG tablet Take 1 tablet by mouth Daily. 7/8/24   Andrea Rollins MD   fluticasone (FLONASE) 50 MCG/ACT nasal spray 2 sprays into the nostril(s) as directed by provider Daily. 7/25/21 4/22/24  Rosio Mccoy APRN        Social History:   Social History     Tobacco Use    Smoking status: Never     Passive exposure: Yes    Smokeless tobacco: Never    Tobacco comments:     secondhand smoke exposure   Vaping Use    Vaping status: Never Used    Passive vaping exposure: Yes   Substance Use Topics    Alcohol use: Never    Drug use: Never  "        Review of Systems:  Review of Systems   Constitutional:  Negative for fever.   Neurological:  Positive for dizziness and headaches.        Physical Exam:  /71 (BP Location: Right arm, Patient Position: Sitting)   Pulse 98   Temp 97.7 °F (36.5 °C) (Oral)   Resp 20   Ht 162.6 cm (64\")   Wt 101 kg (223 lb 12.3 oz)   LMP 08/02/2024 (Exact Date)   SpO2 100%   BMI 38.41 kg/m²         Physical Exam  HENT:      Head: Normocephalic.      Mouth/Throat:      Mouth: Mucous membranes are moist.   Eyes:      Pupils: Pupils are equal, round, and reactive to light.   Pulmonary:      Effort: Pulmonary effort is normal.   Abdominal:      General: There is no distension.   Musculoskeletal:      Cervical back: Neck supple.   Skin:     General: Skin is warm and dry.   Neurological:      General: No focal deficit present.      Mental Status: She is alert and oriented to person, place, and time.   Psychiatric:         Mood and Affect: Mood normal.         Behavior: Behavior normal.        ***              Procedures:  Procedures      Medical Decision Making:      Comorbidities that affect care:    {Comorbidities that affect care:38176}    External Notes reviewed:    {External Note review (Optional):98615}      The following orders were placed and all results were independently analyzed by me:  No orders of the defined types were placed in this encounter.      Medications Given in the Emergency Department:  Medications - No data to display     ED Course:    The patient was initially evaluated in the triage area where orders were placed. The patient was later dispositioned by MICHEAL Ibrahim.      The patient was advised to stay for completion of workup which includes but is not limited to communication of labs and radiological results, reassessment and plan. The patient was advised that leaving prior to disposition by a provider could result in critical findings that are not communicated to the patient.      "     Labs:    Lab Results (last 24 hours)       ** No results found for the last 24 hours. **             Imaging:    No Radiology Exams Resulted Within Past 24 Hours      Differential Diagnosis and Discussion:      {Differentials:36162}    {Independent Review of (Optional):94145}    MDM       {Critical Care:33103}    {SEPSIS RECOGNITION:21912}    Patient Care Considerations:    {Considerations (Optional):28671}      Consultants/Shared Management Plan:    {Shared Management Plan (Optional):97832}    Social Determinants of Health:    {Social Determinants of Health (Optional):92390}      Disposition and Care Coordination:    {Admission consideration:21334}    {Discharge (Optional):33612}    Final diagnoses:   None        ED Disposition       None            This medical record created using voice recognition software.

## 2024-08-13 ENCOUNTER — TELEPHONE (OUTPATIENT)
Dept: FAMILY MEDICINE CLINIC | Facility: CLINIC | Age: 29
End: 2024-08-13

## 2024-08-13 NOTE — TELEPHONE ENCOUNTER
Patient has current referral to [facility].  She is requesting to be scheduled with [facility].

## 2024-08-13 NOTE — TELEPHONE ENCOUNTER
Letter has been dictated and sent via [employer].      This document has been electronically signed by Andrea Rollins MD on August 13, 2024 11:31 EDT

## 2024-08-14 ENCOUNTER — TELEPHONE (OUTPATIENT)
Dept: FAMILY MEDICINE CLINIC | Facility: CLINIC | Age: 29
End: 2024-08-14
Payer: COMMERCIAL

## 2024-08-14 NOTE — TELEPHONE ENCOUNTER
Patient called to ask if Dr. Rollins could go back to her Medical Professional statement to the last page and check yes or no to question number 4

## 2024-08-15 ENCOUNTER — OFFICE VISIT (OUTPATIENT)
Dept: FAMILY MEDICINE CLINIC | Facility: CLINIC | Age: 29
End: 2024-08-15
Payer: COMMERCIAL

## 2024-08-15 VITALS
HEART RATE: 55 BPM | OXYGEN SATURATION: 99 % | WEIGHT: 224 LBS | HEIGHT: 64 IN | DIASTOLIC BLOOD PRESSURE: 58 MMHG | BODY MASS INDEX: 38.24 KG/M2 | SYSTOLIC BLOOD PRESSURE: 119 MMHG

## 2024-08-15 DIAGNOSIS — R45.89 DEPRESSED MOOD: Primary | ICD-10-CM

## 2024-08-15 PROCEDURE — 3044F HG A1C LEVEL LT 7.0%: CPT | Performed by: STUDENT IN AN ORGANIZED HEALTH CARE EDUCATION/TRAINING PROGRAM

## 2024-08-15 PROCEDURE — 99213 OFFICE O/P EST LOW 20 MIN: CPT | Performed by: STUDENT IN AN ORGANIZED HEALTH CARE EDUCATION/TRAINING PROGRAM

## 2024-08-15 PROCEDURE — 1125F AMNT PAIN NOTED PAIN PRSNT: CPT | Performed by: STUDENT IN AN ORGANIZED HEALTH CARE EDUCATION/TRAINING PROGRAM

## 2024-08-15 NOTE — LETTER
August 15, 2024     Patient: Kinsey Suresh   YOB: 1995   Date of Visit: 8/15/2024       To Whom It May Concern:    Dear [employer]:     This letter is to document that Kinsey Suresh is a 29-year-old female who has been diagnosed with depression since before ever coming to establish care with me.  She does receive medical treatment for this.  This condition may be permanent and can cause functional limitations due to depression with reasonable accommodations such as allowing her to take breaks when frustrated by depression.     Kinsey will continued assistance through job support supplied by organization.  The support may range from reasonable accommodations to counseling and vocational rehabilitation.  Ms. Suresh is an excellent candidate for your organization         Sincerely,        Andrea Rollins MD    CC: No Recipients

## 2024-08-15 NOTE — TELEPHONE ENCOUNTER
Addressed during patient visit today.      This document has been electronically signed by Andrea Rollins MD on August 15, 2024 09:31 EDT

## 2024-08-15 NOTE — PROGRESS NOTES
Subjective:       Kinsey Suresh is a 29 y.o. female who presents for paperwork.      Kinsey has requested updated paperwork.  Please refer to my previous documentation regarding paperwork for work.  She had requested specifically today that I add on her chronic diagnosis of depression to the letter I sent previously.  I did that today      The following portions of the patient's history were reviewed and updated as appropriate: allergies, current medications, past family history, past medical history, past social history, past surgical history, and problem list.    Past Medical Hx:  Past Medical History:   Diagnosis Date    Anxiety     Asthma     PRN INHALERS    Borderline diabetic     NO MEDICATIONS AS OF YET TRYING CHANGE OF DIET- DOESN'T CHECK BG AT HOME    Clostridium difficile diarrhea 01/30/2022    DENIES ANY CURRENT ISSUES    Depression     GERD (gastroesophageal reflux disease)     HL (hearing loss)     Hypertension     Irritable bowel syndrome     Kidney stones     Migraine without aura     Obesity     Ovarian cyst     right ovary     PONV (postoperative nausea and vomiting)     PVC (premature ventricular contraction)     FOLLOWED BY DR FIGUEROA HAS SCHEDULED ECHO ON 7/3/23 . REPORTS ISSUES WITH UPPER LEFT CHEST PAIN BUT STATES IS SORE WITH PALPATION ALSO REPORTS SOA WITH EXERTION. ACTIVE WORKS FULL TIME    Right ventricular cardiac abnormality     DILATATION PER DR FIGUEROA NOTE - DENIED CP/SOB    Seasonal allergies     Sleep apnea     DOES NOT USE CPAP    Urinary tract infection     NO CURRENT ISSUES       Past Surgical Hx:  Past Surgical History:   Procedure Laterality Date    BILATERAL BREAST REDUCTION Bilateral 7/11/2023    Procedure: BREAST REDUCTION BILATERAL EXCISION OF LEFT UPPER CHEST MOLE;  Surgeon: Mariah Lieberman MD;  [place] [facility] [place] [facility] [place]  Service: Plastics;  Laterality: Bilateral;    COLONOSCOPY  2020    COLONOSCOPY N/A 7/11/2022    Procedure: COLONOSCOPY BIOPSY;   Surgeon: David Yip MD;  Location: [facility] ENDOSCOPY;  Service: Gastroenterology;  Laterality: N/A;  NORMAL COLON    ENDOSCOPY      EXCISION LESION Left 7/11/2023    Procedure: BREAST REDUCTION BILATERAL EXCISION OF LEFT UPPER CHEST MOLE;  Surgeon: Mariah Lieberman MD;  Location: [facility];  Service: Plastics;  Laterality: Left;    KIDNEY STONE SURGERY  11/2021    URETEROSCOPY LASER LITHOTRIPSY WITH STENT INSERTION Left 11/13/2021    Procedure: URETEROSCOPY LASER LITHOTRIPSY WITH STENT INSERTION STONE BASKET EXTRACTION;  Surgeon: Colt Sanders MD;  Location: [facility] MAIN OR;  Service: Urology;  Laterality: Left;    URETEROSCOPY LASER LITHOTRIPSY WITH STENT INSERTION Right 8/18/2023    Procedure: CYSTOSCOPY URETEROSCOPY RETROGRADE PYELOGRAM HOLMIUM LASER STENT INSERTION, right;  Surgeon: Bailee Edgar MD;  Location: [facility] MAIN OR;  Service: Urology;  Laterality: Right;       Current Meds:    Current Outpatient Medications:     albuterol sulfate  (90 Base) MCG/ACT inhaler, INHALE 2 PUFFS INTO THE LUNGS EVERY 4 (FOUR) HOURS AS NEEDED FOR SHORTNESS OF AIR., Disp: , Rfl:     bisoprolol (ZEBeta) 5 MG tablet, Take 0.5 tablets by mouth Daily., Disp: 30 tablet, Rfl: 0    dicyclomine (BENTYL) 10 MG capsule, Take 2 capsules by mouth 3 (Three) Times a Day As Needed for Abdominal Cramping., Disp: 120 capsule, Rfl: 1    famotidine (Pepcid) 20 MG tablet, Take 1 tablet by mouth 2 (Two) Times a Day., Disp: 60 tablet, Rfl: 3    norethindrone (MICRONOR) 0.35 MG tablet, Take 1 tablet by mouth Daily., Disp: 84 tablet, Rfl: 4    omeprazole (priLOSEC) 40 MG capsule, Take 1 capsule by mouth Daily., Disp: 90 capsule, Rfl: 1    ondansetron ODT (ZOFRAN-ODT) 4 MG disintegrating tablet, Place 1 tablet on the tongue Every 6 (Six) Hours As Needed for Vomiting., Disp: 15 tablet, Rfl: 0    sucralfate (Carafate) 1 g tablet, Take 1 tablet by mouth 3 (Three) Times a Day., Disp: 90 tablet, Rfl: 1    venlafaxine  (EFFEXOR) 75 MG tablet, Take 1 tablet by mouth Daily., Disp: 90 tablet, Rfl: 3    Allergies:  Allergies   Allergen Reactions    Ciprofloxacin Rash    Doxycycline Hyclate Rash    Doxycycline Monohydrate Rash    Latex Rash    Sulfa Antibiotics Rash    Sulfamethoxazole-Trimethoprim Hives and Rash    Trimethoprim Rash       Family Hx:  Family History   Problem Relation Age of Onset    Heart failure Mother     Early death Mother     Asthma Sister     Depression Sister     Vision loss Sister     Other Sister     Cancer Maternal Aunt     Skin cancer Maternal Grandmother     Anxiety disorder Maternal Grandmother     Colon cancer Maternal Grandfather     Heart failure Maternal Grandfather     Diabetes Maternal Grandfather     Heart disease Maternal Grandfather     Hyperlipidemia Maternal Grandfather     Colon cancer Paternal Grandfather     Heart disease Maternal Great-Grandfather     Breast cancer Other 60    Ovarian cancer Neg Hx     Uterine cancer Neg Hx     Prostate cancer Neg Hx         Social History:  Social History     Socioeconomic History    Marital status:    Tobacco Use    Smoking status: Never     Passive exposure: Yes    Smokeless tobacco: Never    Tobacco comments:     secondhand smoke exposure   Vaping Use    Vaping status: Never Used    Passive vaping exposure: Yes   Substance and Sexual Activity    Alcohol use: Never    Drug use: Never    Sexual activity: Not Currently     Partners: Male     Birth control/protection: None       Review of Systems  Review of Systems   Psychiatric/Behavioral:  Positive for dysphoric mood.        Objective:     LMP 08/02/2024 (Exact Date)   Physical Exam  Constitutional:       General: She is not in acute distress.     Appearance: Normal appearance. She is obese. She is not ill-appearing, toxic-appearing or diaphoretic.   Pulmonary:      Effort: Pulmonary effort is normal. No respiratory distress.   Neurological:      Mental Status: She is alert.           Assessment/Plan:     Diagnoses and all orders for this visit:    1. Depressed mood (Primary)    Kinsey has requested updated paperwork.  Please refer to my previous documentation regarding paperwork for work.  She had requested specifically today that I add on her chronic diagnosis of depression to the letter I sent previously.  I did that today.          Follow-up:     Return for Next scheduled follow up.    Preventative:  Health Maintenance   Topic Date Due    URINE MICROALBUMIN  Never done    BMI FOLLOWUP  Never done    Pneumococcal Vaccine 0-64 (1 of 2 - PCV) Never done    ANNUAL PHYSICAL  Never done    DIABETIC FOOT EXAM  Never done    COVID-19 Vaccine (3 - 2023-24 season) 09/01/2023    DIABETIC EYE EXAM  08/04/2024    INFLUENZA VACCINE  08/01/2024    HEMOGLOBIN A1C  10/02/2024    PAP SMEAR  05/14/2027    COLORECTAL CANCER SCREENING  07/11/2027    TDAP/TD VACCINES (3 - Td or Tdap) 09/30/2030    HEPATITIS C SCREENING  Completed    Hepatitis B  Completed    CHLAMYDIA SCREENING  Discontinued         This document has been electronically signed by Andrea Rollins MD on August 15, 2024 09:03 EDT       Parts of this note are electronic transcriptions/translations of spoken language to printed text using the Dragon Dictation system.

## 2024-08-20 ENCOUNTER — HOSPITAL ENCOUNTER (OUTPATIENT)
Facility: HOSPITAL | Age: 29
Discharge: HOME | End: 2024-08-20
Admitting: STUDENT IN AN ORGANIZED HEALTH CARE EDUCATION/TRAINING PROGRAM
Payer: COMMERCIAL

## 2024-08-20 DIAGNOSIS — M25.539 PAIN IN WRIST, UNSPECIFIED LATERALITY: ICD-10-CM

## 2024-08-20 PROCEDURE — 95909 NRV CNDJ TST 5-6 STUDIES: CPT

## 2024-08-20 PROCEDURE — 95886 MUSC TEST DONE W/N TEST COMP: CPT

## 2024-08-21 ENCOUNTER — TELEPHONE (OUTPATIENT)
Dept: FAMILY MEDICINE CLINIC | Facility: CLINIC | Age: 29
End: 2024-08-21
Payer: COMMERCIAL

## 2024-08-21 NOTE — TELEPHONE ENCOUNTER
Hub staff attempted to follow warm transfer process and was unsuccessful     Caller: Kinsey Suresh    Relationship to patient: Self    Best call back number: [phone]    Patient is needing: PATIENT IS RETURNING CALL TO [place].

## 2024-08-21 NOTE — PROGRESS NOTES
Can we let patient know study does not demonstrate evidence of nerve cause of pain?     Thank you,    Andrea Rollins

## 2024-08-29 ENCOUNTER — PATIENT MESSAGE (OUTPATIENT)
Dept: FAMILY MEDICINE CLINIC | Facility: CLINIC | Age: 29
End: 2024-08-29
Payer: COMMERCIAL

## 2024-08-29 NOTE — TELEPHONE ENCOUNTER
Please let patient know new letter has been sent.  If she has new paperwork to fill out or existing paperwork available for me, that will need to be printed out and brought to me.      This document has been electronically signed by Andrea Rollins MD on August 29, 2024 14:04 EDT\

## 2024-08-29 NOTE — LETTER
August 29, 2024     Kinsey Suresh    Patient: Kinsey Suresh   YOB: 1995   Date of Visit: 8/29/2024     To Whom It May Concern:     Dear [employer]:     This letter is to document that Kinsey Suresh is a 29-year-old female who has been diagnosed with depression since before ever coming to establish care with me.  She does receive medical treatment for this.  This condition may be permanent and can cause functional limitations due to depression with reasonable accommodations such as allowing her to take breaks when frustrated by depression.  She also has irritable bowel syndrome.     Kinsey will continued assistance through job support supplied by organization.  The support may range from reasonable accommodations to counseling and vocational rehabilitation.  Ms. Suresh is an excellent candidate for your organization            Sincerely,        Andrea Rollins MD        CC: No Recipients

## 2024-09-03 ENCOUNTER — TELEPHONE (OUTPATIENT)
Dept: GASTROENTEROLOGY | Facility: CLINIC | Age: 29
End: 2024-09-03

## 2024-09-03 NOTE — TELEPHONE ENCOUNTER
Called patient to advise her that we will need to reschedule her appointment due to Makenzie being out of office today. Patient requested to cancel her appointment and not rescheduled due to losing her insurance on 10/1/2024. Patient stated she will call back to reschedule.

## 2024-09-10 DIAGNOSIS — R00.2 PALPITATIONS: ICD-10-CM

## 2024-09-10 RX ORDER — BISOPROLOL FUMARATE 5 MG/1
2.5 TABLET, FILM COATED ORAL DAILY
Qty: 45 TABLET | Refills: 0 | Status: SHIPPED | OUTPATIENT
Start: 2024-09-10

## 2024-09-19 ENCOUNTER — TELEPHONE (OUTPATIENT)
Dept: FAMILY MEDICINE CLINIC | Facility: CLINIC | Age: 29
End: 2024-09-19
Payer: COMMERCIAL

## 2024-09-23 PROBLEM — R30.0 BURNING WITH URINATION: Status: ACTIVE | Noted: 2024-09-23

## 2024-09-23 PROCEDURE — 87510 GARDNER VAG DNA DIR PROBE: CPT

## 2024-09-23 PROCEDURE — 87086 URINE CULTURE/COLONY COUNT: CPT

## 2024-09-23 PROCEDURE — 87660 TRICHOMONAS VAGIN DIR PROBE: CPT

## 2024-09-23 PROCEDURE — 87480 CANDIDA DNA DIR PROBE: CPT

## 2024-09-24 ENCOUNTER — PATIENT ROUNDING (BHMG ONLY) (OUTPATIENT)
Dept: URGENT CARE | Facility: CLINIC | Age: 29
End: 2024-09-24
Payer: COMMERCIAL

## 2024-09-27 ENCOUNTER — TELEPHONE (OUTPATIENT)
Dept: FAMILY MEDICINE CLINIC | Facility: CLINIC | Age: 29
End: 2024-09-27
Payer: COMMERCIAL

## 2024-09-27 NOTE — TELEPHONE ENCOUNTER
HUB TO RELAY AND SCHEDULE      PCP OUT OF OFFICE. Called patient to reschedule appt on 09/30/2024, left annmarie.

## 2024-10-02 ENCOUNTER — TELEPHONE (OUTPATIENT)
Dept: FAMILY MEDICINE CLINIC | Facility: CLINIC | Age: 29
End: 2024-10-02
Payer: COMMERCIAL

## 2024-10-09 DIAGNOSIS — K21.9 GASTROESOPHAGEAL REFLUX DISEASE WITHOUT ESOPHAGITIS: ICD-10-CM

## 2024-10-11 RX ORDER — FAMOTIDINE 20 MG/1
20 TABLET, FILM COATED ORAL 2 TIMES DAILY
Qty: 180 TABLET | Refills: 1 | Status: SHIPPED | OUTPATIENT
Start: 2024-10-11

## 2024-10-16 ENCOUNTER — OFFICE VISIT (OUTPATIENT)
Dept: FAMILY MEDICINE CLINIC | Facility: CLINIC | Age: 29
End: 2024-10-16
Payer: COMMERCIAL

## 2024-10-16 VITALS
HEIGHT: 64 IN | TEMPERATURE: 97.9 F | DIASTOLIC BLOOD PRESSURE: 61 MMHG | BODY MASS INDEX: 37.22 KG/M2 | WEIGHT: 218 LBS | OXYGEN SATURATION: 100 % | SYSTOLIC BLOOD PRESSURE: 95 MMHG | HEART RATE: 71 BPM

## 2024-10-16 DIAGNOSIS — E53.8 FOLIC ACID DEFICIENCY: ICD-10-CM

## 2024-10-16 DIAGNOSIS — F32.A ANXIETY AND DEPRESSION: ICD-10-CM

## 2024-10-16 DIAGNOSIS — F41.9 ANXIETY: ICD-10-CM

## 2024-10-16 DIAGNOSIS — Z00.00 ANNUAL PHYSICAL EXAM: Primary | ICD-10-CM

## 2024-10-16 DIAGNOSIS — F34.1 PERSISTENT DEPRESSIVE DISORDER: ICD-10-CM

## 2024-10-16 DIAGNOSIS — F41.9 ANXIETY AND DEPRESSION: ICD-10-CM

## 2024-10-16 DIAGNOSIS — K21.9 GASTROESOPHAGEAL REFLUX DISEASE WITHOUT ESOPHAGITIS: ICD-10-CM

## 2024-10-16 PROBLEM — G47.39 OTHER SLEEP APNEA: Status: ACTIVE | Noted: 2024-10-16

## 2024-10-16 PROBLEM — E66.9 OBESITY (BMI 30-39.9): Status: ACTIVE | Noted: 2024-10-16

## 2024-10-16 PROCEDURE — 99395 PREV VISIT EST AGE 18-39: CPT | Performed by: NURSE PRACTITIONER

## 2024-10-16 RX ORDER — FOLIC ACID 1 MG/1
1 TABLET ORAL DAILY
Qty: 90 TABLET | Refills: 1 | Status: SHIPPED | OUTPATIENT
Start: 2024-10-16

## 2024-10-16 RX ORDER — OMEPRAZOLE 40 MG/1
40 CAPSULE, DELAYED RELEASE ORAL DAILY
Qty: 90 CAPSULE | Refills: 1 | Status: SHIPPED | OUTPATIENT
Start: 2024-10-16

## 2024-10-16 RX ORDER — VENLAFAXINE 75 MG/1
75 TABLET ORAL DAILY
Qty: 90 TABLET | Refills: 1 | Status: SHIPPED | OUTPATIENT
Start: 2024-10-16

## 2024-10-16 NOTE — TELEPHONE ENCOUNTER
Caller: Kinsey Suresh    Relationship: Self    Best call back number: [phone]    Requested Prescriptions:   Requested Prescriptions     Pending Prescriptions Disp Refills    venlafaxine (EFFEXOR) 75 MG tablet 90 tablet 3     Sig: Take 1 tablet by mouth Daily.        Pharmacy where request should be sent: [facility]Y #[id number] - [place], [place] - [address] [facility] - [phone]  - [phone] FX     Last office visit with prescribing clinician: 10/16/2024   Last telemedicine visit with prescribing clinician: Visit date not found   Next office visit with prescribing clinician: 12/11/2024     Additional details provided by patient: OUT OF MEDICATION     Does the patient have less than a 3 day supply:  [x] Yes  [] No    Would you like a call back once the refill request has been completed: [] Yes [] No    If the office needs to give you a call back, can they leave a voicemail: [] Yes [] No    Hamida Mcwilliams Rep   10/16/24 11:45 EDT

## 2024-10-16 NOTE — PROGRESS NOTES
Chief Complaint    Annual physical exam with lab  Establish Care (Previous PCP is Dr. LATA Rollins), Anxiety, Depression, Heartburn, Allergies, and Immature Ventricular Contractions    SUBJECTIVE  Kinsey Suresh presents to [facility]        Annual physical exam with lab    Previous patient of Dr. Rollins to establish new primary care.    Immature Ventricular Contractions:  Patient is taking Bisoprolol.  Patient's Blood Pressure in clinic today is 95/61.  Patient does not monitor blood pressure at home.  Patient denies chest pain, shortness of air, headache, flushing, abnormal swelling in feet/ankles.  Patient's cardiologist is Dr. Delong.    Anxiety/Depression:  Patient is taking Effixor, with good control of symptoms.  Patient denies suicidal ideations or thoughts of harming herself or others.    Gastroesophageal Reflux:  Patient is taking Omeprazole, Famotidine, Sucralfate, with good control of symptoms.  Patient does not need over the counter medications for breakthrough symptoms.  Patient tries to avoid trigger foods, eat frequent small meals, not lie down within 2 hours of eating, avoids NSAIDS medications and alcohol.    Seasonal Allergies:  Patient is taking Flonase, Albuterol HFA PRN with good control of symptoms.      History of Present Illness  Past Medical History:   Diagnosis Date    Anxiety     Asthma     PRN INHALERS    Borderline diabetic     NO MEDICATIONS AS OF YET TRYING CHANGE OF DIET- DOESN'T CHECK BG AT HOME    Clostridium difficile diarrhea 01/30/2022    DENIES ANY CURRENT ISSUES    Depression     GERD (gastroesophageal reflux disease)     HL (hearing loss)     Hypertension     Irritable bowel syndrome     Kidney stones     Migraine without aura     Obesity     Ovarian cyst     right ovary     PONV (postoperative nausea and vomiting)     PVC (premature ventricular contraction)     FOLLOWED BY DR FIGUEROA HAS SCHEDULED ECHO ON 7/3/23 . REPORTS ISSUES WITH  UPPER LEFT CHEST PAIN BUT STATES IS SORE WITH PALPATION ALSO REPORTS SOA WITH EXERTION. ACTIVE WORKS FULL TIME    Right ventricular cardiac abnormality     DILATATION PER DR FIGUEROA NOTE - DENIED CP/SOB    Seasonal allergies     Sleep apnea     DOES NOT USE CPAP    Urinary tract infection     NO CURRENT ISSUES      Family History   Problem Relation Age of Onset    Heart failure Mother     Early death Mother     Asthma Sister     Depression Sister     Vision loss Sister     Other Sister     Cancer Maternal Aunt     Skin cancer Maternal Grandmother     Anxiety disorder Maternal Grandmother     Colon cancer Maternal Grandfather     Heart failure Maternal Grandfather     Diabetes Maternal Grandfather     Heart disease Maternal Grandfather     Hyperlipidemia Maternal Grandfather     Colon cancer Paternal Grandfather     Heart disease Maternal Great-Grandfather     Breast cancer Other 60    Ovarian cancer Neg Hx     Uterine cancer Neg Hx     Prostate cancer Neg Hx       Past Surgical History:   Procedure Laterality Date    BILATERAL BREAST REDUCTION Bilateral 7/11/2023    Procedure: BREAST REDUCTION BILATERAL EXCISION OF LEFT UPPER CHEST MOLE;  Surgeon: Mariah Lieberman MD;  Location: [facility] [place];  Service: Plastics;  Laterality: Bilateral;    COLONOSCOPY  2020    COLONOSCOPY N/A 7/11/2022    Procedure: COLONOSCOPY BIOPSY;  Surgeon: David Yip MD;  Location: [facility] ENDOSCOPY;  Service: Gastroenterology;  Laterality: N/A;  NORMAL COLON    ENDOSCOPY      EXCISION LESION Left 7/11/2023    Procedure: BREAST REDUCTION BILATERAL EXCISION OF LEFT UPPER CHEST MOLE;  Surgeon: Mariah Lieberman MD;  Location: [facility];  Service: Plastics;  Laterality: Left;    KIDNEY STONE SURGERY  11/2021    URETEROSCOPY LASER LITHOTRIPSY WITH STENT INSERTION Left 11/13/2021    Procedure: URETEROSCOPY LASER LITHOTRIPSY WITH STENT INSERTION STONE BASKET EXTRACTION;  Surgeon: Colt Sanders MD;  Location:   "[facility] MAIN OR;  Service: Urology;  Laterality: Left;    URETEROSCOPY LASER LITHOTRIPSY WITH STENT INSERTION Right 8/18/2023    Procedure: CYSTOSCOPY URETEROSCOPY RETROGRADE PYELOGRAM HOLMIUM LASER STENT INSERTION, right;  Surgeon: Bailee Edgar MD;  Location: [facility] MAIN OR;  Service: Urology;  Laterality: Right;        Current Outpatient Medications:     albuterol sulfate  (90 Base) MCG/ACT inhaler, INHALE 2 PUFFS INTO THE LUNGS EVERY 4 (FOUR) HOURS AS NEEDED FOR SHORTNESS OF AIR., Disp: , Rfl:     bisoprolol (ZEBeta) 5 MG tablet, Take 0.5 tablets by mouth Daily., Disp: 45 tablet, Rfl: 0    dicyclomine (BENTYL) 10 MG capsule, Take 2 capsules by mouth 3 (Three) Times a Day As Needed for Abdominal Cramping., Disp: 120 capsule, Rfl: 1    famotidine (PEPCID) 20 MG tablet, TAKE 1 TABLET BY MOUTH TWICE A DAY, Disp: 180 tablet, Rfl: 1    omeprazole (priLOSEC) 40 MG capsule, Take 1 capsule by mouth Daily., Disp: 90 capsule, Rfl: 1    ondansetron ODT (ZOFRAN-ODT) 4 MG disintegrating tablet, Place 1 tablet on the tongue Every 6 (Six) Hours As Needed for Vomiting., Disp: 15 tablet, Rfl: 0    sucralfate (Carafate) 1 g tablet, Take 1 tablet by mouth 3 (Three) Times a Day., Disp: 90 tablet, Rfl: 1    venlafaxine (EFFEXOR) 75 MG tablet, Take 1 tablet by mouth Daily., Disp: 90 tablet, Rfl: 3    folic acid (FOLVITE) 1 MG tablet, Take 1 tablet by mouth Daily., Disp: 90 tablet, Rfl: 1    OBJECTIVE  Vital Signs:   BP 95/61 (BP Location: Left arm, Patient Position: Sitting, Cuff Size: Large Adult)   Pulse 71   Temp 97.9 °F (36.6 °C) (Temporal)   Ht 162.6 cm (64\")   Wt 98.9 kg (218 lb)   LMP 08/12/2024   SpO2 100%   BMI 37.42 kg/m²    Estimated body mass index is 37.42 kg/m² as calculated from the following:    Height as of this encounter: 162.6 cm (64\").    Weight as of this encounter: 98.9 kg (218 lb).     Wt Readings from Last 3 Encounters:   10/16/24 98.9 kg (218 lb)   09/23/24 100 kg (221 lb)   08/15/24 102 kg (224 " lb)     BP Readings from Last 3 Encounters:   10/16/24 95/61   09/23/24 116/75   08/15/24 119/58       Physical Exam  Vitals reviewed.   Constitutional:       Appearance: Normal appearance. She is well-developed.   HENT:      Head: Normocephalic and atraumatic.      Right Ear: External ear normal.      Left Ear: External ear normal.      Mouth/Throat:      Pharynx: No oropharyngeal exudate.   Eyes:      Conjunctiva/sclera: Conjunctivae normal.      Pupils: Pupils are equal, round, and reactive to light.   Neck:      Vascular: No carotid bruit.   Cardiovascular:      Rate and Rhythm: Normal rate and regular rhythm.      Pulses: Normal pulses.      Heart sounds: Normal heart sounds. No murmur heard.     No friction rub. No gallop.   Pulmonary:      Effort: Pulmonary effort is normal.      Breath sounds: Normal breath sounds. No wheezing or rhonchi.   Skin:     General: Skin is warm and dry.   Neurological:      Mental Status: She is alert and oriented to person, place, and time.      Cranial Nerves: No cranial nerve deficit.   Psychiatric:         Mood and Affect: Mood and affect normal.         Behavior: Behavior normal.         Thought Content: Thought content normal.         Judgment: Judgment normal.          Result Review        CT Head Without Contrast    Result Date: 8/12/2024  Impression: No acute intracranial process evident. Electronically Signed: Triston Lux MD  8/12/2024 5:01 PM EDT  Workstation ID: [id number]        The above data has been reviewed by MICHEAL Renae 10/16/2024 10:51 EDT.          Patient Care Team:  Sandi Gerardo APRN as PCP - General (Family Medicine)  Felicita Edward APRN (Nurse Practitioner)  Bailee Edgar MD as Consulting Physician (Urology)  Makenzie Sigala APRN as Nurse Practitioner (Nurse Practitioner)    Class 2 Severe Obesity (BMI >=35 and <=39.9). Obesity-related health conditions include the following: none. Obesity is unchanged. BMI is is above average;  BMI management plan is completed. We discussed portion control and increasing exercise.       ASSESSMENT & PLAN    Diagnoses and all orders for this visit:    1. Annual physical exam (Primary)    2. Gastroesophageal reflux disease without esophagitis  Comments:  Symptoms well controlled with current medication regimen, cont. Current meds.  Orders:  -     omeprazole (priLOSEC) 40 MG capsule; Take 1 capsule by mouth Daily.  Dispense: 90 capsule; Refill: 1    3. Folic acid deficiency  -     folic acid (FOLVITE) 1 MG tablet; Take 1 tablet by mouth Daily.  Dispense: 90 tablet; Refill: 1    4. Persistent depressive disorder  Comments:  reviewed genesight results with patient.  Patient mildly deficient in folic acid, advised to take folic acid supplement daily, continue daily effexor    5. Anxiety  Comments:  reviewed genesight results with patient. Patient mildly deficient in folic acid, advised to take folic acid supplement daily, continue daily effexor     The patient is advised to continue current medications, continue current healthy lifestyle patterns, and return for routine annual checkups.      Tobacco Use: Medium Risk (10/16/2024)    Patient History     Smoking Tobacco Use: Never     Smokeless Tobacco Use: Never     Passive Exposure: Yes       Follow Up     Return in about 7 weeks (around 12/4/2024).      Patient was given instructions and counseling regarding her condition or for health maintenance advice. Please see specific information pulled into the AVS if appropriate.   I have reviewed information obtained and documented by others and I have confirmed the accuracy of this documented note.    MICHEAL Renae      Patient has been erroneously marked as diabetic. Based on the available clinical information, she does not have diabetes and should therefore be excluded from diabetic health maintenance and quality measures for the remainder of the reporting period.

## 2024-11-05 ENCOUNTER — OFFICE VISIT (OUTPATIENT)
Dept: GASTROENTEROLOGY | Facility: CLINIC | Age: 29
End: 2024-11-05
Payer: COMMERCIAL

## 2024-11-05 VITALS
HEART RATE: 75 BPM | WEIGHT: 214.8 LBS | HEIGHT: 64 IN | DIASTOLIC BLOOD PRESSURE: 74 MMHG | SYSTOLIC BLOOD PRESSURE: 114 MMHG | BODY MASS INDEX: 36.67 KG/M2

## 2024-11-05 DIAGNOSIS — R19.7 DIARRHEA, UNSPECIFIED TYPE: Primary | ICD-10-CM

## 2024-11-05 DIAGNOSIS — K21.9 GASTROESOPHAGEAL REFLUX DISEASE, UNSPECIFIED WHETHER ESOPHAGITIS PRESENT: ICD-10-CM

## 2024-11-05 DIAGNOSIS — K64.9 HEMORRHOIDS, UNSPECIFIED HEMORRHOID TYPE: ICD-10-CM

## 2024-11-05 DIAGNOSIS — K62.5 RECTAL BLEEDING: ICD-10-CM

## 2024-11-05 PROCEDURE — 99214 OFFICE O/P EST MOD 30 MIN: CPT | Performed by: NURSE PRACTITIONER

## 2024-11-05 NOTE — PROGRESS NOTES
For Chief Complaint   Diarrhea, Heartburn, and Nausea    History of Present Illness       Kinsey Surseh is a 29 y.o. female who presents to [facility] for follow-up for diarrhea. She was last seen in the office by me on 5/15/24.     She admits lately she has been having issues with painful stools and rectal bleeding. She admits when she takes supplements she has been having issues with diarrhea. She is also having abd cramping as well. When she had the bleeding she was having loose stools. Stools have been more solid lately when she takes less of the supplements. Last episode of rectal bleeding was last month. Last diarrhea was last weekend.      Last colonoscopy was done by Dr. Yip on 7/11/2022 for chronic diarrhea.  Colonoscopy was normal.  Path was negative. History colon polyps.      History of C. difficile     History of fatty liver disease----She had a CT scan of the abdomen and pelvis done on 3/8/2024 that was negative for any acute abdominal process.     History of GERD--- omeprazole 40 mg and Pepcid 20 mg, Carafate---She is doing better on the that regimen. Will have occasional breakthrough reflux but its been better lately. She denies any dysphagia with food. But will have issues swallowing pills.         Gi FH---Paternal grandfather with colon cancer. Father with throat cancer.     Never got a call from Dr. Webb about referral. Has been having worsening diarrhea since finishing ABX for URI. Is worried it could be CDIFF again. She has been treated for C-DIFF in the past. Is having worsening reflux lately. Taking omeprazole 40 mg daily. Its wearing off during the day. Is using pepcid PRN.   Results       Result Review :       CMP          2/18/2024    23:22 4/2/2024    20:20 8/12/2024    16:39   CMP   Glucose 139  96  101    BUN 9  7  13    Creatinine 0.90  0.88  0.89    EGFR 90.6  93.1  91.3    Sodium 140  142  144    Potassium 3.4  3.9  3.9    Chloride 102  104   "106    Calcium 9.1  9.1  9.7    Total Protein 6.9  6.9  7.1    Albumin 4.1  4.0  4.3    Globulin 2.8  2.9  2.8    Total Bilirubin 0.3  0.2  0.4    Alkaline Phosphatase 117  101  104    AST (SGOT) 15  19  18    ALT (SGPT) 18  21  18    Albumin/Globulin Ratio 1.5  1.4  1.5    BUN/Creatinine Ratio 10.0  8.0  14.6    Anion Gap 13.0  11.1  12.6      CBC          2/18/2024    23:22 4/2/2024    20:20 8/12/2024    16:39   CBC   WBC 11.33  8.19  7.48    RBC 5.12  5.11  5.10    Hemoglobin 14.6  14.6  14.7    Hematocrit 45.0  46.0  45.0    MCV 87.9  90.0  88.2    MCH 28.5  28.6  28.8    MCHC 32.4  31.7  32.7    RDW 12.2  11.9  11.9    Platelets 285  243  240      CBC w/diff          2/18/2024    23:22 4/2/2024    20:20 8/12/2024    16:39   CBC w/Diff   WBC 11.33  8.19  7.48    RBC 5.12  5.11  5.10    Hemoglobin 14.6  14.6  14.7    Hematocrit 45.0  46.0  45.0    MCV 87.9  90.0  88.2    MCH 28.5  28.6  28.8    MCHC 32.4  31.7  32.7    RDW 12.2  11.9  11.9    Platelets 285  243  240    Neutrophil Rel % 50.1  49.6  60.3    Immature Granulocyte Rel % 0.4  0.1  0.4    Lymphocyte Rel % 40.2  40.8  30.9    Monocyte Rel % 7.1  6.2  6.0    Eosinophil Rel % 1.5  2.4  1.5    Basophil Rel % 0.7  0.9  0.9        TSH          5/14/2024    10:33   TSH   TSH 2.040        Lipase   Lipase   Date Value Ref Range Status   04/02/2024 30 13 - 60 U/L Final     Amylase No results found for: \"AMYLASE\"  Iron Profile No results found for: \"IRON\", \"TIBC\", \"LABIRON\", \"TRANSFERRIN\"  Ferritin No results found for: \"FERRITIN\"  ESR (Sed Rate)   Sed Rate   Date Value Ref Range Status   03/10/2019 6 0 - 20 mm/h Final     CRP (C-Reactive) No results found for: \"CRP\"  Liver Workup   Protime   Date Value Ref Range Status   01/25/2020 10.6 9.4 - 12.0 s Final     INR   Date Value Ref Range Status   01/25/2020 0.98 (L) 2.00 - 3.00 NA Final     Comment:     Suggested Therapeutic Ranges For Oral Anticoagulant Therapy:  Level of Therapy                      INR Target " Range  Standard Dose                            2.0-3.0  High Dose                                2.5-3.5  Patients not receiving anticoagulant  Therapy Normal Range                     0.6-1.2                 Past Medical History       Past Medical History:   Diagnosis Date    Anxiety     Asthma     PRN INHALERS    Borderline diabetic     NO MEDICATIONS AS OF YET TRYING CHANGE OF DIET- DOESN'T CHECK BG AT HOME    Clostridium difficile diarrhea 01/30/2022    DENIES ANY CURRENT ISSUES    Depression     GERD (gastroesophageal reflux disease)     HL (hearing loss)     Hypertension     Irritable bowel syndrome     Kidney stones     Migraine without aura     Obesity     Ovarian cyst     right ovary     PONV (postoperative nausea and vomiting)     PVC (premature ventricular contraction)     FOLLOWED BY DR FIGUEROA HAS SCHEDULED ECHO ON 7/3/23 . REPORTS ISSUES WITH UPPER LEFT CHEST PAIN BUT STATES IS SORE WITH PALPATION ALSO REPORTS SOA WITH EXERTION. ACTIVE WORKS FULL TIME    Right ventricular cardiac abnormality     DILATATION PER DR FIGUEROA NOTE - DENIED CP/SOB    Seasonal allergies     Sleep apnea     DOES NOT USE CPAP    Urinary tract infection     NO CURRENT ISSUES       Past Surgical History:   Procedure Laterality Date    BILATERAL BREAST REDUCTION Bilateral 7/11/2023    Procedure: BREAST REDUCTION BILATERAL EXCISION OF LEFT UPPER CHEST MOLE;  Surgeon: Mariah Lieberman MD;  Location: [facility] OR [facility];  Service: Plastics;  Laterality: Bilateral;    COLONOSCOPY  2020    COLONOSCOPY N/A 7/11/2022    Procedure: COLONOSCOPY BIOPSY;  Surgeon: David Yip MD;  Location: [facility] ENDOSCOPY;  Service: Gastroenterology;  Laterality: N/A;  NORMAL COLON    ENDOSCOPY      EXCISION LESION Left 7/11/2023    Procedure: BREAST REDUCTION BILATERAL EXCISION OF LEFT UPPER CHEST MOLE;  Surgeon: Mariah Lieberman MD;  Location: [facility] OR [facility];  Service: Plastics;  Laterality: Left;    KIDNEY STONE SURGERY  11/2021     URETEROSCOPY LASER LITHOTRIPSY WITH STENT INSERTION Left 11/13/2021    Procedure: URETEROSCOPY LASER LITHOTRIPSY WITH STENT INSERTION STONE BASKET EXTRACTION;  Surgeon: Colt Sanders MD;  Location: [facility];  Service: Urology;  Laterality: Left;    URETEROSCOPY LASER LITHOTRIPSY WITH STENT INSERTION Right 8/18/2023    Procedure: CYSTOSCOPY URETEROSCOPY RETROGRADE PYELOGRAM HOLMIUM LASER STENT INSERTION, right;  Surgeon: Bailee Edgar MD;  Location: [facility] MAIN OR;  Service: Urology;  Laterality: Right;         Current Outpatient Medications:     albuterol sulfate  (90 Base) MCG/ACT inhaler, INHALE 2 PUFFS INTO THE LUNGS EVERY 4 (FOUR) HOURS AS NEEDED FOR SHORTNESS OF AIR., Disp: , Rfl:     bisoprolol (ZEBeta) 5 MG tablet, Take 0.5 tablets by mouth Daily., Disp: 45 tablet, Rfl: 0    cetirizine (zyrTEC) 10 MG tablet, Take 1 tablet by mouth Daily., Disp: 30 tablet, Rfl: 0    dicyclomine (BENTYL) 10 MG capsule, Take 2 capsules by mouth 3 (Three) Times a Day As Needed for Abdominal Cramping., Disp: 120 capsule, Rfl: 1    famotidine (PEPCID) 20 MG tablet, TAKE 1 TABLET BY MOUTH TWICE A DAY, Disp: 180 tablet, Rfl: 1    fluticasone (FLONASE) 50 MCG/ACT nasal spray, 1 spray by Each Nare route Daily As Needed for Rhinitis or Allergies for up to 30 days., Disp: 18.2 mL, Rfl: 0    folic acid (FOLVITE) 1 MG tablet, Take 1 tablet by mouth Daily., Disp: 90 tablet, Rfl: 1    omeprazole (priLOSEC) 40 MG capsule, Take 1 capsule by mouth Daily., Disp: 90 capsule, Rfl: 1    ondansetron ODT (ZOFRAN-ODT) 4 MG disintegrating tablet, Place 1 tablet on the tongue Every 6 (Six) Hours As Needed for Vomiting., Disp: 15 tablet, Rfl: 0    sucralfate (Carafate) 1 g tablet, Take 1 tablet by mouth 3 (Three) Times a Day., Disp: 90 tablet, Rfl: 1    venlafaxine (EFFEXOR) 75 MG tablet, Take 1 tablet by mouth Daily., Disp: 90 tablet, Rfl: 1     Allergies   Allergen Reactions    Ciprofloxacin Rash    Doxycycline Hyclate Rash  "   Doxycycline Monohydrate Rash    Latex Rash    Sulfa Antibiotics Rash    Sulfamethoxazole-Trimethoprim Hives and Rash    Trimethoprim Rash       Family History   Problem Relation Age of Onset    Heart failure Mother     Early death Mother     Asthma Sister     Depression Sister     Vision loss Sister     Other Sister     Cancer Maternal Aunt     Skin cancer Maternal Grandmother     Anxiety disorder Maternal Grandmother     Colon cancer Maternal Grandfather     Heart failure Maternal Grandfather     Diabetes Maternal Grandfather     Heart disease Maternal Grandfather     Hyperlipidemia Maternal Grandfather     Colon cancer Paternal Grandfather     Heart disease Maternal Great-Grandfather     Breast cancer Other 60    Ovarian cancer Neg Hx     Uterine cancer Neg Hx     Prostate cancer Neg Hx         Social History     Social History Narrative    Not on file       Objective       Review of Systems   Constitutional:  Negative for appetite change, fatigue, fever, unexpected weight gain and unexpected weight loss.   HENT:  Negative for trouble swallowing.    Respiratory:  Negative for cough, choking, chest tightness, shortness of breath, wheezing and stridor.    Cardiovascular:  Negative for chest pain, palpitations and leg swelling.   Gastrointestinal:  Positive for anal bleeding, diarrhea, nausea, GERD and indigestion. Negative for abdominal distention, abdominal pain, blood in stool, constipation, rectal pain and vomiting.        Vital Signs:   /74 (BP Location: Left arm, Patient Position: Sitting, Cuff Size: Adult)   Pulse 75   Ht 162.6 cm (64\")   Wt 97.4 kg (214 lb 12.8 oz)   BMI 36.87 kg/m²       Physical Exam  Constitutional:       General: She is not in acute distress.     Appearance: She is well-developed. She is not ill-appearing.   HENT:      Head: Normocephalic.   Eyes:      Pupils: Pupils are equal, round, and reactive to light.   Cardiovascular:      Rate and Rhythm: Normal rate and regular " rhythm.      Heart sounds: Normal heart sounds.   Pulmonary:      Effort: Pulmonary effort is normal.      Breath sounds: Normal breath sounds.   Abdominal:      General: Bowel sounds are normal. There is no distension.      Palpations: Abdomen is soft. There is no mass.      Tenderness: There is no abdominal tenderness. There is no guarding or rebound.      Hernia: No hernia is present.   Musculoskeletal:         General: Normal range of motion.   Skin:     General: Skin is warm and dry.   Neurological:      Mental Status: She is alert and oriented to person, place, and time.   Psychiatric:         Speech: Speech normal.         Behavior: Behavior normal.         Judgment: Judgment normal.           Assessment & Plan          Assessment and Plan    Diagnoses and all orders for this visit:    1. Diarrhea, unspecified type (Primary)  -     Calprotectin, Fecal - Stool, Per Rectum; Future  -     Clostridioides difficile Toxin - Stool, Per Rectum; Future  -     Enteric Bacterial Panel - Stool, Per Rectum; Future  -     Enteric Parasite Panel - Stool, Per Rectum; Future    2. Rectal bleeding  -     Ambulatory Referral to General Surgery    3. Gastroesophageal reflux disease, unspecified whether esophagitis present    4. Hemorrhoids, unspecified hemorrhoid type  -     Ambulatory Referral to General Surgery    Reviewed medical history with her today.  Since she does have a history of C. difficile and has recently been on antibiotics and now having diarrhea recommend checking stool studies.  Will place referral to Dr. Webb for rectal bleeding and hemorrhoids.  GERD is not well-controlled currently.  Continue omeprazole.  Increase Pepcid to 40 mg twice daily as needed.  Continue GERD precautions.  Patient to call the office next week with an update.  Patient to follow-up with me in 3 months.  Patient is agreeable to the plan.          Follow Up       Follow Up   Return in about 3 months (around 2/5/2025) for DIARRHEA,  GERD.  Patient was given instructions and counseling regarding her condition or for health maintenance advice. Please see specific information pulled into the AVS if appropriate.

## 2024-11-25 ENCOUNTER — TELEPHONE (OUTPATIENT)
Dept: GASTROENTEROLOGY | Facility: CLINIC | Age: 29
End: 2024-11-25
Payer: COMMERCIAL

## 2024-12-02 ENCOUNTER — TELEPHONE (OUTPATIENT)
Dept: SURGERY | Facility: CLINIC | Age: 29
End: 2024-12-02
Payer: COMMERCIAL

## 2024-12-02 NOTE — TELEPHONE ENCOUNTER
LMOM     CALLED TO SEE IF PATIENT WOULD BE INTERESTED IN COMING IN EARLIER ON WEDNESDAY. CAN DO ANYWHERE FROM 1-230.

## 2024-12-03 NOTE — TELEPHONE ENCOUNTER
PATIENT CALLED.  I TOLD HER, PER YODIT:     CALLED TO SEE IF PATIENT WOULD BE INTERESTED IN COMING IN EARLIER ON WEDNESDAY. CAN DO ANYWHERE FROM 1-230.      PATIENT SAID SHE WILL COME AT 2:30 TOMORROW.     PLEASE RESCHEDULE THE APPOINTMENT.

## 2024-12-19 DIAGNOSIS — R00.2 PALPITATIONS: ICD-10-CM

## 2024-12-20 RX ORDER — BISOPROLOL FUMARATE 5 MG/1
2.5 TABLET, FILM COATED ORAL DAILY
Qty: 45 TABLET | Refills: 1 | Status: SHIPPED | OUTPATIENT
Start: 2024-12-20

## 2024-12-30 PROCEDURE — 87635 SARS-COV-2 COVID-19 AMP PRB: CPT | Performed by: FAMILY MEDICINE

## 2024-12-30 PROCEDURE — 87081 CULTURE SCREEN ONLY: CPT | Performed by: FAMILY MEDICINE

## 2024-12-31 ENCOUNTER — LAB (OUTPATIENT)
Dept: LAB | Facility: HOSPITAL | Age: 29
End: 2024-12-31
Payer: COMMERCIAL

## 2024-12-31 ENCOUNTER — OFFICE VISIT (OUTPATIENT)
Dept: FAMILY MEDICINE CLINIC | Facility: CLINIC | Age: 29
End: 2024-12-31
Payer: COMMERCIAL

## 2024-12-31 ENCOUNTER — TELEPHONE (OUTPATIENT)
Dept: FAMILY MEDICINE CLINIC | Facility: CLINIC | Age: 29
End: 2024-12-31

## 2024-12-31 VITALS
TEMPERATURE: 98.1 F | WEIGHT: 221.2 LBS | OXYGEN SATURATION: 98 % | BODY MASS INDEX: 37.76 KG/M2 | SYSTOLIC BLOOD PRESSURE: 123 MMHG | HEART RATE: 103 BPM | HEIGHT: 64 IN | DIASTOLIC BLOOD PRESSURE: 81 MMHG

## 2024-12-31 DIAGNOSIS — R53.83 OTHER FATIGUE: ICD-10-CM

## 2024-12-31 DIAGNOSIS — R05.9 COUGH, UNSPECIFIED TYPE: ICD-10-CM

## 2024-12-31 DIAGNOSIS — F32.A ANXIETY AND DEPRESSION: ICD-10-CM

## 2024-12-31 DIAGNOSIS — E53.8 FOLIC ACID DEFICIENCY: ICD-10-CM

## 2024-12-31 DIAGNOSIS — F41.9 ANXIETY AND DEPRESSION: ICD-10-CM

## 2024-12-31 DIAGNOSIS — J02.9 PHARYNGITIS, UNSPECIFIED ETIOLOGY: ICD-10-CM

## 2024-12-31 DIAGNOSIS — F41.9 ANXIETY: Primary | ICD-10-CM

## 2024-12-31 DIAGNOSIS — F34.1 PERSISTENT DEPRESSIVE DISORDER: ICD-10-CM

## 2024-12-31 LAB — HETEROPH AB SER QL LA: NEGATIVE

## 2024-12-31 PROCEDURE — 99214 OFFICE O/P EST MOD 30 MIN: CPT | Performed by: NURSE PRACTITIONER

## 2024-12-31 PROCEDURE — 86308 HETEROPHILE ANTIBODY SCREEN: CPT

## 2024-12-31 PROCEDURE — 36415 COLL VENOUS BLD VENIPUNCTURE: CPT

## 2024-12-31 RX ORDER — BROMPHENIRAMINE MALEATE, PSEUDOEPHEDRINE HYDROCHLORIDE, AND DEXTROMETHORPHAN HYDROBROMIDE 2; 30; 10 MG/5ML; MG/5ML; MG/5ML
5 SYRUP ORAL 4 TIMES DAILY PRN
Qty: 118 ML | Refills: 0 | Status: SHIPPED | OUTPATIENT
Start: 2024-12-31

## 2024-12-31 RX ORDER — FOLIC ACID 1 MG/1
1 TABLET ORAL DAILY
Qty: 90 TABLET | Refills: 1 | Status: SHIPPED | OUTPATIENT
Start: 2024-12-31

## 2024-12-31 RX ORDER — VENLAFAXINE 75 MG/1
75 TABLET ORAL DAILY
Qty: 90 TABLET | Refills: 1 | Status: SHIPPED | OUTPATIENT
Start: 2024-12-31

## 2024-12-31 NOTE — PROGRESS NOTES
Chief Complaint  Follow-up (8 weeks), Anxiety, and Depression    SUBJECTIVE  Kinsey Suresh presents to [facility]    Anxiety/Depression:  Patient is taking Venlafaxine, Folic Acid - new medication at last office visit.  Patient states she is doing well, symptoms well controlled on current medications.  Patient denies suicidal ideations or thoughts of harming herself or others.    Pharyngitis-pt went to urgent care 12/16 and 12/30. All swabs were negative.   History of Present Illness  Past Medical History:   Diagnosis Date    Allergic 2012    Anxiety     Asthma     PRN INHALERS    Borderline diabetic     NO MEDICATIONS AS OF YET TRYING CHANGE OF DIET- DOESN'T CHECK BG AT HOME    Clostridium difficile diarrhea 01/30/2022    DENIES ANY CURRENT ISSUES    Colon polyp 2020    Depression     GERD (gastroesophageal reflux disease)     HL (hearing loss)     Hypertension     Irritable bowel syndrome     Kidney stones     Migraine without aura     Obesity     Ovarian cyst     right ovary     PONV (postoperative nausea and vomiting)     PVC (premature ventricular contraction)     FOLLOWED BY DR FIGUEROA HAS SCHEDULED ECHO ON 7/3/23 . REPORTS ISSUES WITH UPPER LEFT CHEST PAIN BUT STATES IS SORE WITH PALPATION ALSO REPORTS SOA WITH EXERTION. ACTIVE WORKS FULL TIME    Right ventricular cardiac abnormality     DILATATION PER DR FIGUEROA NOTE - DENIED CP/SOB    Seasonal allergies     Sleep apnea     DOES NOT USE CPAP    Urinary tract infection     NO CURRENT ISSUES      Family History   Problem Relation Age of Onset    Heart failure Mother     Early death Mother     Mental illness Mother     Asthma Sister     Depression Sister     Vision loss Sister     Cancer Maternal Aunt     Skin cancer Maternal Grandmother     Anxiety disorder Maternal Grandmother     Hypertension Maternal Grandmother     Vision loss Maternal Grandmother     Colon cancer Maternal Grandfather     Heart failure Maternal  Grandfather     Diabetes Maternal Grandfather     Heart disease Maternal Grandfather     Hyperlipidemia Maternal Grandfather     Colon cancer Paternal Grandfather     Heart disease Maternal Great-Grandfather     Breast cancer Other 60    Alcohol abuse Maternal Uncle     Drug abuse Maternal Uncle     Ovarian cancer Neg Hx     Uterine cancer Neg Hx     Prostate cancer Neg Hx       Past Surgical History:   Procedure Laterality Date    BILATERAL BREAST REDUCTION Bilateral 07/11/2023    Procedure: BREAST REDUCTION BILATERAL EXCISION OF LEFT UPPER CHEST MOLE;  Surgeon: Mariah Lieberman MD;  Location: [facility] OR [facility];  Service: Plastics;  Laterality: Bilateral;    BREAST SURGERY  2023    COLONOSCOPY  2020    COLONOSCOPY N/A 07/11/2022    Procedure: COLONOSCOPY BIOPSY;  Surgeon: David Yip MD;  Location: [facility] ENDOSCOPY;  Service: Gastroenterology;  Laterality: N/A;  NORMAL COLON    ENDOSCOPY      EXCISION LESION Left 07/11/2023    Procedure: BREAST REDUCTION BILATERAL EXCISION OF LEFT UPPER CHEST MOLE;  Surgeon: Mariah Lieberman MD;  Location: [facility] OR [facility];  Service: Plastics;  Laterality: Left;    KIDNEY STONE SURGERY  11/2021    URETEROSCOPY LASER LITHOTRIPSY WITH STENT INSERTION Left 11/13/2021    Procedure: URETEROSCOPY LASER LITHOTRIPSY WITH STENT INSERTION STONE BASKET EXTRACTION;  Surgeon: Colt Sanders MD;  Location: [facility] MAIN OR;  Service: Urology;  Laterality: Left;    URETEROSCOPY LASER LITHOTRIPSY WITH STENT INSERTION Right 08/18/2023    Procedure: CYSTOSCOPY URETEROSCOPY RETROGRADE PYELOGRAM HOLMIUM LASER STENT INSERTION, right;  Surgeon: Bailee Edgar MD;  Location: [facility] MAIN OR;  Service: Urology;  Laterality: Right;        Current Outpatient Medications:     albuterol sulfate  (90 Base) MCG/ACT inhaler, INHALE 2 PUFFS INTO THE LUNGS EVERY 4 (FOUR) HOURS AS NEEDED FOR SHORTNESS OF AIR., Disp: , Rfl:     bisoprolol (ZEBeta) 5 MG tablet, TAKE 1/2 TABLET BY MOUTH  "DAILY, Disp: 45 tablet, Rfl: 1    cetirizine (zyrTEC) 10 MG tablet, Take 1 tablet by mouth Daily., Disp: 30 tablet, Rfl: 0    dicyclomine (BENTYL) 10 MG capsule, Take 2 capsules by mouth 3 (Three) Times a Day As Needed for Abdominal Cramping., Disp: 120 capsule, Rfl: 1    famotidine (PEPCID) 20 MG tablet, TAKE 1 TABLET BY MOUTH TWICE A DAY, Disp: 180 tablet, Rfl: 1    fluticasone (FLONASE) 50 MCG/ACT nasal spray, 1 spray by Each Nare route Daily As Needed for Rhinitis or Allergies for up to 30 days., Disp: 18.2 mL, Rfl: 0    folic acid (FOLVITE) 1 MG tablet, Take 1 tablet by mouth Daily., Disp: 90 tablet, Rfl: 1    norethindrone (MICRONOR) 0.35 MG tablet, , Disp: , Rfl:     omeprazole (priLOSEC) 40 MG capsule, Take 1 capsule by mouth Daily., Disp: 90 capsule, Rfl: 1    ondansetron ODT (ZOFRAN-ODT) 4 MG disintegrating tablet, Place 1 tablet on the tongue Every 6 (Six) Hours As Needed for Vomiting., Disp: 15 tablet, Rfl: 0    sucralfate (Carafate) 1 g tablet, Take 1 tablet by mouth 3 (Three) Times a Day., Disp: 90 tablet, Rfl: 1    venlafaxine (EFFEXOR) 75 MG tablet, Take 1 tablet by mouth Daily., Disp: 90 tablet, Rfl: 1    brompheniramine-pseudoephedrine-DM 30-2-10 MG/5ML syrup, Take 5 mL by mouth 4 (Four) Times a Day As Needed for Congestion or Cough., Disp: 118 mL, Rfl: 0    OBJECTIVE  Vital Signs:   /81 (BP Location: Left arm, Patient Position: Sitting, Cuff Size: Large Adult)   Pulse 103   Temp 98.1 °F (36.7 °C) (Temporal)   Ht 162.6 cm (64\")   Wt 100 kg (221 lb 3.2 oz)   LMP 12/16/2024 (Approximate)   SpO2 98%   BMI 37.97 kg/m²    Estimated body mass index is 37.97 kg/m² as calculated from the following:    Height as of this encounter: 162.6 cm (64\").    Weight as of this encounter: 100 kg (221 lb 3.2 oz).     Wt Readings from Last 3 Encounters:   12/31/24 100 kg (221 lb 3.2 oz)   12/30/24 99.8 kg (220 lb)   12/16/24 100 kg (220 lb 8 oz)     BP Readings from Last 3 Encounters:   12/31/24 123/81 "   12/30/24 129/89   12/16/24 135/83       Physical Exam  Vitals reviewed.   Constitutional:       Appearance: Normal appearance. She is well-developed.   HENT:      Head: Normocephalic and atraumatic.      Right Ear: Hearing, tympanic membrane, ear canal and external ear normal.      Left Ear: Hearing, tympanic membrane, ear canal and external ear normal.      Mouth/Throat:      Pharynx: Postnasal drip present. No oropharyngeal exudate.   Eyes:      Conjunctiva/sclera: Conjunctivae normal.      Pupils: Pupils are equal, round, and reactive to light.   Cardiovascular:      Rate and Rhythm: Normal rate and regular rhythm.      Heart sounds: No murmur heard.     No friction rub. No gallop.   Pulmonary:      Effort: Pulmonary effort is normal.      Breath sounds: Normal breath sounds. No wheezing or rhonchi.   Skin:     General: Skin is warm and dry.   Neurological:      Mental Status: She is alert and oriented to person, place, and time.      Cranial Nerves: No cranial nerve deficit.   Psychiatric:         Mood and Affect: Mood and affect normal.         Behavior: Behavior normal.         Thought Content: Thought content normal.         Judgment: Judgment normal.          Result Review        No Images in the past 120 days found..      The above data has been reviewed by MICHEAL Renae 12/31/2024 09:54 EST.          Patient Care Team:  Sandi Gerardo APRN as PCP - General (Family Medicine)  Felicita Edward APRN (Nurse Practitioner)  Bailee Edgar MD as Consulting Physician (Urology)  Makenzie Sigala APRN as Nurse Practitioner (Nurse Practitioner)            ASSESSMENT & PLAN    Diagnoses and all orders for this visit:    1. Anxiety (Primary)  Comments:  Stable and well-controlled, continue current medication    2. Folic acid deficiency  Comments:  Continue daily supplement  Orders:  -     folic acid (FOLVITE) 1 MG tablet; Take 1 tablet by mouth Daily.  Dispense: 90 tablet; Refill: 1    3. Anxiety  and depression  Comments:  Stable and well-controlled, continue current medication  Orders:  -     venlafaxine (EFFEXOR) 75 MG tablet; Take 1 tablet by mouth Daily.  Dispense: 90 tablet; Refill: 1    4. Persistent depressive disorder  Comments:  Stable and well-controlled, continue current medication    5. Pharyngitis, unspecified etiology  Comments:  Will check mononucleosis test and respiratory swab.  Orders:  -     brompheniramine-pseudoephedrine-DM 30-2-10 MG/5ML syrup; Take 5 mL by mouth 4 (Four) Times a Day As Needed for Congestion or Cough.  Dispense: 118 mL; Refill: 0  -     Mononucleosis Screen; Future  -     Throat / Upper Respiratory Culture - Swab, Throat; Future    6. Cough, unspecified type  -     Mononucleosis Screen; Future    7. Other fatigue  -     Mononucleosis Screen; Future         Tobacco Use: Medium Risk (12/31/2024)    Patient History     Smoking Tobacco Use: Never     Smokeless Tobacco Use: Never     Passive Exposure: Current       Follow Up     Return if symptoms worsen or fail to improve, for Next scheduled follow up.      Patient was given instructions and counseling regarding her condition or for health maintenance advice. Please see specific information pulled into the AVS if appropriate.   I have reviewed information obtained and documented by others and I have confirmed the accuracy of this documented note.    Sandi Gerardo, MICHEAL        Answers submitted by the patient for this visit:  Primary Reason for Visit (Submitted on 12/30/2024)  What is the primary reason for your visit?: Back Pain

## 2024-12-31 NOTE — TELEPHONE ENCOUNTER
Caller: Kinsey Suresh    Relationship to patient: Self    Best call back number: [phone]    Patient is needing: PATIENT STATES SHE WAS JUST SEEN IN THE OFFICE AND TOLD TO GO OVER TO THE [facility] TO HAVE SWAB DONE THAT THE PCP ORDERED. PATIENT STATES THE LAB INFORMED HER THEY DO NOT DO ANY SWABS AND ARE UNABLE TO COMPLETE THIS ORDER. PATIENT WANTING TO KNOW IF SHE NEEDS TO COME BACK INTO THE OFFICE.

## 2025-01-02 NOTE — TELEPHONE ENCOUNTER
Patient informed and voiced understanding. Patient states she will come in tomorrow for the throat swab.

## 2025-01-10 PROCEDURE — 87510 GARDNER VAG DNA DIR PROBE: CPT | Performed by: FAMILY MEDICINE

## 2025-01-10 PROCEDURE — 87660 TRICHOMONAS VAGIN DIR PROBE: CPT | Performed by: FAMILY MEDICINE

## 2025-01-10 PROCEDURE — 87491 CHLMYD TRACH DNA AMP PROBE: CPT | Performed by: FAMILY MEDICINE

## 2025-01-10 PROCEDURE — 87591 N.GONORRHOEAE DNA AMP PROB: CPT | Performed by: FAMILY MEDICINE

## 2025-01-10 PROCEDURE — 87480 CANDIDA DNA DIR PROBE: CPT | Performed by: FAMILY MEDICINE

## 2025-01-11 ENCOUNTER — TELEPHONE (OUTPATIENT)
Dept: URGENT CARE | Facility: CLINIC | Age: 30
End: 2025-01-11
Payer: COMMERCIAL

## 2025-01-11 NOTE — TELEPHONE ENCOUNTER
----- Message from Tod Carl sent at 1/10/2025  2:30 PM EST -----  I spoke to the patient.  She does have BV.  We have already prescribed clindamycin.  I told her if the symptoms do not improve she needs to follow-up with her primary care.  She voiced understanding.   Health Maintenance Due   Topic Date Due    IPV Vaccine (3 of 4 - 4-dose series) 03/21/2024    Rotavirus Vaccine (3 of 3 - 3-dose series) 03/21/2024    DTaP/Tdap/Td Vaccine (3 - DTaP) 03/21/2024    COVID-19 Vaccine (1) Never done    Hepatitis B Vaccine (3 of 3 - 3-dose series) 03/28/2024    Well Child Exam 6 Months  03/21/2024    Pneumococcal Vaccine 0-64 (3 of 4 - PCV) 03/21/2024       Patient is due for the topics as listed above and wishes to proceed with them. Patient is following pediatric unified immunization schedule for immunizations.

## 2025-02-05 ENCOUNTER — TELEPHONE (OUTPATIENT)
Dept: GASTROENTEROLOGY | Facility: CLINIC | Age: 30
End: 2025-02-05

## 2025-02-05 ENCOUNTER — HOSPITAL ENCOUNTER (OUTPATIENT)
Dept: GENERAL RADIOLOGY | Facility: HOSPITAL | Age: 30
Discharge: HOME | End: 2025-02-05
Payer: COMMERCIAL

## 2025-02-05 ENCOUNTER — OFFICE VISIT (OUTPATIENT)
Dept: FAMILY MEDICINE CLINIC | Facility: CLINIC | Age: 30
End: 2025-02-05
Payer: COMMERCIAL

## 2025-02-05 VITALS
BODY MASS INDEX: 38.65 KG/M2 | DIASTOLIC BLOOD PRESSURE: 53 MMHG | SYSTOLIC BLOOD PRESSURE: 115 MMHG | HEIGHT: 64 IN | TEMPERATURE: 98.4 F | OXYGEN SATURATION: 97 % | WEIGHT: 226.4 LBS | HEART RATE: 68 BPM

## 2025-02-05 DIAGNOSIS — N89.8 VAGINAL DISCHARGE: Primary | ICD-10-CM

## 2025-02-05 DIAGNOSIS — M54.50 LUMBAR BACK PAIN: ICD-10-CM

## 2025-02-05 DIAGNOSIS — M54.6 CHRONIC MIDLINE THORACIC BACK PAIN: ICD-10-CM

## 2025-02-05 DIAGNOSIS — M54.2 CERVICAL PAIN (NECK): ICD-10-CM

## 2025-02-05 DIAGNOSIS — G89.29 CHRONIC MIDLINE THORACIC BACK PAIN: ICD-10-CM

## 2025-02-05 DIAGNOSIS — K21.9 GASTROESOPHAGEAL REFLUX DISEASE WITHOUT ESOPHAGITIS: ICD-10-CM

## 2025-02-05 PROBLEM — N62 MACROMASTIA: Status: RESOLVED | Noted: 2023-03-21 | Resolved: 2025-02-05

## 2025-02-05 PROBLEM — R30.0 BURNING WITH URINATION: Status: RESOLVED | Noted: 2024-09-23 | Resolved: 2025-02-05

## 2025-02-05 PROCEDURE — 72050 X-RAY EXAM NECK SPINE 4/5VWS: CPT

## 2025-02-05 PROCEDURE — 72110 X-RAY EXAM L-2 SPINE 4/>VWS: CPT

## 2025-02-05 PROCEDURE — 87660 TRICHOMONAS VAGIN DIR PROBE: CPT | Performed by: NURSE PRACTITIONER

## 2025-02-05 PROCEDURE — 87480 CANDIDA DNA DIR PROBE: CPT | Performed by: NURSE PRACTITIONER

## 2025-02-05 PROCEDURE — 87510 GARDNER VAG DNA DIR PROBE: CPT | Performed by: NURSE PRACTITIONER

## 2025-02-05 PROCEDURE — 72072 X-RAY EXAM THORAC SPINE 3VWS: CPT

## 2025-02-05 PROCEDURE — 99214 OFFICE O/P EST MOD 30 MIN: CPT | Performed by: NURSE PRACTITIONER

## 2025-02-05 NOTE — TELEPHONE ENCOUNTER
Left a message for patient to call the office back as Makenzie Sigala will be out of office today and I am needing to reschedule patient.

## 2025-02-05 NOTE — PROGRESS NOTES
Chief Complaint  Neck Pain, Back Pain, and Headache    SUBJECTIVE  Kinsey Suresh presents to [facility]    Patient complaining of diffuse back and neck pain, chronic, NKI.  Patient states she saw a chiropractor who  told her she had a slight curvature to her spine and she was losing the curve in her neck.  Patient describes pain as constant, aching, burning, pinches nerves and shoots pain down right leg, neck pain causes headaches.  Patient rates pain at 8/10.    Back Pain  This is a chronic problem. The problem occurs daily. The pain is present in the gluteal, lumbar spine and thoracic spine. The quality of the pain is described as aching and burning. The pain radiates to the right buttock and right thigh. The pain is The same all the time. The symptoms are aggravated by sitting and position. Stiffness is present All day. Risk factors include lack of exercise and obesity. She has tried NSAIDs for the symptoms. The treatment provided significant relief.   Neck Pain   This is a chronic problem. The current episode started more than 1 year ago. The problem has been unchanged. Associated with: looking down at computer daily. The pain is present in the midline and occipital region. The quality of the pain is described as stabbing. The symptoms are aggravated by position. She has tried NSAIDs for the symptoms. The treatment provided moderate relief.       Patient complaining of vaginal discharge.  States she has been on 2 different antibiotics for BV.  States most recently she was on clindamycin per ER visit and positive vaginal swab.  Patient reports she has completed the clindamycin but is still continuing with vaginal discharge with odor.  She is currently not sexually active mainly due to pain with intercourse.  She does currently see gynecology.  She denies any douching or using any over-the-counter vaginal medications.  Past Medical History:   Diagnosis Date    Allergic 2012     Anxiety     Asthma     PRN INHALERS    Borderline diabetic     NO MEDICATIONS AS OF YET TRYING CHANGE OF DIET- DOESN'T CHECK BG AT HOME    Clostridium difficile diarrhea 01/30/2022    DENIES ANY CURRENT ISSUES    Colon polyp 2020    Depression     GERD (gastroesophageal reflux disease)     HL (hearing loss)     Hypertension     Irritable bowel syndrome     Kidney stones     Migraine without aura     Obesity     Ovarian cyst     right ovary     PONV (postoperative nausea and vomiting)     PVC (premature ventricular contraction)     FOLLOWED BY DR FIGUEROA HAS SCHEDULED ECHO ON 7/3/23 . REPORTS ISSUES WITH UPPER LEFT CHEST PAIN BUT STATES IS SORE WITH PALPATION ALSO REPORTS SOA WITH EXERTION. ACTIVE WORKS FULL TIME    Right ventricular cardiac abnormality     DILATATION PER DR FIGUEROA NOTE - DENIED CP/SOB    Seasonal allergies     Sleep apnea     DOES NOT USE CPAP    Urinary tract infection     NO CURRENT ISSUES      Family History   Problem Relation Age of Onset    Heart failure Mother     Early death Mother     Mental illness Mother     Asthma Sister     Depression Sister     Vision loss Sister     Cancer Maternal Aunt     Skin cancer Maternal Grandmother     Anxiety disorder Maternal Grandmother     Hypertension Maternal Grandmother     Vision loss Maternal Grandmother     Colon cancer Maternal Grandfather     Heart failure Maternal Grandfather     Diabetes Maternal Grandfather     Heart disease Maternal Grandfather     Hyperlipidemia Maternal Grandfather     Colon cancer Paternal Grandfather     Heart disease Maternal Great-Grandfather     Breast cancer Other 60    Alcohol abuse Maternal Uncle     Drug abuse Maternal Uncle     Ovarian cancer Neg Hx     Uterine cancer Neg Hx     Prostate cancer Neg Hx       Past Surgical History:   Procedure Laterality Date    BILATERAL BREAST REDUCTION Bilateral 07/11/2023    Procedure: BREAST REDUCTION BILATERAL EXCISION OF LEFT UPPER CHEST MOLE;  Surgeon: Mio  Mariah SZYMANSKI MD;  Location: [facility];  Service: Plastics;  Laterality: Bilateral;    BREAST SURGERY  2023    COLONOSCOPY  2020    COLONOSCOPY N/A 07/11/2022    Procedure: COLONOSCOPY BIOPSY;  Surgeon: David Yip MD;  Location: [facility];  Service: Gastroenterology;  Laterality: N/A;  NORMAL COLON    ENDOSCOPY      EXCISION LESION Left 07/11/2023    Procedure: BREAST REDUCTION BILATERAL EXCISION OF LEFT UPPER CHEST MOLE;  Surgeon: Mariah Lieberman MD;  Location: [facility];  Service: Plastics;  Laterality: Left;    KIDNEY STONE SURGERY  11/2021    URETEROSCOPY LASER LITHOTRIPSY WITH STENT INSERTION Left 11/13/2021    Procedure: URETEROSCOPY LASER LITHOTRIPSY WITH STENT INSERTION STONE BASKET EXTRACTION;  Surgeon: Colt Sanders MD;  Location: [facility] MAIN OR;  Service: Urology;  Laterality: Left;    URETEROSCOPY LASER LITHOTRIPSY WITH STENT INSERTION Right 08/18/2023    Procedure: CYSTOSCOPY URETEROSCOPY RETROGRADE PYELOGRAM HOLMIUM LASER STENT INSERTION, right;  Surgeon: Bailee Edgar MD;  Location: [facility] MAIN OR;  Service: Urology;  Laterality: Right;        Current Outpatient Medications:     albuterol sulfate  (90 Base) MCG/ACT inhaler, INHALE 2 PUFFS INTO THE LUNGS EVERY 4 (FOUR) HOURS AS NEEDED FOR SHORTNESS OF AIR., Disp: , Rfl:     bisoprolol (ZEBeta) 5 MG tablet, TAKE 1/2 TABLET BY MOUTH DAILY, Disp: 45 tablet, Rfl: 1    cetirizine (zyrTEC) 10 MG tablet, Take 1 tablet by mouth Daily., Disp: 30 tablet, Rfl: 0    dicyclomine (BENTYL) 10 MG capsule, Take 2 capsules by mouth 3 (Three) Times a Day As Needed for Abdominal Cramping., Disp: 120 capsule, Rfl: 1    famotidine (PEPCID) 20 MG tablet, TAKE 1 TABLET BY MOUTH TWICE A DAY, Disp: 180 tablet, Rfl: 1    fluticasone (FLONASE) 50 MCG/ACT nasal spray, 1 spray by Each Nare route Daily As Needed for Rhinitis or Allergies for up to 30 days., Disp: 18.2 mL, Rfl: 0    folic acid (FOLVITE) 1 MG tablet, Take 1 tablet by  "mouth Daily., Disp: 90 tablet, Rfl: 1    norethindrone (MICRONOR) 0.35 MG tablet, , Disp: , Rfl:     omeprazole (priLOSEC) 40 MG capsule, Take 1 capsule by mouth Daily., Disp: 90 capsule, Rfl: 1    ondansetron ODT (ZOFRAN-ODT) 4 MG disintegrating tablet, Place 1 tablet on the tongue Every 6 (Six) Hours As Needed for Vomiting., Disp: 15 tablet, Rfl: 0    venlafaxine (EFFEXOR) 75 MG tablet, Take 1 tablet by mouth Daily., Disp: 90 tablet, Rfl: 1    OBJECTIVE  Vital Signs:   /53 (BP Location: Left arm, Patient Position: Sitting, Cuff Size: Large Adult)   Pulse 68   Temp 98.4 °F (36.9 °C) (Temporal)   Ht 162.6 cm (64\")   Wt 103 kg (226 lb 6.4 oz)   SpO2 97%   BMI 38.86 kg/m²    Estimated body mass index is 38.86 kg/m² as calculated from the following:    Height as of this encounter: 162.6 cm (64\").    Weight as of this encounter: 103 kg (226 lb 6.4 oz).     Wt Readings from Last 3 Encounters:   02/05/25 103 kg (226 lb 6.4 oz)   01/10/25 100 kg (221 lb)   12/31/24 100 kg (221 lb 3.2 oz)     BP Readings from Last 3 Encounters:   02/05/25 115/53   01/10/25 142/94   12/31/24 123/81       Physical Exam  Vitals reviewed.   Constitutional:       Appearance: Normal appearance. She is well-developed.   HENT:      Head: Normocephalic and atraumatic.      Right Ear: External ear normal.      Left Ear: External ear normal.      Mouth/Throat:      Pharynx: No oropharyngeal exudate.   Eyes:      Conjunctiva/sclera: Conjunctivae normal.      Pupils: Pupils are equal, round, and reactive to light.   Cardiovascular:      Rate and Rhythm: Normal rate and regular rhythm.      Pulses: Normal pulses.      Heart sounds: Normal heart sounds. No murmur heard.     No friction rub. No gallop.   Pulmonary:      Effort: Pulmonary effort is normal.      Breath sounds: Normal breath sounds. No wheezing or rhonchi.   Genitourinary:     Vagina: Vaginal discharge present.      Cervix: Discharge present.   Musculoskeletal:      Cervical back: " Bony tenderness present. Pain with movement present.      Thoracic back: Bony tenderness present.      Lumbar back: Tenderness and bony tenderness present. Positive right straight leg raise test and positive left straight leg raise test.   Skin:     General: Skin is warm and dry.   Neurological:      Mental Status: She is alert and oriented to person, place, and time.      Cranial Nerves: No cranial nerve deficit.   Psychiatric:         Mood and Affect: Mood and affect normal.         Behavior: Behavior normal.         Thought Content: Thought content normal.         Judgment: Judgment normal.          Result Review        No Images in the past 120 days found..      The above data has been reviewed by MICHEAL Renae 02/05/2025 12:24 EST.          Patient Care Team:  Sandi Gerardo APRN as PCP - General (Family Medicine)  Felicita Edward APRN (Nurse Practitioner)  Bailee Edgar MD as Consulting Physician (Urology)  Makenzie Sigala APRN as Nurse Practitioner (Nurse Practitioner)            ASSESSMENT & PLAN    Diagnoses and all orders for this visit:    1. Vaginal discharge (Primary)  Comments:  Will check vaginal swab  Orders:  -     Gardnerella vaginalis, Trichomonas vaginalis, Candida albicans, DNA - Swab, Vagina; Future    2. Chronic midline thoracic back pain  Comments:  Will check x-rays of thoracic spine, continue ibuprofen as needed.  Orders:  -     XR Spine Thoracic 3 View; Future    3. Cervical pain (neck)  Comments:  Will check x-rays of cervical spine, continue ibuprofen as needed  Orders:  -     XR Spine Cervical Complete 4 or 5 View; Future    4. Lumbar back pain  Comments:  Will check x-rays of lumbar spine, continue ibuprofen as needed  Orders:  -     XR Spine Lumbar Complete 4+VW; Future    5. Gastroesophageal reflux disease without esophagitis  Comments:  Symptoms well controlled with current medication regimen, cont. Current meds.         Tobacco Use: Medium Risk (2/5/2025)     Patient History     Smoking Tobacco Use: Never     Smokeless Tobacco Use: Never     Passive Exposure: Current       Follow Up     Return if symptoms worsen or fail to improve.      Patient was given instructions and counseling regarding her condition or for health maintenance advice. Please see specific information pulled into the AVS if appropriate.   I have reviewed information obtained and documented by others and I have confirmed the accuracy of this documented note.    Sandi Gerardo, APRN

## 2025-02-06 ENCOUNTER — TELEPHONE (OUTPATIENT)
Dept: FAMILY MEDICINE CLINIC | Facility: CLINIC | Age: 30
End: 2025-02-06
Payer: COMMERCIAL

## 2025-02-10 DIAGNOSIS — M54.6 THORACIC SPINE PAIN: ICD-10-CM

## 2025-02-10 DIAGNOSIS — M54.2 CERVICAL SPINE PAIN: Primary | ICD-10-CM

## 2025-02-10 DIAGNOSIS — M54.50 LUMBAR BACK PAIN: ICD-10-CM

## 2025-02-14 ENCOUNTER — TELEPHONE (OUTPATIENT)
Dept: FAMILY MEDICINE CLINIC | Facility: CLINIC | Age: 30
End: 2025-02-14
Payer: COMMERCIAL

## 2025-02-14 NOTE — TELEPHONE ENCOUNTER
Caller: Kinsey Suresh    Relationship: Self    Best call back number: [phone]     What form or medical record are you requesting: ACCOMODATION LETTER     Who is requesting this form or medical record from you: EMPLOYER    How would you like to receive the form or medical records (pick-up, mail, fax):    CALL WHEN READY     Timeframe paperwork needed: AS SOON AS POSSIBLE    Additional notes: PATIENT STATES SHE NEEDS A LETTER FOR ACCOMODATION FOR HER EMPLOYER DUE TO HER CONDITIONS.    PATIENT NEEDS THE LETTER TO STATE SHE NEEDS A SIT TO STAND DESK SO SHE CAN STAND AND WORK IF NEEDED. PATIENT AN ERGONOMIC CHAIR TO SUPPORT HER BACK.     PATIENT WOULD LIKE  A CALL WHEN READY.

## 2025-02-17 NOTE — TELEPHONE ENCOUNTER
I have written letter and sent it to patient via [employer].  I can print and mail her a copy if she needs that.

## 2025-02-17 NOTE — TELEPHONE ENCOUNTER
OK to write letter for sit to stand desk and chair for patient?   Patient had cervical, thoracic and lumbar xrays 2/5/25 showing mild arthitis in thoracolumbar junction, otherwise normal, recommendation was for PT.

## 2025-03-05 ENCOUNTER — TREATMENT (OUTPATIENT)
Dept: PHYSICAL THERAPY | Facility: CLINIC | Age: 30
End: 2025-03-05
Payer: COMMERCIAL

## 2025-03-05 DIAGNOSIS — G89.29 CHRONIC BILATERAL LOW BACK PAIN WITH RIGHT-SIDED SCIATICA: ICD-10-CM

## 2025-03-05 DIAGNOSIS — M54.2 CERVICAL PAIN: Primary | ICD-10-CM

## 2025-03-05 DIAGNOSIS — M54.41 CHRONIC BILATERAL LOW BACK PAIN WITH RIGHT-SIDED SCIATICA: ICD-10-CM

## 2025-03-05 NOTE — PROGRESS NOTES
Physical Therapy Initial Evaluation and Plan of Care                       Patient: Kinsey Suresh   : 1995  Diagnosis/ICD-10 Code:  Cervical pain [M54.2]  Referring practitioner: MICHEAL Renae  Date of Initial Visit: 3/5/2025  Today's Date: 3/5/2025  Patient seen for 1 sessions           Subjective Questionnaire: NDI:25      Subjective Evaluation    History of Present Illness  Mechanism of injury: Pt has a history of mid to lower back pain which has been present since . Her pain began without known cause and she had approximately 2 sessions of PT about 3 years ago. She states bending forward and static sitting and standing increase her pain as well on the right side, but will be more midline if she has to pick something up. Her pain will ease if she lies down. Her lower back is more painful than the thoracic area. She does have pain descending down the R LE that occurs 1x/month. The neck pain has been going on for the past 2 years. Neck pain worsens when she looks down at her phone or computer at work, or when she looks up, as well as when she first wakes up in the morning. Ibuprofen seem to relieve the neck pain. She reports frequent headaches.     Pain  Quality: sharp, discomfort and tight  Relieving factors: change in position  Aggravating factors: movement, prolonged positioning, squatting, repetitive movement, sleeping, standing, ambulation and lifting  Progression: no change    Treatments  Previous treatment: physical therapy  Patient Goals  Patient goals for therapy: decreased pain, increased motion, increased strength, independence with ADLs/IADLs and return to sport/leisure activities             Objective          Active Range of Motion   Cervical/Thoracic Spine   Cervical    Flexion: 50 degrees with pain  Extension: 54 degrees with pain  Left lateral flexion: 34 degrees   Right lateral flexion: 38 degrees   Left rotation: 70 degrees   Right rotation: 61 degrees     Lumbar    Extension: with pain  Left lateral flexion: with pain    Additional Active Range of Motion Details  Lumbar AROM  Flexion: 75%  Extension: 50%  Right Side Bendin%  Left Side Bendin%  Right Rotation:100%  Left Rotation: 100%          Passive Range of Motion   Left Hip   Normal passive range of motion    Right Hip   Normal passive range of motion    Additional Passive Range of Motion Details  Reproduced low back pain with flexion and IR on right    Strength/Myotome Testing   Cervical Spine     Left   Normal strength    Right   Normal strength    Left Hip   Planes of Motion   Flexion: 4  Abduction: 5  Adduction: 5    Right Hip   Planes of Motion   Flexion: 5  Abduction: 5  Adduction: 5    Left Knee   Flexion: 4  Extension: 4    Right Knee   Flexion: 5  Extension: 5    Tests       Thoracic   Negative slump.      General Comments     Lumbar Comments  Hypomobile and painful throughout lumbar spine to PA    Cervical/Thoracic Comments  Tender and hypomobile throughout cspine bilaterally       See Exercise, Manual, and Modality Logs for complete treatment.     Assessment/Plan    Visit Diagnoses:    ICD-10-CM ICD-9-CM   1. Cervical pain  M54.2 723.1   2. Chronic bilateral low back pain with right-sided sciatica  M54.41 724.2    G89.29 724.3     338.29       History # of Personal Factors and/or Comorbidities: LOW (0)  Examination of Body System(s): # of elements: LOW (1-2)  Clinical Presentation: STABLE   Clinical Decision Making: LOW       Timed:         Manual Therapy:    0     mins  22455;     Therapeutic Exercise:    20     mins  16576;     Neuromuscular Marylou:    0    mins  50192;    Therapeutic Activity:     10     mins  12218;     Gait Trainin     mins  59498;     Ultrasound:     0     mins  69300;    Ionto                               0    mins   65567  Self Care                       10     mins   32346  Canalith Repos    0     mins 38209      Un-Timed:  Electrical Stimulation:    0     mins  70316  ( );  Dry Needling     0     mins self-pay  Traction     0     mins 25636  Low Eval     20     Mins  65553  Mod Eval     0     Mins  61482  High Eval                       0     Mins  18750  Re-Eval                           0    mins  01220    Timed Treatment:   40   mins   Total Treatment:     60   mins    PT SIGNATURE: Pillo Morrissey PT    Electronically signed 3/5/2025    [place] License: PT - [id number]      Initial Certification  Certification Period: 3/5/2025 thru 6/2/2025  I certify that the therapy services are furnished while this patient is under my care.  The services outlined above are required by this patient, and will be reviewed every 90 days.     PHYSICIAN: Sandi Gerardo APRN  NPI: [id number]      DATE:     Please sign and return via fax to [phone]. Thank you, [facility].

## 2025-03-07 ENCOUNTER — TREATMENT (OUTPATIENT)
Dept: PHYSICAL THERAPY | Facility: CLINIC | Age: 30
End: 2025-03-07
Payer: COMMERCIAL

## 2025-03-07 DIAGNOSIS — G89.29 CHRONIC BILATERAL LOW BACK PAIN WITH RIGHT-SIDED SCIATICA: ICD-10-CM

## 2025-03-07 DIAGNOSIS — M54.2 CERVICAL PAIN: Primary | ICD-10-CM

## 2025-03-07 DIAGNOSIS — M54.41 CHRONIC BILATERAL LOW BACK PAIN WITH RIGHT-SIDED SCIATICA: ICD-10-CM

## 2025-03-07 NOTE — PROGRESS NOTES
Outpatient Physical Therapy                   Physical Therapy Daily Treatment Note    Patient: Kinsey Suresh   : 1995  Diagnosis/ICD-10 Code:  Cervical pain [M54.2]  Referring practitioner: MICHEAL Renae  Date of Initial Visit: Type: THERAPY  Noted: 3/5/2025  Today's Date: 3/7/2025  Patient seen for 2 sessions             Subjective   Kinsey Suresh reports: compliance with HEP.     Pain: 6/10 pain, in her neck and back. Pt states her neck may be slightly worse.     Objective     See Exercise, Manual, and Modality Logs for complete treatment.     Assessment/Plan  Kinsey is just beginning care to attend to deficits outlined in evaluation, requiring further therapist directed strengthening. Assess how patient tolerated treatment next session.    Progress per Plan of Care      Timed:  Manual Therapy:    0     mins  41695;  Therapeutic Exercise:    16     mins  00000;     Neuromuscular Marylou:    0    mins  96788;    Therapeutic Activity:     10     mins  95611;     Self care:                       0     mins  49144;  Gait Trainin     mins  66230;       Ultrasound:                    0     mins  48615;      Untimed:  Mechanical Traction:    0     mins  42072;   Electrical Stimulation:    0     mins  19770 ( );      Timed Treatment:   26   mins   Total Treatment:     26   mins      Electronically signed:   Tiarra Krishnamurthy PTA  Physical Therapist Assistant  [facility] License #: [id number]

## 2025-03-10 ENCOUNTER — TREATMENT (OUTPATIENT)
Dept: PHYSICAL THERAPY | Facility: CLINIC | Age: 30
End: 2025-03-10
Payer: COMMERCIAL

## 2025-03-10 DIAGNOSIS — M54.41 CHRONIC BILATERAL LOW BACK PAIN WITH RIGHT-SIDED SCIATICA: ICD-10-CM

## 2025-03-10 DIAGNOSIS — M54.2 CERVICAL PAIN: Primary | ICD-10-CM

## 2025-03-10 DIAGNOSIS — G89.29 CHRONIC BILATERAL LOW BACK PAIN WITH RIGHT-SIDED SCIATICA: ICD-10-CM

## 2025-03-10 NOTE — PROGRESS NOTES
"   Physical Therapy Daily Treatment Note                          Patient: Kinsey Suresh   : 1995  Diagnosis/ICD-10 Code:  Cervical pain [M54.2]  Referring practitioner: MICHEAL Renae  Date of Initial Visit: Type: THERAPY  Noted: 3/5/2025  Today's Date: 3/10/2025  Patient seen for 3 sessions           Subjective   The patient reported doing \"okay\" this morning and reports some soreness    Objective   See Exercise, Manual, and Modality Logs for complete treatment.     Assessment/Plan     Patient tolerated today's treatment without complaints of pain. Strengthening and stabilization training performed without adverse affects. Cues required for abdominal bracing during pallof press. Strength, ROM, and increased pain with activity deficits still limits patient's ability to perform ADLs and work tasks. Further skilled care indicated at this time.     Timed:  Manual Therapy:    10     mins  11701;  Therapeutic Exercise:    10     mins  40501;     Neuromuscular Marylou:   10    mins  35061;    Therapeutic Activity:     0     mins  47420;     Gait Trainin     mins  16906;     Aquatics                         0      mins  53321    Un-timed:  Mechanical Traction      0     mins  98491  Dry Needling     0     mins self-pay  Electrical Stimulation:    0     mins  13601 ( );      Timed Treatment:   30   mins   Total Treatment:     30   mins    Pillo Morrissey PT  Physical Therapist    Electronically signed 3/10/2025    [place] License: PT - [id number]                      "

## 2025-03-19 ENCOUNTER — APPOINTMENT (OUTPATIENT)
Dept: GENERAL RADIOLOGY | Facility: HOSPITAL | Age: 30
End: 2025-03-19
Payer: COMMERCIAL

## 2025-03-19 ENCOUNTER — HOSPITAL ENCOUNTER (EMERGENCY)
Facility: HOSPITAL | Age: 30
Discharge: HOME | End: 2025-03-19
Attending: EMERGENCY MEDICINE | Admitting: EMERGENCY MEDICINE
Payer: COMMERCIAL

## 2025-03-19 VITALS
WEIGHT: 231 LBS | HEIGHT: 64 IN | SYSTOLIC BLOOD PRESSURE: 124 MMHG | TEMPERATURE: 98.3 F | RESPIRATION RATE: 18 BRPM | OXYGEN SATURATION: 96 % | HEART RATE: 65 BPM | BODY MASS INDEX: 39.44 KG/M2 | DIASTOLIC BLOOD PRESSURE: 72 MMHG

## 2025-03-19 DIAGNOSIS — R07.89 CHEST WALL PAIN: ICD-10-CM

## 2025-03-19 DIAGNOSIS — J06.9 VIRAL URI WITH COUGH: Primary | ICD-10-CM

## 2025-03-19 LAB
ALBUMIN SERPL-MCNC: 3.9 G/DL (ref 3.5–5.2)
ALBUMIN/GLOB SERPL: 1.3 G/DL
ALP SERPL-CCNC: 101 U/L (ref 39–117)
ALT SERPL W P-5'-P-CCNC: 15 U/L (ref 1–33)
ANION GAP SERPL CALCULATED.3IONS-SCNC: 9.9 MMOL/L (ref 5–15)
AST SERPL-CCNC: 19 U/L (ref 1–32)
BASOPHILS # BLD AUTO: 0.16 10*3/MM3 (ref 0–0.2)
BASOPHILS NFR BLD AUTO: 1.3 % (ref 0–1.5)
BILIRUB SERPL-MCNC: <0.2 MG/DL (ref 0–1.2)
BUN SERPL-MCNC: 11 MG/DL (ref 6–20)
BUN/CREAT SERPL: 12.4 (ref 7–25)
CALCIUM SPEC-SCNC: 9.1 MG/DL (ref 8.6–10.5)
CHLORIDE SERPL-SCNC: 105 MMOL/L (ref 98–107)
CO2 SERPL-SCNC: 24.1 MMOL/L (ref 22–29)
CREAT SERPL-MCNC: 0.89 MG/DL (ref 0.57–1)
DEPRECATED RDW RBC AUTO: 39.8 FL (ref 37–54)
EGFRCR SERPLBLD CKD-EPI 2021: 91.3 ML/MIN/1.73
EOSINOPHIL # BLD AUTO: 0.08 10*3/MM3 (ref 0–0.4)
EOSINOPHIL NFR BLD AUTO: 0.7 % (ref 0.3–6.2)
ERYTHROCYTE [DISTWIDTH] IN BLOOD BY AUTOMATED COUNT: 12.2 % (ref 12.3–15.4)
GLOBULIN UR ELPH-MCNC: 2.9 GM/DL
GLUCOSE SERPL-MCNC: 118 MG/DL (ref 65–99)
HCT VFR BLD AUTO: 43.3 % (ref 34–46.6)
HGB BLD-MCNC: 14 G/DL (ref 12–15.9)
HOLD SPECIMEN: NORMAL
HOLD SPECIMEN: NORMAL
IMM GRANULOCYTES # BLD AUTO: 0.08 10*3/MM3 (ref 0–0.05)
IMM GRANULOCYTES NFR BLD AUTO: 0.7 % (ref 0–0.5)
LIPASE SERPL-CCNC: 40 U/L (ref 13–60)
LYMPHOCYTES # BLD AUTO: 4.34 10*3/MM3 (ref 0.7–3.1)
LYMPHOCYTES NFR BLD AUTO: 35.5 % (ref 19.6–45.3)
MAGNESIUM SERPL-MCNC: 2.2 MG/DL (ref 1.6–2.6)
MCH RBC QN AUTO: 28.9 PG (ref 26.6–33)
MCHC RBC AUTO-ENTMCNC: 32.3 G/DL (ref 31.5–35.7)
MCV RBC AUTO: 89.3 FL (ref 79–97)
MONOCYTES # BLD AUTO: 0.72 10*3/MM3 (ref 0.1–0.9)
MONOCYTES NFR BLD AUTO: 5.9 % (ref 5–12)
NEUTROPHILS NFR BLD AUTO: 55.9 % (ref 42.7–76)
NEUTROPHILS NFR BLD AUTO: 6.83 10*3/MM3 (ref 1.7–7)
NRBC BLD AUTO-RTO: 0 /100 WBC (ref 0–0.2)
NT-PROBNP SERPL-MCNC: 424.3 PG/ML (ref 0–450)
PLATELET # BLD AUTO: 252 10*3/MM3 (ref 140–450)
PMV BLD AUTO: 10.2 FL (ref 6–12)
POTASSIUM SERPL-SCNC: 4.1 MMOL/L (ref 3.5–5.2)
PROT SERPL-MCNC: 6.8 G/DL (ref 6–8.5)
RBC # BLD AUTO: 4.85 10*6/MM3 (ref 3.77–5.28)
SODIUM SERPL-SCNC: 139 MMOL/L (ref 136–145)
TROPONIN T SERPL HS-MCNC: <6 NG/L
WBC NRBC COR # BLD AUTO: 12.21 10*3/MM3 (ref 3.4–10.8)
WHOLE BLOOD HOLD COAG: NORMAL
WHOLE BLOOD HOLD SPECIMEN: NORMAL

## 2025-03-19 PROCEDURE — 93005 ELECTROCARDIOGRAM TRACING: CPT | Performed by: EMERGENCY MEDICINE

## 2025-03-19 PROCEDURE — 83735 ASSAY OF MAGNESIUM: CPT | Performed by: EMERGENCY MEDICINE

## 2025-03-19 PROCEDURE — 99284 EMERGENCY DEPT VISIT MOD MDM: CPT

## 2025-03-19 PROCEDURE — 85025 COMPLETE CBC W/AUTO DIFF WBC: CPT

## 2025-03-19 PROCEDURE — 83690 ASSAY OF LIPASE: CPT | Performed by: EMERGENCY MEDICINE

## 2025-03-19 PROCEDURE — 80053 COMPREHEN METABOLIC PANEL: CPT | Performed by: EMERGENCY MEDICINE

## 2025-03-19 PROCEDURE — 84484 ASSAY OF TROPONIN QUANT: CPT | Performed by: EMERGENCY MEDICINE

## 2025-03-19 PROCEDURE — 93010 ELECTROCARDIOGRAM REPORT: CPT | Performed by: SPECIALIST

## 2025-03-19 PROCEDURE — 71045 X-RAY EXAM CHEST 1 VIEW: CPT

## 2025-03-19 PROCEDURE — 83880 ASSAY OF NATRIURETIC PEPTIDE: CPT | Performed by: EMERGENCY MEDICINE

## 2025-03-19 PROCEDURE — 93005 ELECTROCARDIOGRAM TRACING: CPT

## 2025-03-19 RX ORDER — ASPIRIN 81 MG/1
324 TABLET, CHEWABLE ORAL ONCE
Status: DISCONTINUED | OUTPATIENT
Start: 2025-03-19 | End: 2025-03-19

## 2025-03-19 RX ORDER — SODIUM CHLORIDE 0.9 % (FLUSH) 0.9 %
10 SYRINGE (ML) INJECTION AS NEEDED
Status: DISCONTINUED | OUTPATIENT
Start: 2025-03-19 | End: 2025-03-19 | Stop reason: HOSPADM

## 2025-03-19 NOTE — DISCHARGE INSTRUCTIONS
Return to the emergency department immediately for worsening chest pain or difficulty breathing.  Your chest x-ray today is normal along with your cardiac workup.  The fact that your pain is worse with certain movements and to touch is reassuring that this is more likely chest wall pain.  Stay well-hydrated.

## 2025-03-19 NOTE — Clinical Note
[facility] EMERGENCY ROOM  [address] [place] [address]  Phone: [phone]  Fax: [phone]    Kinsey Suresh was seen and treated in our emergency department on 3/19/2025.  She may return to work on 03/20/2025.         Thank you for choosing [facility].    Ashok Etienne MD

## 2025-03-19 NOTE — Clinical Note
[facility] EMERGENCY ROOM  [address] [place] [address]  Phone: [phone]  Fax: [phone]    Kinsey Suresh was seen and treated in our emergency department on 3/19/2025.  She may return to work on 03/21/2025.         Thank you for choosing [facility].    Ashok Etienne MD

## 2025-03-19 NOTE — ED PROVIDER NOTES
Time: 4:42 PM EDT  Date of encounter:  3/19/2025  Independent Historian/Clinical History and Information was obtained by:   Patient    History is limited by: N/A    Chief Complaint: Cough, chest pain      History of Present Illness:  Patient is a 29 y.o. year old female who presents to the emergency department for evaluation of cough, congestion and chest pain.  Patient states she was recently at urgent care and they put her on antibiotics, steroids, cough medications.  She presents today because her sternal region and left side of her chest hurt when she coughs, with movement and to touch.  Afebrile.      Patient Care Team  Primary Care Provider: Sandi Gerardo APRN    Past Medical History:     Allergies   Allergen Reactions    Ciprofloxacin Rash    Doxycycline Hyclate Rash    Doxycycline Monohydrate Rash    Latex Rash    Sulfa Antibiotics Rash    Sulfamethoxazole-Trimethoprim Hives and Rash    Trimethoprim Rash     Past Medical History:   Diagnosis Date    Allergic 2012    Anxiety     Asthma     PRN INHALERS    Borderline diabetic     NO MEDICATIONS AS OF YET TRYING CHANGE OF DIET- DOESN'T CHECK BG AT HOME    Clostridium difficile diarrhea 01/30/2022    DENIES ANY CURRENT ISSUES    Colon polyp 2020    Depression     GERD (gastroesophageal reflux disease)     HL (hearing loss)     Hypertension     Irritable bowel syndrome     Kidney stones     Migraine without aura     Obesity     Ovarian cyst     right ovary     PONV (postoperative nausea and vomiting)     PVC (premature ventricular contraction)     FOLLOWED BY DR FIGUEROA HAS SCHEDULED ECHO ON 7/3/23 . REPORTS ISSUES WITH UPPER LEFT CHEST PAIN BUT STATES IS SORE WITH PALPATION ALSO REPORTS SOA WITH EXERTION. ACTIVE WORKS FULL TIME    Right ventricular cardiac abnormality     DILATATION PER DR FIGUEROA NOTE - DENIED CP/SOB    Seasonal allergies     Sleep apnea     DOES NOT USE CPAP    Urinary tract infection     NO CURRENT ISSUES     Past Surgical History:    Procedure Laterality Date    BILATERAL BREAST REDUCTION Bilateral 07/11/2023    Procedure: BREAST REDUCTION BILATERAL EXCISION OF LEFT UPPER CHEST MOLE;  Surgeon: Mariah Lieberman MD;  Location: [facility] OR [facility];  Service: Plastics;  Laterality: Bilateral;    BREAST SURGERY  2023    COLONOSCOPY  2020    COLONOSCOPY N/A 07/11/2022    Procedure: COLONOSCOPY BIOPSY;  Surgeon: David Yip MD;  Location: [facility] ENDOSCOPY;  Service: Gastroenterology;  Laterality: N/A;  NORMAL COLON    ENDOSCOPY      EXCISION LESION Left 07/11/2023    Procedure: BREAST REDUCTION BILATERAL EXCISION OF LEFT UPPER CHEST MOLE;  Surgeon: Mariah Lieberman MD;  Location: [facility] OR [facility];  Service: Plastics;  Laterality: Left;    KIDNEY STONE SURGERY  11/2021    URETEROSCOPY LASER LITHOTRIPSY WITH STENT INSERTION Left 11/13/2021    Procedure: URETEROSCOPY LASER LITHOTRIPSY WITH STENT INSERTION STONE BASKET EXTRACTION;  Surgeon: Colt Sanders MD;  Location: [facility] MAIN OR;  Service: Urology;  Laterality: Left;    URETEROSCOPY LASER LITHOTRIPSY WITH STENT INSERTION Right 08/18/2023    Procedure: CYSTOSCOPY URETEROSCOPY RETROGRADE PYELOGRAM HOLMIUM LASER STENT INSERTION, right;  Surgeon: Bailee Edgar MD;  Location: [facility] MAIN OR;  Service: Urology;  Laterality: Right;     Family History   Problem Relation Age of Onset    Heart failure Mother     Early death Mother     Mental illness Mother     Asthma Sister     Depression Sister     Vision loss Sister     Cancer Maternal Aunt     Skin cancer Maternal Grandmother     Anxiety disorder Maternal Grandmother     Hypertension Maternal Grandmother     Vision loss Maternal Grandmother     Colon cancer Maternal Grandfather     Heart failure Maternal Grandfather     Diabetes Maternal Grandfather     Heart disease Maternal Grandfather     Hyperlipidemia Maternal Grandfather     Colon cancer Paternal Grandfather     Heart disease Maternal Great-Grandfather     Breast cancer  Other 60    Alcohol abuse Maternal Uncle     Drug abuse Maternal Uncle     Ovarian cancer Neg Hx     Uterine cancer Neg Hx     Prostate cancer Neg Hx        Home Medications:  Prior to Admission medications    Medication Sig Start Date End Date Taking? Authorizing Provider   albuterol sulfate  (90 Base) MCG/ACT inhaler Inhale 2 puffs Every 4 (Four) Hours As Needed for Wheezing or Shortness of Air. 2/17/25   Valentín Jackson APRN   azithromycin (Zithromax Z-Nikhil) 250 MG tablet Take 2 tablets by mouth on day 1, then 1 tablet daily on days 2-5 3/17/25   Valentín Jackson APRN   benzonatate (TESSALON) 100 MG capsule Take 1 capsule by mouth 3 (Three) Times a Day As Needed for Cough. 3/17/25   Valentín Jackson APRN   bisoprolol (ZEBeta) 5 MG tablet TAKE 1/2 TABLET BY MOUTH DAILY 12/20/24   Sandi Gerardo APRN   cetirizine (zyrTEC) 10 MG tablet Take 1 tablet by mouth Daily. 10/25/24   Charlotte Lal MD   dicyclomine (BENTYL) 10 MG capsule Take 2 capsules by mouth 3 (Three) Times a Day As Needed for Abdominal Cramping. 5/14/24   Makenzie Sigala APRN   famotidine (PEPCID) 20 MG tablet TAKE 1 TABLET BY MOUTH TWICE A DAY 10/11/24   Andrea Rollins MD   fluticasone (FLONASE) 50 MCG/ACT nasal spray Administer 2 sprays into the nostril(s) as directed by provider Daily. 2/17/25   Valentín Jackson APRN   folic acid (FOLVITE) 1 MG tablet Take 1 tablet by mouth Daily. 12/31/24   Sandi Gerardo APRN   methylPREDNISolone (MEDROL) 4 MG dose pack Take as directed on package instructions. 3/17/25   Valentín Jackson APRN   norethindrone (MICRONOR) 0.35 MG tablet  12/12/24   ProviderEric MD   omeprazole (priLOSEC) 40 MG capsule Take 1 capsule by mouth Daily. 10/16/24   Sandi Gerardo APRN   ondansetron ODT (ZOFRAN-ODT) 4 MG disintegrating tablet Place 1 tablet on the tongue Every 6 (Six) Hours As Needed for Vomiting. 4/22/24   Andrea Rollins MD  "  promethazine-dextromethorphan (PROMETHAZINE-DM) 6.25-15 MG/5ML syrup Take 5 mL by mouth 4 (Four) Times a Day As Needed for Cough. 3/17/25   Valentín Jackson APRN   venlafaxine (EFFEXOR) 75 MG tablet Take 1 tablet by mouth Daily. 12/31/24   Sandi Gerardo APRN        Social History:   Social History     Tobacco Use    Smoking status: Never     Passive exposure: Current    Smokeless tobacco: Never    Tobacco comments:     secondhand smoke exposure   Vaping Use    Vaping status: Never Used    Passive vaping exposure: Yes   Substance Use Topics    Alcohol use: Not Currently    Drug use: Never         Review of Systems:  Review of Systems   Constitutional:  Negative for chills and fever.   HENT:  Positive for congestion. Negative for rhinorrhea and sore throat.    Eyes:  Negative for photophobia.   Respiratory:  Positive for cough. Negative for apnea, chest tightness and shortness of breath.    Cardiovascular:  Positive for chest pain. Negative for palpitations.   Gastrointestinal:  Negative for abdominal pain, diarrhea, nausea and vomiting.   Endocrine: Negative.    Genitourinary:  Negative for difficulty urinating and dysuria.   Musculoskeletal:  Negative for back pain, joint swelling and myalgias.   Skin:  Negative for color change and wound.   Allergic/Immunologic: Negative.    Neurological:  Negative for seizures and headaches.   Psychiatric/Behavioral: Negative.     All other systems reviewed and are negative.       Physical Exam:  /72   Pulse 65   Temp 98.3 °F (36.8 °C) (Oral)   Resp 18   Ht 162.6 cm (64\")   Wt 105 kg (231 lb)   LMP 03/06/2025 (Exact Date)   SpO2 96%   BMI 39.65 kg/m²     Physical Exam  Vitals and nursing note reviewed.   Constitutional:       General: She is awake.      Appearance: Normal appearance. She is well-developed.   HENT:      Head: Normocephalic and atraumatic.      Nose: Nose normal.      Mouth/Throat:      Mouth: Mucous membranes are moist.   Eyes:      " Extraocular Movements: Extraocular movements intact.      Pupils: Pupils are equal, round, and reactive to light.   Cardiovascular:      Rate and Rhythm: Normal rate and regular rhythm.      Heart sounds: Normal heart sounds.   Pulmonary:      Effort: Pulmonary effort is normal. No respiratory distress.      Breath sounds: Normal breath sounds. No wheezing, rhonchi or rales.   Chest:      Chest wall: Tenderness (Left chest wall soft tissue reproducible pain when the patient palpates her own chest) present.   Abdominal:      General: Bowel sounds are normal.      Palpations: Abdomen is soft.      Tenderness: There is no abdominal tenderness. There is no guarding or rebound.      Comments: No rigidity   Musculoskeletal:         General: No tenderness. Normal range of motion.      Cervical back: Normal range of motion and neck supple.   Skin:     General: Skin is warm and dry.      Coloration: Skin is not jaundiced.   Neurological:      General: No focal deficit present.      Mental Status: She is alert. Mental status is at baseline.   Psychiatric:         Mood and Affect: Mood normal.                    Medical Decision Making:      Comorbidities that affect care:    GERD, asthma, anxiety, migraines, hypertension, obesity    External Notes reviewed:    Previous Clinic Note: Urgent care visit 3/17/2025.  Diagnosis: Lower respiratory tract infection .  Patient was placed on azithromycin, benzonatate, Promethazine DM      The following orders were placed and all results were independently analyzed by me:  Orders Placed This Encounter   Procedures    XR Chest 1 View    [place] Draw    High Sensitivity Troponin T    Comprehensive Metabolic Panel    Lipase    BNP    Magnesium    CBC Auto Differential    Undress & Gown    Continuous Pulse Oximetry    ECG 12 Lead ED Triage Standing Order; Chest Pain    CBC & Differential    Green Top (Gel)    Lavender Top    Gold Top - SST    Light Blue Top       Medications Given in the  Emergency Department:  Medications - No data to display       ED Course:    ED Course as of 03/19/25 1920   Wed Mar 19, 2025   1613 I have personally interpreted the EKG today and it shows no evidence of any acute ischemia or heart arrhythmia. [RP]      ED Course User Index  [RP] Ashok Etienne MD       Labs:    Lab Results (last 24 hours)       Procedure Component Value Units Date/Time    High Sensitivity Troponin T [[id number]]  (Normal) Collected: 03/19/25 1407    Specimen: Blood Updated: 03/19/25 1443     HS Troponin T <6 ng/L     Narrative:      High Sensitive Troponin T Reference Range:  <14.0 ng/L- Negative Female for AMI  <22.0 ng/L- Negative Male for AMI  >=14 - Abnormal Female indicating possible myocardial injury.  >=22 - Abnormal Male indicating possible myocardial injury.   Clinicians would have to utilize clinical acumen, EKG, Troponin, and serial changes to determine if it is an Acute Myocardial Infarction or myocardial injury due to an underlying chronic condition.         CBC & Differential [[id number]]  (Abnormal) Collected: 03/19/25 1407    Specimen: Blood Updated: 03/19/25 1414    Narrative:      The following orders were created for panel order CBC & Differential.  Procedure                               Abnormality         Status                     ---------                               -----------         ------                     CBC Auto Differential[[id number]]        Abnormal            Final result                 Please view results for these tests on the individual orders.    Comprehensive Metabolic Panel [[id number]]  (Abnormal) Collected: 03/19/25 1407    Specimen: Blood Updated: 03/19/25 1454     Glucose 118 mg/dL      BUN 11 mg/dL      Creatinine 0.89 mg/dL      Sodium 139 mmol/L      Potassium 4.1 mmol/L      Comment: Specimen hemolyzed.  Result may be falsely elevated.        Chloride 105 mmol/L      CO2 24.1 mmol/L      Calcium 9.1 mg/dL      Total Protein 6.8 g/dL       Albumin 3.9 g/dL      ALT (SGPT) 15 U/L      Comment: Specimen hemolyzed.  Result may  be falsely elevated.        AST (SGOT) 19 U/L      Comment: Specimen hemolyzed.  Result may be falsely elevated.        Alkaline Phosphatase 101 U/L      Total Bilirubin <0.2 mg/dL      Globulin 2.9 gm/dL      A/G Ratio 1.3 g/dL      BUN/Creatinine Ratio 12.4     Anion Gap 9.9 mmol/L      eGFR 91.3 mL/min/1.73     Narrative:      GFR Categories in Chronic Kidney Disease (CKD)      GFR Category          GFR (mL/min/1.73)    Interpretation  G1                     90 or greater         Normal or high (1)  G2                      60-89                Mild decrease (1)  G3a                   45-59                Mild to moderate decrease  G3b                   30-44                Moderate to severe decrease  G4                    15-29                Severe decrease  G5                    14 or less           Kidney failure          (1)In the absence of evidence of kidney disease, neither GFR category G1 or G2 fulfill the criteria for CKD.    eGFR calculation 2021 CKD-EPI creatinine equation, which does not include race as a factor    Lipase [[id number]]  (Normal) Collected: 03/19/25 1407    Specimen: Blood Updated: 03/19/25 1443     Lipase 40 U/L     BNP [[id number]]  (Normal) Collected: 03/19/25 1407    Specimen: Blood Updated: 03/19/25 1438     proBNP 424.3 pg/mL     Narrative:      This assay is used as an aid in the diagnosis of individuals suspected of having heart failure. It can be used as an aid in the diagnosis of acute decompensated heart failure (ADHF) in patients presenting with signs and symptoms of ADHF to the emergency department (ED). In addition, NT-proBNP of <300 pg/mL indicates ADHF is not likely.    Age Range Result Interpretation  NT-proBNP Concentration (pg/mL:      <50             Positive            >450                   Gray                 300-450                    Negative             <300    50-75            Positive            >900                  Gray                300-900                  Negative            <300      >75             Positive            >1800                  Gray                300-1800                  Negative            <300    Magnesium [[id number]]  (Normal) Collected: 03/19/25 1407    Specimen: Blood Updated: 03/19/25 1454     Magnesium 2.2 mg/dL     CBC Auto Differential [[id number]]  (Abnormal) Collected: 03/19/25 1407    Specimen: Blood Updated: 03/19/25 1414     WBC 12.21 10*3/mm3      RBC 4.85 10*6/mm3      Hemoglobin 14.0 g/dL      Hematocrit 43.3 %      MCV 89.3 fL      MCH 28.9 pg      MCHC 32.3 g/dL      RDW 12.2 %      RDW-SD 39.8 fl      MPV 10.2 fL      Platelets 252 10*3/mm3      Neutrophil % 55.9 %      Lymphocyte % 35.5 %      Monocyte % 5.9 %      Eosinophil % 0.7 %      Basophil % 1.3 %      Immature Grans % 0.7 %      Neutrophils, Absolute 6.83 10*3/mm3      Lymphocytes, Absolute 4.34 10*3/mm3      Monocytes, Absolute 0.72 10*3/mm3      Eosinophils, Absolute 0.08 10*3/mm3      Basophils, Absolute 0.16 10*3/mm3      Immature Grans, Absolute 0.08 10*3/mm3      nRBC 0.0 /100 WBC              Imaging:    XR Chest 1 View  Result Date: 3/19/2025  XR CHEST 1 VW Date of Exam: 3/19/2025 2:01 PM EDT Indication: Chest Pain Triage Protocol Comparison: 3/17/2025 and prior Findings: Study limited by overlying support and monitoring apparatus. Lung volumes are low. Heart size is within normal limits for technique. No focal consolidation noted. Osseous structures are unremarkable     Impression: Negative Limited low lung volume portable chest Electronically Signed: Glenn Canales MD  3/19/2025 2:09 PM EDT  Workstation ID: [id number]        Differential Diagnosis and Discussion:    Chest Pain:  Based on the patient's signs and symptoms, I considered aortic dissection, myocardial infaction, pulmonary embolism, cardiac tamponade, pericarditis, pneumothorax, musculoskeletal chest pain and other  differential diagnosis as an etiology of the patient's chest pain.   Cough: Differential diagnosis includes but is not limited to pneumonia, acute bronchitis, upper respiratory infection, ACE inhibitor use, allergic reaction, epiglottitis, seasonal allergies, chemical irritants, exercise-induced asthma, viral syndrome.    PROCEDURES:    Labs were collected in the emergency department and all labs were reviewed and interpreted by me.  X-ray were performed in the emergency department and all X-ray impressions were independently interpreted by me.  An EKG was performed and the EKG was interpreted by me.    ECG 12 Lead ED Triage Standing Order; Chest Pain   Preliminary Result   HEART RATE=56  bpm   RR Jflfstaa=2236  ms   WA Wgmxqtqv=005  ms   P Horizontal Axis=43  deg   P Front Axis=-8  deg   QRSD Interval=85  ms   QT Ynwvhvbg=281  ms   GJbI=697  ms   QRS Axis=15  deg   T Wave Axis=-4  deg   - OTHERWISE NORMAL ECG -   Sinus arrhythmia   When compared with ECG of 02-Apr-2024 22:31:42,   Significant repolarization change   Significant axis, voltage or hypertrophy change   Date and Time of Study:2025-03-19 13:43:29          Procedures    MDM                     Patient Care Considerations:    CT CHEST: I considered ordering a CT scan of the chest, however the patient's chest wall pain is reproducible to touch and with certain movements, pointing away from cardiopulmonary pathology.      Consultants/Shared Management Plan:    None    Social Determinants of Health:    Patient is independent, reliable, and has access to care.       Disposition and Care Coordination:    Discharged: The patient is suitable and stable for discharge with no need for consideration of admission.    I have explained the patient´s condition, diagnoses and treatment plan based on the information available to me at this time. I have answered questions and addressed any concerns. The patient has a good  understanding of the patient´s diagnosis,  condition, and treatment plan as can be expected at this point. The vital signs have been stable. The patient´s condition is stable and appropriate for discharge from the emergency department.      The patient will pursue further outpatient evaluation with the primary care physician or other designated or consulting physician as outlined in the discharge instructions. They are agreeable to this plan of care and follow-up instructions have been explained in detail. The patient has received these instructions in written format and has expressed an understanding of the discharge instructions. The patient is aware that any significant change in condition or worsening of symptoms should prompt an immediate return to this or the closest emergency department or call to 911.    Final diagnoses:   Viral URI with cough   Chest wall pain        ED Disposition       ED Disposition   Discharge    Condition   Stable    Comment   --               This medical record created using voice recognition software.             Ashok Etienne MD  03/19/25 1389

## 2025-03-20 LAB
QT INTERVAL: 393 MS
QTC INTERVAL: 378 MS

## 2025-03-21 ENCOUNTER — TREATMENT (OUTPATIENT)
Dept: PHYSICAL THERAPY | Facility: CLINIC | Age: 30
End: 2025-03-21
Payer: COMMERCIAL

## 2025-03-21 DIAGNOSIS — M54.41 CHRONIC BILATERAL LOW BACK PAIN WITH RIGHT-SIDED SCIATICA: ICD-10-CM

## 2025-03-21 DIAGNOSIS — M54.2 CERVICAL PAIN: Primary | ICD-10-CM

## 2025-03-21 DIAGNOSIS — G89.29 CHRONIC BILATERAL LOW BACK PAIN WITH RIGHT-SIDED SCIATICA: ICD-10-CM

## 2025-03-21 NOTE — PROGRESS NOTES
Outpatient Physical Therapy                   Physical Therapy Daily Treatment Note    Patient: Kinsey Suresh   : 1995  Diagnosis/ICD-10 Code:  Cervical pain [M54.2]  Referring practitioner: MICHEAL Renae  Date of Initial Visit: Type: THERAPY  Noted: 3/5/2025  Today's Date: 3/21/2025  Patient seen for 4 sessions             Subjective   Kinsey Suresh reports: she was sore after she left last session. Pt states that its been a rough week. Pt states her lower back has been the worst; 6/10 pain at its worst in the last week.     Pain: 6/10 pain, at time of arrival throughout her back.     Objective     See Exercise, Manual, and Modality Logs for complete treatment.     Assessment/Plan  Kinsey demonstrated good tolerance to all functional strengthening . Pt was very stiff and tight at the beginning of manual therapy; it did improve some with treatment. Continue to progress with current PT plan of care per patient tolerance.       Progress per Plan of Care      Timed:  Manual Therapy:    13     mins  31197;  Therapeutic Exercise:    11     mins  95052;     Neuromuscular Marylou:    0    mins  47677;    Therapeutic Activity:     9     mins  54648;     Self care:                       0     mins  42571;  Gait Trainin     mins  84492;       Ultrasound:                    0     mins  69821;      Untimed:  Mechanical Traction:    0     mins  46763;   Electrical Stimulation:    0     mins  41100 ( );      Timed Treatment:   33   mins   Total Treatment:     33   mins      Electronically signed:   Tiarra Krishnamurthy PTA  Physical Therapist Assistant  [facility] License #: [id number]

## 2025-03-28 ENCOUNTER — TREATMENT (OUTPATIENT)
Dept: PHYSICAL THERAPY | Facility: CLINIC | Age: 30
End: 2025-03-28
Payer: COMMERCIAL

## 2025-03-28 DIAGNOSIS — M54.2 CERVICAL PAIN: Primary | ICD-10-CM

## 2025-03-28 DIAGNOSIS — M54.41 CHRONIC BILATERAL LOW BACK PAIN WITH RIGHT-SIDED SCIATICA: ICD-10-CM

## 2025-03-28 DIAGNOSIS — G89.29 CHRONIC BILATERAL LOW BACK PAIN WITH RIGHT-SIDED SCIATICA: ICD-10-CM

## 2025-03-28 NOTE — PROGRESS NOTES
"Outpatient Physical Therapy                   Physical Therapy Daily Treatment Note    Patient: Kinsey Suresh   : 1995  Diagnosis/ICD-10 Code:  Cervical pain [M54.2]  Referring practitioner: MICHEAL Renae  Date of Initial Visit: Type: THERAPY  Noted: 3/5/2025  Today's Date: 3/28/2025  Patient seen for 5 sessions             Subjective   Kinsey Suresh reports: her pain has gotten \"somewhat better.\" When sitting her pain is less and she is getting up and walking more.     Pain: 7/10 pain, R shoulder \"I might have slept wrong on it.\"     Objective     See Exercise, Manual, and Modality Logs for complete treatment.     Assessment/Plan  Kinsey demonstrated good tolerance to all functional strengthening . Continue to progress with current PT plan of care per patient tolerance.     Progress per Plan of Care      Timed:  Manual Therapy:    10     mins  32584;  Therapeutic Exercise:    12     mins  02641;     Neuromuscular Marylou:    0    mins  37257;    Therapeutic Activity:     8     mins  12177;     Self care:                       0     mins  32501;  Gait Trainin     mins  69482;       Ultrasound:                    0     mins  30391;      Untimed:  Mechanical Traction:    0     mins  66790;   Electrical Stimulation:    0     mins  03273 ( );      Timed Treatment:   30   mins   Total Treatment:     30   mins      Electronically signed:   Tiarra Krishnamurthy PTA  Physical Therapist Assistant  [facility] License #: [id number]  "

## (undated) DEVICE — ADAPT UROLOK

## (undated) DEVICE — STONE RETRIEVAL BASKET: Brand: SEGURA HEMISPHERE

## (undated) DEVICE — Device: Brand: DEFENDO AIR/WATER/SUCTION AND BIOPSY VALVE

## (undated) DEVICE — Device

## (undated) DEVICE — SYS IRR PUMP SGL ACTN VAC SYR 10CC

## (undated) DEVICE — COLON KIT: Brand: MEDLINE INDUSTRIES, INC.

## (undated) DEVICE — STNT CLASSC DBL PIG 4.5F 24CM: Type: IMPLANTABLE DEVICE | Site: URETER | Status: NON-FUNCTIONAL

## (undated) DEVICE — SINGLE-USE BIOPSY FORCEPS: Brand: RADIAL JAW 4

## (undated) DEVICE — GW ULTRATRACK HYBRID STR/TP .035IN 3X150CM EA/5

## (undated) DEVICE — SHEATH ACC URETRL UROPASS 12/14F 24CM

## (undated) DEVICE — CATH URETRL PA SPIRAL TP 5F

## (undated) DEVICE — CYSTO PACK: Brand: MEDLINE INDUSTRIES, INC.

## (undated) DEVICE — STNT CLASSIC DBL PIG 4.5F 22CM: Type: IMPLANTABLE DEVICE | Site: URETER | Status: NON-FUNCTIONAL

## (undated) DEVICE — GW PTFE/COAT STR/TP STFF/BDY .038 150CM STRL

## (undated) DEVICE — Y-TYPE TUR/BLADDER IRRIGATION SET, REGULATING CLAMP

## (undated) DEVICE — GLV SURG SENSICARE SLT PF LF 7 STRL

## (undated) DEVICE — CATH URETRL OPEN END W/CONNECT 5F 70CM

## (undated) DEVICE — NITINOL STONE RETRIEVAL BASKET: Brand: ESCAPE

## (undated) DEVICE — TOWEL,OR,DSP,ST,BLUE,STD,4/PK,20PK/CS: Brand: MEDLINE

## (undated) DEVICE — NITINOL WIRE WITH HYDROPHILIC TIP: Brand: SENSOR

## (undated) DEVICE — FIBR LASR HOLMIUM COMPAT 272MH DISP

## (undated) DEVICE — SKIN PREP TRAY W/CHG: Brand: MEDLINE INDUSTRIES, INC.

## (undated) DEVICE — SOL IRRG H2O PL/BG 1000ML STRL

## (undated) DEVICE — GW SUREGLIDE FLXTIP STR/TP .035 3X150CM

## (undated) DEVICE — SOL IRR NACL 0.9PCT 3000ML

## (undated) DEVICE — DUAL LUMEN URETERAL CATHETER

## (undated) DEVICE — BASIC SINGLE BASIN-LF: Brand: MEDLINE INDUSTRIES, INC.

## (undated) DEVICE — CATH URETRL FLXITP POLLACK STD 5F 70CM